# Patient Record
Sex: MALE | Race: WHITE | NOT HISPANIC OR LATINO | Employment: FULL TIME | URBAN - METROPOLITAN AREA
[De-identification: names, ages, dates, MRNs, and addresses within clinical notes are randomized per-mention and may not be internally consistent; named-entity substitution may affect disease eponyms.]

---

## 2017-10-24 ENCOUNTER — ALLSCRIPTS OFFICE VISIT (OUTPATIENT)
Dept: OTHER | Facility: OTHER | Age: 62
End: 2017-10-24

## 2017-10-24 ENCOUNTER — GENERIC CONVERSION - ENCOUNTER (OUTPATIENT)
Dept: OTHER | Facility: OTHER | Age: 62
End: 2017-10-24

## 2017-10-24 DIAGNOSIS — E22.1 HYPERPROLACTINEMIA (HCC): ICD-10-CM

## 2017-10-24 DIAGNOSIS — E29.1 TESTICULAR HYPOFUNCTION: ICD-10-CM

## 2017-10-26 ENCOUNTER — GENERIC CONVERSION - ENCOUNTER (OUTPATIENT)
Dept: OTHER | Facility: OTHER | Age: 62
End: 2017-10-26

## 2017-10-27 LAB
FSH (HISTORICAL): 2.8 MIU/ML (ref 1.5–12.4)
LUTEINIZING HORMONE (HISTORICAL): 1.2 MIU/ML (ref 1.7–8.6)
PROLACTIN (HISTORICAL): 23.7 NG/ML (ref 4–15.2)
SEX HORMONE BINDING GLOBULIN (HISTORICAL): 29 NMOL/L (ref 19.3–76.4)
T4 FREE SERPL-MCNC: 1.03 NG/DL (ref 0.82–1.77)
TESTOSTERONE FREE (HISTORICAL): 5.4 PG/ML (ref 6.6–18.1)
TESTOSTERONE TOTAL (HISTORICAL): 181 NG/DL (ref 264–916)
TSH SERPL DL<=0.05 MIU/L-ACNC: 2.28 UIU/ML (ref 0.45–4.5)

## 2017-10-31 ENCOUNTER — GENERIC CONVERSION - ENCOUNTER (OUTPATIENT)
Dept: OTHER | Facility: OTHER | Age: 62
End: 2017-10-31

## 2017-12-12 DIAGNOSIS — E22.1 HYPERPROLACTINEMIA (HCC): ICD-10-CM

## 2017-12-12 DIAGNOSIS — E22.0 ACROMEGALY AND PITUITARY GIGANTISM (HCC): ICD-10-CM

## 2018-01-16 NOTE — RESULT NOTES
Verified Results  (1) LH (LEUTINIZING HORMONE) 26Oct2017 06:42AM Yissel Herring     Test Name Result Flag Reference   LH 1 2 mIU/mL L 1 7-8 6     (1) [facility] 26Oct2017 06:42AM Yissle Herring     Test Name Result Flag Reference   [address] [place] [address] 2 8 mIU/mL  1 5-12 4     (1) PROLACTIN 26Oct2017 06:42AM Yissel Herring     Test Name Result Flag Reference   Prolactin 23 7 ng/mL H 4 0-15 2     (1) TSH 26Oct2017 06:42AM Yissel Herring     Test Name Result Flag Reference   TSH 2 280 uIU/mL  0 450-4 500     [facility]) Thyroxine (T4) Free, Direct, S 26Oct2017 06:42AM Yissel Herring     Test Name Result Flag Reference   T4,Free(Direct) 1 03 ng/dL  0 82-1 77     (1) TESTOSTERONE 26Oct2017 06:42AM Yissel Herring     Test Name Result Flag Reference   Testosterone, Serum 181 ng/dL L 277-856   Adult male reference interval is based on a population of  healthy nonobese males (BMI <30) between 23and 44years old  [facility], [address] 4114,[id number]  PMID: [id number]  [facility]) Sex Horm Binding Glob, Serum 26Oct2017 06:42AM Yissel Herring     Test Name Result Flag Reference   Sex Horm Binding Glob, Serum 29 0 nmol/L  19 3-76 4     ([facility]) Testosterone, Free, Direct 26Oct2017 06:42AM Yissel Herring     Test Name Result Flag Reference   Free Testosterone(Direct) 5 4 pg/mL L 6 6-18 1       Plan  Hyperprolactinemia, Hypogonadism male    · * MRI BRAIN PITUITARY WO AND W CONTRAST; Status:Need Information - Financial  Authorization; Requested for:31Oct2017;   Hypogonadism male    · (1) [place]; Status:Active; Requested for:31Oct2017;    · (1) CORTISOL AM SPECIMEN; Status:Active; Requested for:31Oct2017;    · (1) IGF-1; Status:Active; Requested for:31Oct2017;    · (1) PROLACTIN; Status:Active; Requested for:31Oct2017;    · ([facility]) Macroprolactin; Status:Active;  Requested for:31Oct2017;

## 2018-01-22 VITALS
BODY MASS INDEX: 24.62 KG/M2 | HEIGHT: 70 IN | DIASTOLIC BLOOD PRESSURE: 78 MMHG | HEART RATE: 64 BPM | WEIGHT: 172 LBS | SYSTOLIC BLOOD PRESSURE: 130 MMHG

## 2018-02-07 LAB
GLUCOSE 2H P 75 G GLC PO SERPL-MCNC: 91 MG/DL (ref 65–139)
GLUCOSE P FAST SERPL-MCNC: 87 MG/DL (ref 65–99)

## 2018-02-08 ENCOUNTER — OFFICE VISIT (OUTPATIENT)
Dept: ENDOCRINOLOGY | Facility: HOSPITAL | Age: 63
End: 2018-02-08
Payer: COMMERCIAL

## 2018-02-08 VITALS
SYSTOLIC BLOOD PRESSURE: 122 MMHG | DIASTOLIC BLOOD PRESSURE: 76 MMHG | WEIGHT: 171.4 LBS | HEIGHT: 70 IN | BODY MASS INDEX: 24.54 KG/M2 | HEART RATE: 60 BPM

## 2018-02-08 DIAGNOSIS — R79.89 ELEVATED INSULIN-LIKE GROWTH FACTOR 1 (IGF-1) LEVEL: ICD-10-CM

## 2018-02-08 DIAGNOSIS — E23.0 HYPOGONADOTROPIC HYPOGONADISM (HCC): Primary | ICD-10-CM

## 2018-02-08 DIAGNOSIS — D35.2 PITUITARY MACROADENOMA (HCC): ICD-10-CM

## 2018-02-08 PROCEDURE — 99214 OFFICE O/P EST MOD 30 MIN: CPT | Performed by: INTERNAL MEDICINE

## 2018-02-08 RX ORDER — B-COMPLEX WITH VITAMIN C
TABLET ORAL
COMMUNITY
End: 2018-05-10

## 2018-02-08 RX ORDER — DEXAMETHASONE 1 MG
TABLET ORAL
Qty: 1 TABLET | Refills: 0 | Status: SHIPPED | OUTPATIENT
Start: 2018-02-08 | End: 2018-02-22

## 2018-02-08 NOTE — PROGRESS NOTES
2/8/2018    Assessment/Plan      Diagnoses and all orders for this visit:    Hypogonadotropic hypogonadism [facility])  -     Ambulatory referral to Neurosurgery; Future  -     Cortisol- Lab Collect; Future  -     Dexamethasone; Future  -     dexamethasone (DECADRON) 1 mg tablet; Take 1 tablet 10 pm the night before cortisol blood test   -     Cortisol- Lab Collect  -     Growth hormone; Future  -     Growth hormone; Future  -     Growth hormone; Future  -     Growth hormone  -     Growth hormone  -     Growth hormone    Pituitary macroadenoma ([address] )  -     Ambulatory referral to Neurosurgery; Future  -     Cortisol- Lab Collect; Future  -     Dexamethasone; Future  -     dexamethasone (DECADRON) 1 mg tablet; Take 1 tablet 10 pm the night before cortisol blood test   -     Cortisol- Lab Collect  -     Growth hormone; Future  -     Growth hormone; Future  -     Growth hormone; Future  -     Growth hormone  -     Growth hormone  -     Growth hormone    Elevated insulin-like growth factor 1 (IGF-1) level  -     Ambulatory referral to Neurosurgery; Future  -     Cortisol- Lab Collect; Future  -     Dexamethasone; Future  -     dexamethasone (DECADRON) 1 mg tablet; Take 1 tablet 10 pm the night before cortisol blood test   -     Cortisol- Lab Collect  -     Growth hormone; Future  -     Growth hormone; Future  -     Growth hormone; Future  -     Growth hormone  -     Growth hormone  -     Growth hormone    Other orders  -     VITAMIN B COMPLEX-C CAPS; Take by mouth  -     cholecalciferol (VITAMIN D3) 1,000 units tablet; Take by mouth        Assessment/Plan: This is a 24-year-old male who initially presented with low testosterone  He is found to have mildly elevated prolactin as well as elevated IGF-1  Pituitary MRI disclosed pituitary tumor approaching the optic chiasm and extending into the cavernous sinus  I believe this is acromegaly causing his hypogonadism from a pituitary tumor source    [facility] was contacted today and apparently they did not do the growth hormone suppression test appropriately  To complete the picture, I have the growth hormone suppression test for the patient  He will also go for a 1 mg overnight dexamethasone suppression test to rule out hyper cortisolism  I have also referred him to neurosurgery to get plugged been  I asked him to bring a CT of MRI images for review at that time  For acromegaly, surgical treatment is generally the 1st approach  Medical treatment is more effective generally after surgical treatment if needed  I would need to recheck his growth hormone levels about 12 weeks after surgery  Would likely check his other pituitary hormones about a month after surgery  We would be available in patient to follow along as well  He did have a colonoscopy about 2 years ago and had 1 small polyp per the patient) I do not have these records at this time)  He does not provide me history of sleep apnea or cardiomyopathy though I do not believe these were tested in the past   Will preliminarily set up a 6 month follow-up appointment for now  CC:   Hypogonadism follow-up    History of Present Illness     HPI: Tam Painter is a 60y o  year old male presents for follow-up of hypogonadism  Elevated IGF-1 and prolactin levels were found since his last visit  He has no new complaints this visit  MRI of the pituitary did disclose a pituitary tumor  Discussing with the patient more, he states in November he had an extensive eye exam from his eye doctor that disclosed decreased left-sided peripheral vision as well as glaucoma which he is being treated for now  He denies any worsening of vision or headaches at this time  He still notes some decreased energy level  Review of Systems   Constitutional: Positive for fatigue  Negative for chills and fever  HENT: Negative for trouble swallowing and voice change  Eyes: Negative for visual disturbance     Respiratory: Negative for shortness of breath  Cardiovascular: Negative for chest pain, palpitations and leg swelling  Gastrointestinal: Negative for abdominal pain, nausea and vomiting  Endocrine: Negative for polydipsia and polyuria  Musculoskeletal: Negative for arthralgias and myalgias  Skin: Negative for rash  Neurological: Negative for dizziness, tremors and weakness  Hematological: Negative for adenopathy  Psychiatric/Behavioral: Negative for agitation and confusion  Historical Information   History reviewed  No pertinent past medical history  History reviewed  No pertinent surgical history  Social History   History   Alcohol use Not on file     History   Drug use: Unknown     History   Smoking Status    Former Smoker    Types: Cigarettes    Quit date: 1997   Smokeless Tobacco    Never Used     Family History:   Family History   Problem Relation Age of Onset    No Known Problems Mother     No Known Problems Father        Meds/Allergies   Current Outpatient Prescriptions   Medication Sig Dispense Refill    cholecalciferol (VITAMIN D3) 1,000 units tablet Take by mouth      VITAMIN B COMPLEX-C CAPS Take by mouth      dexamethasone (DECADRON) 1 mg tablet Take 1 tablet 10 pm the night before cortisol blood test  1 tablet 0     No current facility-administered medications for this visit  No Known Allergies    Objective   Vitals: Blood pressure 122/76, pulse 60, height 5' 9 5" (1 765 m), weight 77 7 kg (171 lb 6 4 oz)  Invasive Devices          No matching active lines, drains, or airways          Physical Exam   Constitutional: He is oriented to person, place, and time  He appears well-developed and well-nourished  No distress  HENT:   Head: Normocephalic and atraumatic  Mouth/Throat: No oropharyngeal exudate  Eyes: Conjunctivae and EOM are normal  Pupils are equal, round, and reactive to light  No scleral icterus  Neck: Normal range of motion  Neck supple  No thyromegaly present     Cardiovascular: Normal rate and regular rhythm  No murmur heard  Pulmonary/Chest: Effort normal and breath sounds normal  He has no wheezes  Abdominal: Soft  Bowel sounds are normal  He exhibits no distension  There is no tenderness  Musculoskeletal: Normal range of motion  He exhibits no edema  Neurological: He is alert and oriented to person, place, and time  He exhibits normal muscle tone  Skin: Skin is warm and dry  No rash noted  He is not diaphoretic  Psychiatric: He has a normal mood and affect  His behavior is normal        The history was obtained from the review of the chart and from the patient  Lab Results:      11/28/2017:  Glucose 104, creatinine 0 71, calcium 9 5, prolactin 26, IGF-1 658 (49584) a m  cortisol 15 3     12/21/2017:  Large enhancing sellar mass expanding the pituitary fossa extending superiorly to abut the optic chiasm  Bilateral cavernous sinus extension most prominent on the left side    This was MRI exam at [facility]     Lab Results   Component Value Date    FREET4 1 03 10/26/2017       Recent Results (from the past 83099 hour(s))   TESTOSTERONE, FREE, DIRECT (HISTORICAL)    Collection Time: 10/26/17  6:42 AM   Result Value Ref Range    TESTOSTERONE FREE 5 4 (L) 6 6 - 18 1 pg/mL   SEX HORMONE BINDING GLOBULIN,SERUM (HISTORICAL)    Collection Time: 10/26/17  6:42 AM   Result Value Ref Range    SEX HORMONE BINDING GLOBULIN 29 0 19 3 - 76 4 nmol/L   T4, FREE (HISTORICAL)    Collection Time: 10/26/17  6:42 AM   Result Value Ref Range    Free T4 1 03 0 82 - 1 77 ng/dL   TESTOSTERONE (HISTORICAL)    Collection Time: 10/26/17  6:42 AM   Result Value Ref Range    TESTOSTERONE TOTAL 181 (L) 264 - 916 ng/dL         Future Appointments  Date Time Provider Trell Garcia   8/8/2018 4:15 PM José Fuller, [address]

## 2018-02-08 NOTE — LETTER
February 8, 2018     Kirk Erickson  [address] [facility]    Patient: Carrie Mckinney   YOB: 1955   Date of Visit: 2/8/2018       Dear Dr Lopez Hurt: Thank you for referring Lian Kan to me for evaluation  Below are my notes for this consultation  If you have questions, please do not hesitate to call me  I look forward to following your patient along with you  Sincerely,        Manuela Meyer DO        CC: No Recipients  Manuela Meyer DO  2/8/2018  8:49 AM  Sign at close encounter  2/8/2018    Assessment/Plan      Diagnoses and all orders for this visit:    Hypogonadotropic hypogonadism [facility])  -     Ambulatory referral to Neurosurgery; Future  -     Cortisol- Lab Collect; Future  -     Dexamethasone; Future  -     dexamethasone (DECADRON) 1 mg tablet; Take 1 tablet 10 pm the night before cortisol blood test   -     Cortisol- Lab Collect  -     Growth hormone; Future  -     Growth hormone; Future  -     Growth hormone; Future  -     Growth hormone  -     Growth hormone  -     Growth hormone    Pituitary macroadenoma ([address] )  -     Ambulatory referral to Neurosurgery; Future  -     Cortisol- Lab Collect; Future  -     Dexamethasone; Future  -     dexamethasone (DECADRON) 1 mg tablet; Take 1 tablet 10 pm the night before cortisol blood test   -     Cortisol- Lab Collect  -     Growth hormone; Future  -     Growth hormone; Future  -     Growth hormone; Future  -     Growth hormone  -     Growth hormone  -     Growth hormone    Elevated insulin-like growth factor 1 (IGF-1) level  -     Ambulatory referral to Neurosurgery; Future  -     Cortisol- Lab Collect; Future  -     Dexamethasone; Future  -     dexamethasone (DECADRON) 1 mg tablet; Take 1 tablet 10 pm the night before cortisol blood test   -     Cortisol- Lab Collect  -     Growth hormone; Future  -     Growth hormone; Future  -     Growth hormone;  Future  -     Growth hormone  -     Growth hormone  - Growth hormone    Other orders  -     VITAMIN B COMPLEX-C CAPS; Take by mouth  -     cholecalciferol (VITAMIN D3) 1,000 units tablet; Take by mouth        Assessment/Plan: This is a 59-year-old male who initially presented with low testosterone  He is found to have mildly elevated prolactin as well as elevated IGF-1  Pituitary MRI disclosed pituitary tumor approaching the optic chiasm and extending into the cavernous sinus  I believe this is acromegaly causing his hypogonadism from a pituitary tumor source  [facility] was contacted today and apparently they did not do the growth hormone suppression test appropriately  To complete the picture, I have the growth hormone suppression test for the patient  He will also go for a 1 mg overnight dexamethasone suppression test to rule out hyper cortisolism  I have also referred him to neurosurgery to get plugged been  I asked him to bring a CT of MRI images for review at that time  For acromegaly, surgical treatment is generally the 1st approach  Medical treatment is more effective generally after surgical treatment if needed  I would need to recheck his growth hormone levels about 12 weeks after surgery  Would likely check his other pituitary hormones about a month after surgery  We would be available in patient to follow along as well  He did have a colonoscopy about 2 years ago and had 1 small polyp per the patient) I do not have these records at this time)  He does not provide me history of sleep apnea or cardiomyopathy though I do not believe these were tested in the past   Will preliminarily set up a 6 month follow-up appointment for now  CC:   Hypogonadism follow-up    History of Present Illness     HPI: Laure Rosales is a 60y o  year old male presents for follow-up of hypogonadism  Elevated IGF-1 and prolactin levels were found since his last visit  He has no new complaints this visit  MRI of the pituitary did disclose a pituitary tumor  Discussing with the patient more, he states in November he had an extensive eye exam from his eye doctor that disclosed decreased left-sided peripheral vision as well as glaucoma which he is being treated for now  He denies any worsening of vision or headaches at this time  He still notes some decreased energy level  Review of Systems   Constitutional: Positive for fatigue  Negative for chills and fever  HENT: Negative for trouble swallowing and voice change  Eyes: Negative for visual disturbance  Respiratory: Negative for shortness of breath  Cardiovascular: Negative for chest pain, palpitations and leg swelling  Gastrointestinal: Negative for abdominal pain, nausea and vomiting  Endocrine: Negative for polydipsia and polyuria  Musculoskeletal: Negative for arthralgias and myalgias  Skin: Negative for rash  Neurological: Negative for dizziness, tremors and weakness  Hematological: Negative for adenopathy  Psychiatric/Behavioral: Negative for agitation and confusion  Historical Information   History reviewed  No pertinent past medical history  History reviewed  No pertinent surgical history  Social History   History   Alcohol use Not on file     History   Drug use: Unknown     History   Smoking Status    Former Smoker    Types: Cigarettes    Quit date: 1997   Smokeless Tobacco    Never Used     Family History:   Family History   Problem Relation Age of Onset    No Known Problems Mother     No Known Problems Father        Meds/Allergies   Current Outpatient Prescriptions   Medication Sig Dispense Refill    cholecalciferol (VITAMIN D3) 1,000 units tablet Take by mouth      VITAMIN B COMPLEX-C CAPS Take by mouth      dexamethasone (DECADRON) 1 mg tablet Take 1 tablet 10 pm the night before cortisol blood test  1 tablet 0     No current facility-administered medications for this visit        No Known Allergies    Objective   Vitals: Blood pressure 122/76, pulse 60, height 5' 9 5" (1 765 m), weight 77 7 kg (171 lb 6 4 oz)  Invasive Devices          No matching active lines, drains, or airways          Physical Exam   Constitutional: He is oriented to person, place, and time  He appears well-developed and well-nourished  No distress  HENT:   Head: Normocephalic and atraumatic  Mouth/Throat: No oropharyngeal exudate  Eyes: Conjunctivae and EOM are normal  Pupils are equal, round, and reactive to light  No scleral icterus  Neck: Normal range of motion  Neck supple  No thyromegaly present  Cardiovascular: Normal rate and regular rhythm  No murmur heard  Pulmonary/Chest: Effort normal and breath sounds normal  He has no wheezes  Abdominal: Soft  Bowel sounds are normal  He exhibits no distension  There is no tenderness  Musculoskeletal: Normal range of motion  He exhibits no edema  Neurological: He is alert and oriented to person, place, and time  He exhibits normal muscle tone  Skin: Skin is warm and dry  No rash noted  He is not diaphoretic  Psychiatric: He has a normal mood and affect  His behavior is normal        The history was obtained from the review of the chart and from the patient  Lab Results:      11/28/2017:  Glucose 104, creatinine 0 71, calcium 9 5, prolactin 26, IGF-1 658 (02596) a m  cortisol 15 3     12/21/2017:  Large enhancing sellar mass expanding the pituitary fossa extending superiorly to abut the optic chiasm  Bilateral cavernous sinus extension most prominent on the left side    This was MRI exam at [facility]     Lab Results   Component Value Date    FREET4 1 03 10/26/2017       Recent Results (from the past 72151 hour(s))   TESTOSTERONE, FREE, DIRECT (HISTORICAL)    Collection Time: 10/26/17  6:42 AM   Result Value Ref Range    TESTOSTERONE FREE 5 4 (L) 6 6 - 18 1 pg/mL   SEX HORMONE BINDING GLOBULIN,SERUM (HISTORICAL)    Collection Time: 10/26/17  6:42 AM   Result Value Ref Range    SEX HORMONE BINDING GLOBULIN 29 0 19 3 - 76 4 nmol/L   T4, FREE (HISTORICAL)    Collection Time: 10/26/17  6:42 AM   Result Value Ref Range    Free T4 1 03 0 82 - 1 77 ng/dL   TESTOSTERONE (HISTORICAL)    Collection Time: 10/26/17  6:42 AM   Result Value Ref Range    TESTOSTERONE TOTAL 181 (L) 264 - 916 ng/dL         Future Appointments  Date Time Provider Trell Garcia   8/8/2018 4:15 PM [facility]

## 2018-02-08 NOTE — LETTER
February 8, 2018     [place]  [address] [facility]    Patient: Avel Angelucci   YOB: 1955   Date of Visit: 2/8/2018       Dear Dr Danny Mcneal: Thank you for referring Salima Yoni to me for evaluation  Below are my notes for this consultation  If you have questions, please do not hesitate to call me  I look forward to following your patient along with you  Sincerely,        Iva Guzman DO        CC: No Recipients  Iva Guzman DO  2/8/2018  7:44 AM  Sign at close encounter  2/8/2018    Assessment/Plan      Diagnoses and all orders for this visit:    Hypogonadotropic hypogonadism ([address] )    Pituitary macroadenoma ([address] )    Elevated insulin-like growth factor 1 (IGF-1) level    Other orders  -     VITAMIN B COMPLEX-C CAPS; Take by mouth  -     cholecalciferol (VITAMIN D3) 1,000 units tablet; Take by mouth        Assessment/Plan:  ***      CC: ***    History of Present Illness     HPI: Avel Angelucci is a 60y o  year old male with history of ***    Review of Systems   Constitutional: Negative for chills, fatigue and fever  HENT: Negative for trouble swallowing and voice change  Eyes: Negative for visual disturbance  Respiratory: Negative for shortness of breath  Cardiovascular: Negative for chest pain, palpitations and leg swelling  Gastrointestinal: Negative for abdominal pain, nausea and vomiting  Endocrine: Negative for polydipsia and polyuria  Musculoskeletal: Negative for arthralgias and myalgias  Skin: Negative for rash  Neurological: Negative for dizziness, tremors and weakness  Hematological: Negative for adenopathy  Psychiatric/Behavioral: Negative for agitation and confusion  Historical Information   History reviewed  No pertinent past medical history  History reviewed  No pertinent surgical history    Social History   History   Alcohol use Not on file     History   Drug use: Unknown     History   Smoking Status    Former Smoker    Types: Cigarettes    Quit date: 1997   Smokeless Tobacco    Never Used     Family History:   Family History   Problem Relation Age of Onset    No Known Problems Mother     No Known Problems Father        Meds/Allergies   Current Outpatient Prescriptions   Medication Sig Dispense Refill    cholecalciferol (VITAMIN D3) 1,000 units tablet Take by mouth      VITAMIN B COMPLEX-C CAPS Take by mouth       No current facility-administered medications for this visit  No Known Allergies    Objective   Vitals: Blood pressure 122/76, pulse 60, height 5' 9 5" (1 765 m), weight 77 7 kg (171 lb 6 4 oz)  Invasive Devices          No matching active lines, drains, or airways          Physical Exam   Constitutional: He is oriented to person, place, and time  He appears well-developed and well-nourished  No distress  HENT:   Head: Normocephalic and atraumatic  Mouth/Throat: No oropharyngeal exudate  Eyes: Conjunctivae and EOM are normal  Pupils are equal, round, and reactive to light  No scleral icterus  Neck: Normal range of motion  Neck supple  No thyromegaly present  Cardiovascular: Normal rate and regular rhythm  No murmur heard  Pulmonary/Chest: Effort normal and breath sounds normal  He has no wheezes  Abdominal: Soft  Bowel sounds are normal  He exhibits no distension  There is no tenderness  Musculoskeletal: Normal range of motion  He exhibits no edema  Neurological: He is alert and oriented to person, place, and time  He exhibits normal muscle tone  Skin: Skin is warm and dry  No rash noted  He is not diaphoretic  Psychiatric: He has a normal mood and affect  His behavior is normal        The history was obtained from the review of the chart and from the patient      Lab Results:      ***    Lab Results   Component Value Date    FREET4 1 03 10/26/2017       Recent Results (from the past 02996 hour(s))   TESTOSTERONE, FREE, DIRECT (HISTORICAL)    Collection Time: 10/26/17  6:42 AM   Result Value Ref Range    TESTOSTERONE FREE 5 4 (L) 6 6 - 18 1 pg/mL   SEX HORMONE BINDING GLOBULIN,SERUM (HISTORICAL)    Collection Time: 10/26/17  6:42 AM   Result Value Ref Range    SEX HORMONE BINDING GLOBULIN 29 0 19 3 - 76 4 nmol/L   T4, FREE (HISTORICAL)    Collection Time: 10/26/17  6:42 AM   Result Value Ref Range    Free T4 1 03 0 82 - 1 77 ng/dL   TESTOSTERONE (HISTORICAL)    Collection Time: 10/26/17  6:42 AM   Result Value Ref Range    TESTOSTERONE TOTAL 181 (L) 264 - 916 ng/dL         No future appointments

## 2018-02-22 ENCOUNTER — TELEPHONE (OUTPATIENT)
Dept: NEUROSURGERY | Facility: CLINIC | Age: 63
End: 2018-02-22

## 2018-02-22 ENCOUNTER — OFFICE VISIT (OUTPATIENT)
Dept: NEUROSURGERY | Facility: CLINIC | Age: 63
End: 2018-02-22
Payer: COMMERCIAL

## 2018-02-22 VITALS
SYSTOLIC BLOOD PRESSURE: 112 MMHG | HEIGHT: 70 IN | DIASTOLIC BLOOD PRESSURE: 60 MMHG | HEART RATE: 51 BPM | TEMPERATURE: 97.2 F | BODY MASS INDEX: 24.34 KG/M2 | RESPIRATION RATE: 16 BRPM | WEIGHT: 170 LBS

## 2018-02-22 DIAGNOSIS — D35.2 PITUITARY MACROADENOMA (HCC): ICD-10-CM

## 2018-02-22 DIAGNOSIS — E23.0 HYPOGONADOTROPIC HYPOGONADISM (HCC): ICD-10-CM

## 2018-02-22 DIAGNOSIS — R79.89 ELEVATED INSULIN-LIKE GROWTH FACTOR 1 (IGF-1) LEVEL: ICD-10-CM

## 2018-02-22 PROCEDURE — 99244 OFF/OP CNSLTJ NEW/EST MOD 40: CPT | Performed by: NEUROLOGICAL SURGERY

## 2018-02-22 NOTE — PROGRESS NOTES
Patient ID: Matthew Hernandez is a 60 y o  male    Assessment/Plan:    Diagnoses and all orders for this visit:    Hypogonadotropic hypogonadism ([facility] [address] )  -     Ambulatory referral to Neurosurgery    Pituitary macroadenoma [facility])  -     Ambulatory referral to Neurosurgery    Elevated insulin-like growth factor 1 (IGF-1) level  -     Ambulatory referral to Neurosurgery          Discussion Summary: This is a 21-year-old male who is a  and is noted inability to see things in his peripheral vision  Imaging studies disclosed a large pituitary macroadenoma  This is an IGF 1 secreting tumor  His dexamethasone suppression test a remains to be performed  Imaging studies demonstrate compression of the overlying optic chiasm  I recommended that he undergo the following surgical intervention:  Image guided endoscopic transsphenoidal approach for debulking of pituitary macroadenoma an abdominal fat graft harvesting with possible placement of lumbar drain    The risks, benefits and complications of surgery were explained in detail to Matthew Hernandez  including hemorrhage, infection, CSF leakage, wound problems, pain, weakness, numbness, dysesthesiae, paralysis, coma, and death  Also, the possibility  of further surgery being required was emphasized  Other potential medical complications were outlined, including deep venous thrombosis, pulmonary embolism, pneumonia, urinary tract infection, myocardial infarction,  and stroke  The need for physical therapy, occupational therapy, and rehabilitation was also mentioned  The alternatives to surgery were discussed  Isidropatience Hernandez asked relevant questions and asked that we proceed with arrangements for surgery       Reason for Visit: Consult and Brain Tumor      Chief Complaint: Patient presents for initial evaluation regarding pituitary macroadenoma found on MRI brain    HPI: Patient is a 60year old male who was referred to our office by Dr Misbah Reilly for evaluation of pituitary macroadenoma  Patient states that everything started with him feeling tired and decreased stamina  He went to his primary care physician and he referred him to endocrinology  Endocrinology ordered labs to check on testosterone level which was low and MRI Brain which found the brain mass  Patient referred for neurosurgical evaluation  His prolactin level increases likely related to compression and stalk effect  His testosterone level is decreased causing some muscle wasting  This patient 1st noted a problem when his  could see other aircraft which he could not see  He sought the attention of a family doctor and then an ophthalmologist to identified visual field deficits  An MRI was performed and he was referred to an endocrinologist   This endocrinologist identified ache IGF 1 increase and testosterone level decreased  His prolactin level was mildly increased  He said that he felt increasingly tired and has noted some wasting of his arms  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   He has noted very little change in his ability to perform this other then becoming more tired more easily and feeling of being weaker  Review of Systems   Constitutional:        Feeling tired but since being on the Vitamin B-12 he feels that this has improved   HENT: Negative  Eyes: Negative  Respiratory: Negative  Cardiovascular: Negative  Gastrointestinal: Negative  Endocrine: Negative  Genitourinary: Negative  Musculoskeletal: Negative  Skin: Negative  Allergic/Immunologic: Negative  Neurological: Negative  Hematological: Negative  Psychiatric/Behavioral: Negative              The following portions of the patient's history were reviewed and updated as appropriate: allergies, current medications, past family history, past medical history, past social history, past surgical history and problem list     Active Ambulatory Problems     Diagnosis Date Noted    No Active Ambulatory Problems     Resolved Ambulatory Problems     Diagnosis Date Noted    No Resolved Ambulatory Problems     Past Medical History:   Diagnosis Date    Decreased stamina     Feeling tired     Lipoma     Lipoma of left shoulder        Past Surgical History:   Procedure Laterality Date    ANKLE SURGERY      lipoma    FINGER TENDON REPAIR      SHOULDER SURGERY      lipoma removal     History   Smoking Status    Former Smoker    Types: Cigarettes    Quit date: 1997   Smokeless Tobacco    Never Used     History   Alcohol Use    Yes     Comment: occasional     History   Drug Use No       Vitals:    02/22/18 1307   BP: 112/60   Pulse: (!) 51   Resp: 16   Temp: (!) 97 2 °F (36 2 °C)         Current Outpatient Prescriptions:     cholecalciferol (VITAMIN D3) 1,000 units tablet, Take by mouth daily  , Disp: , Rfl:     VITAMIN B COMPLEX-C CAPS, Take by mouth, Disp: , Rfl:   He also states that he has eye drops but does not remember the name  Physical Exam  Awake alert oriented  Extraocular movements are intact  Pupils are equal round reactive to light and accommodate  This facial sensation is intact bilaterally  Facial expressions intact and grimace and at rest  His jaw is quite large  His hands are large  He says that he has experienced some muscle atrophy and is biceps muscles are small in comparison to his forearms  He has no difficulty with station and gait  His sensation in the upper and lower extremities is intact to double simultaneous stimulation  Finger-to-nose testing intact  Rapid alternating movements are intact    Results/Data:  An MRI of the brain demonstrates a pituitary macroadenoma with compression of the overlying optic chiasm  This is homogeneous leak enhancing  There is expansion of the normal size of the sella indicative of a tumor that has been there for quite some time    CT images below

## 2018-02-22 NOTE — LETTER
February 22, 2018      Garrick Cardona, [address]  [address] [address]    Patient: Venessa Larkin   YOB: 1955   Date of Visit: 2/22/2018       Dear Dr Vika Damico: Thank you for referring Jihan Chela to me for evaluation  Below are my notes for this consultation  If you have questions, please do not hesitate to call me  I look forward to following your patient along with you           Sincerely,        Lamont Blair MD        CC: Melina Palma

## 2018-02-22 NOTE — PATIENT INSTRUCTIONS
Please make sure you are laboratory testing firm forwards your endocrine lab results to our office    Please ask your ophthalmologist to Re for his information and future evaluations to our office    Consider strongly scheduling a surgery for removing portion of the pituitary tumor

## 2018-02-22 NOTE — PROGRESS NOTES
Patient ID: Eduard Vasquez is a 60 y o  male    Assessment/Plan:    Diagnoses and all orders for this visit:    Hypogonadotropic hypogonadism [facility])  -     Ambulatory referral to Neurosurgery  -     Ambulatory referral to Ophthalmology; Future  -     Case request operating room: Image guided endoscopic transsphenoidal approach for debulking of pituitary macroadenoma an abdominal fat graft harvesting with possible placement of lumbar drain, Image guided endoscopic transsphenoidal approach for d   ; Standing  -     Case request operating room: Image guided endoscopic transsphenoidal approach for debulking of pituitary macroadenoma an abdominal fat graft harvesting with possible placement of lumbar drain, Image guided endoscopic transsphenoidal approach for d    Pituitary macroadenoma ([facility] [address] )  -     Ambulatory referral to Neurosurgery  -     Ambulatory referral to Ophthalmology; Future  -     Case request operating room: Image guided endoscopic transsphenoidal approach for debulking of pituitary macroadenoma an abdominal fat graft harvesting with possible placement of lumbar drain, Image guided endoscopic transsphenoidal approach for d   ; Standing  -     Case request operating room: Image guided endoscopic transsphenoidal approach for debulking of pituitary macroadenoma an abdominal fat graft harvesting with possible placement of lumbar drain, Image guided endoscopic transsphenoidal approach for d    Elevated insulin-like growth factor 1 (IGF-1) level  -     Ambulatory referral to Neurosurgery  -     Ambulatory referral to Ophthalmology; Future  -     Case request operating room: Image guided endoscopic transsphenoidal approach for debulking of pituitary macroadenoma an abdominal fat graft harvesting with possible placement of lumbar drain, Image guided endoscopic transsphenoidal approach for d   ; Standing  -     Case request operating room: Image guided endoscopic transsphenoidal approach for debulking of pituitary macroadenoma an abdominal fat graft harvesting with possible placement of lumbar drain, Image guided endoscopic transsphenoidal approach for d  Other orders  -     Diet NPO; Sips with meds; Standing  -     Height and weight upon arrival; Standing  -     Void on call to OR; Standing  -     Insert peripheral IV; Standing          Discussion Summary: This is a 76-year-old male who is a  and is noted inability to see things in his peripheral vision  Imaging studies disclosed a large pituitary macroadenoma  This is an IGF 1 secreting tumor  His dexamethasone suppression test a remains to be performed  Imaging studies demonstrate compression of the overlying optic chiasm  I recommended that he undergo the following surgical intervention:  Image guided endoscopic transsphenoidal approach for debulking of pituitary macroadenoma an abdominal fat graft harvesting with possible placement of lumbar drain    The risks, benefits and complications of surgery were explained in detail to [employer]  including hemorrhage, infection, CSF leakage, wound problems, pain, weakness, numbness, dysesthesiae, paralysis, coma, and death  Also, the possibility  of further surgery being required was emphasized  Other potential medical complications were outlined, including deep venous thrombosis, pulmonary embolism, pneumonia, urinary tract infection, myocardial infarction,  and stroke  The need for physical therapy, occupational therapy, and rehabilitation was also mentioned  The alternatives to surgery were discussed  [employer] asked relevant questions and asked that we proceed with arrangements for surgery  Reason for Visit: Consult and Brain Tumor      Chief Complaint: Patient presents for initial evaluation regarding pituitary macroadenoma found on MRI brain    HPI: Patient is a 60year old male who was referred to our office by Dr North Magaña for evaluation of pituitary macroadenoma   Patient states that everything started with him feeling tired and decreased stamina  He went to his primary care physician and he referred him to endocrinology  Endocrinology ordered labs to check on testosterone level which was low and MRI Brain which found the brain mass  Patient referred for neurosurgical evaluation  His prolactin level increases likely related to compression and stalk effect  His testosterone level is decreased causing some muscle wasting  This patient 1st noted a problem when his  could see other aircraft which he could not see  He sought the attention of a family doctor and then an ophthalmologist to identified visual field deficits  An MRI was performed and he was referred to an endocrinologist   This endocrinologist identified ache IGF 1 increase and testosterone level decreased  His prolactin level was mildly increased  He said that he felt increasingly tired and has noted some wasting of his arms  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   He has noted very little change in his ability to perform this other then becoming more tired more easily and feeling of being weaker  Review of Systems   Constitutional:        Feeling tired but since being on the Vitamin B-12 he feels that this has improved   HENT: Negative  Eyes: Negative  Respiratory: Negative  Cardiovascular: Negative  Gastrointestinal: Negative  Endocrine: Negative  Genitourinary: Negative  Musculoskeletal: Negative  Skin: Negative  Allergic/Immunologic: Negative  Neurological: Negative  Hematological: Negative  Psychiatric/Behavioral: Negative              The following portions of the patient's history were reviewed and updated as appropriate: allergies, current medications, past family history, past medical history, past social history, past surgical history and problem list     Active Ambulatory Problems     Diagnosis Date Noted    No Active Ambulatory Problems Resolved Ambulatory Problems     Diagnosis Date Noted    No Resolved Ambulatory Problems     Past Medical History:   Diagnosis Date    Decreased stamina     Feeling tired     Lipoma     Lipoma of left shoulder        Past Surgical History:   Procedure Laterality Date    ANKLE SURGERY      lipoma    FINGER TENDON REPAIR      SHOULDER SURGERY      lipoma removal     History   Smoking Status    Former Smoker    Types: Cigarettes    Quit date: 1997   Smokeless Tobacco    Never Used     History   Alcohol Use    Yes     Comment: occasional     History   Drug Use No       Vitals:    02/22/18 1307   BP: 112/60   Pulse: (!) 51   Resp: 16   Temp: (!) 97 2 °F (36 2 °C)         Current Outpatient Prescriptions:     cholecalciferol (VITAMIN D3) 1,000 units tablet, Take by mouth daily  , Disp: , Rfl:     VITAMIN B COMPLEX-C CAPS, Take by mouth, Disp: , Rfl:   He also states that he has eye drops but does not remember the name  Physical Exam  Awake alert oriented  Extraocular movements are intact  Pupils are equal round reactive to light and accommodate  This facial sensation is intact bilaterally  Facial expressions intact and grimace and at rest  His jaw is quite large  His hands are large  He says that he has experienced some muscle atrophy and is biceps muscles are small in comparison to his forearms  He has no difficulty with station and gait  His sensation in the upper and lower extremities is intact to double simultaneous stimulation  Finger-to-nose testing intact  Rapid alternating movements are intact    Results/Data:  An MRI of the brain demonstrates a pituitary macroadenoma with compression of the overlying optic chiasm  This is homogeneous leak enhancing  There is expansion of the normal size of the sella indicative of a tumor that has been there for quite some time    CT images below

## 2018-02-22 NOTE — TELEPHONE ENCOUNTER
Dr Beba Liang saw the patient today and recommended that he undergo the following surgical intervention:  Image guided endoscopic transsphenoidal approach for debulking of pituitary macroadenoma an abdominal fat graft harvesting with possible placement of lumbar drain  We are pending an Ophthalmology visit he has scheduled for tomorrow and Endocrine test including "Dexamethasone Suppression Test" to get him scheduled for the above procedure       PENDING

## 2018-02-28 LAB
CORTIS SERPL-MCNC: 8.7 UG/DL
DEXAMETHASONE SERPL-MCNC: 108 NG/DL

## 2018-03-01 DIAGNOSIS — D49.7 PITUITARY TUMOR: Primary | ICD-10-CM

## 2018-03-05 NOTE — TELEPHONE ENCOUNTER
Patient was seen by [facility] on 2/26/18  They will be faxing testing completed at the office visit and I requested the OCT to be mailed which they are going to check if it is possible  I will be getting a call back regarding that but records will be faxed to our back fax at [phone]

## 2018-03-06 NOTE — TELEPHONE ENCOUNTER
Patient had the requested testing completed  Dexamethasone test and Ophthalmology Eval please review and advise regarding scheduling for surgery

## 2018-03-09 ENCOUNTER — DOCUMENTATION (OUTPATIENT)
Dept: NEUROSURGERY | Facility: CLINIC | Age: 63
End: 2018-03-09

## 2018-03-13 NOTE — TELEPHONE ENCOUNTER
Results came back and were reviewed by Dr Emiliano Krueger  He said we are good to go with planned surgical intervention per last note  Margo Jaime is aware and will contact ENT to arrange   Thanks

## 2018-03-14 PROBLEM — D35.2 PITUITARY MACROADENOMA (HCC): Status: ACTIVE | Noted: 2018-03-14

## 2018-03-14 PROBLEM — E23.0 HYPOGONADOTROPIC HYPOGONADISM (HCC): Status: ACTIVE | Noted: 2018-03-14

## 2018-03-14 PROBLEM — R79.89 ELEVATED INSULIN-LIKE GROWTH FACTOR 1 (IGF-1) LEVEL: Status: ACTIVE | Noted: 2018-03-14

## 2018-03-17 LAB
CORTIS F 24H UR-MRATE: 74 UG/24 HR (ref 0–50)
CORTIS F UR-MCNC: 48 UG/L
CREAT 24H UR-MRATE: 1415 MG/24 HR (ref 1000–2000)
CREAT UR-MCNC: 91.3 MG/DL

## 2018-03-23 ENCOUNTER — DOCUMENTATION (OUTPATIENT)
Dept: NEUROSURGERY | Facility: CLINIC | Age: 63
End: 2018-03-23

## 2018-03-23 DIAGNOSIS — D35.2 PITUITARY MACROADENOMA (HCC): Primary | ICD-10-CM

## 2018-04-11 RX ORDER — LATANOPROST 50 UG/ML
1 SOLUTION/ DROPS OPHTHALMIC
COMMUNITY

## 2018-04-11 NOTE — PRE-PROCEDURE INSTRUCTIONS
Pre-Surgery Instructions:   Medication Instructions    latanoprost (XALATAN) 0 005 % ophthalmic solution Instructed patient per Anesthesia Guidelines  Pre op instructions reviewed with patient on 4/11     [facility] bathing and hospital instructions reviewed at this time with understanding

## 2018-04-18 ENCOUNTER — OFFICE VISIT (OUTPATIENT)
Dept: NEUROSURGERY | Facility: CLINIC | Age: 63
End: 2018-04-18

## 2018-04-18 VITALS
WEIGHT: 167.6 LBS | HEIGHT: 70 IN | SYSTOLIC BLOOD PRESSURE: 127 MMHG | HEART RATE: 62 BPM | RESPIRATION RATE: 16 BRPM | TEMPERATURE: 97.4 F | DIASTOLIC BLOOD PRESSURE: 65 MMHG | BODY MASS INDEX: 23.99 KG/M2

## 2018-04-18 DIAGNOSIS — D35.2 PITUITARY MACROADENOMA (HCC): Primary | ICD-10-CM

## 2018-04-18 DIAGNOSIS — Z01.818 PREOP EXAMINATION: Primary | ICD-10-CM

## 2018-04-18 DIAGNOSIS — D35.2 PITUITARY MACROADENOMA (HCC): ICD-10-CM

## 2018-04-18 PROCEDURE — PREOP: Performed by: PHYSICIAN ASSISTANT

## 2018-04-18 NOTE — PATIENT INSTRUCTIONS
Patient advised to make appointment with his PCP for clearance as previously advised  Patient to have updated MRI of the brain pituitary wo and w contrast (with Bravo) for upcoming surgery on 4/25/2018

## 2018-04-18 NOTE — PROGRESS NOTES
Assessment/Plan:    Very pleasant 60-year-old male, presents for preoperative evaluation, he has planned "image guided endoscopic transsphenoidal approach for debulk in a pituitary macroadenoma abdominal fat graft harvesting with possible placement of lumbar drain", scheduled for 4/25/18  The procedure is scheduled with Dr Tyrone Guerrero, and the patient reports Dr Tyrone Guerrero has explained in a very detailed fashion the proposed surgical procedure, risks and possible complications, as well as the benefits of the procedure  He expresses understanding of this information/explanation, and is in agreement with the proposed procedure and wishes to proceed  He is a life time nonsmoker  He has a prior history of surgery performed under general anesthesia which was tolerated without complication    He denies SOB or CP with activity  He denies bleeding disorder or history of same  He denies chronic pulmonary conditions  He is under the care of a primary care provider and is followed regularly for wellness  He denies medication allergies  He understands to discontinue aspirin, or NSAIDS, 7 days prior to his surgery  Postoperative activities were briefly reviewed, he understands he is to lift no greater than 10 pounds until follow-up in approximately 6 weeks, he also understands to avoid immersion in water for approximately 4 weeks, and additionally understands he may ambulate as tolerated and is encouraged to do so  Pre operative skin preparation was discussed with the patient and he understand to wash/ shower with Hibiclens (chlorhexidine) and utilize Jakub cloths as directed  These findings, impressions and recommendations are reviewed in great detail with the patient, he expressed understanding and agreement, his questions were answered completely and to his satisfaction           Diagnoses and all orders for this visit:    Preop examination  -     MRI brain pituitary wo and w contrast; Future    Pituitary macroadenoma ([facility] [address] )  -     MRI brain pituitary wo and w contrast; Future          Subjective:      Patient ID: Christie Mcclure is a 60 y o  male  Very pleasant 13-year-old male, presents for preoperative evaluation  He has planned follow-up with his primary care provider for clearance 4/19/18  He has planned laboratory testing 4/19/18  He has planned surgery as noted, image guided endoscopic transsphenoidal approach for debulking of pituitary macroadenoma and abdominal fat graft harvesting with possible placement of lumbar drain  He reports he had a episode of far in body in his eyes, met with his ophthalmologist for management of this condition, underwent complete ophthalmological examination was diagnosed with glaucoma as well as visual field defect  He was further referred to endocrinology for additional evaluation, imaging revealed a pituitary macroadenoma  The following portions of the patient's history were reviewed and updated as appropriate: allergies, current medications, past family history, past medical history, past social history and past surgical history  Review of Systems   Constitutional: Negative  HENT: Negative  Eyes: Positive for visual disturbance  Negative for photophobia, pain, discharge, redness and itching  Respiratory: Negative  Cardiovascular: Negative  Gastrointestinal: Negative  Endocrine: Negative  Genitourinary: Negative  Musculoskeletal: Positive for myalgias  Negative for arthralgias, back pain, gait problem, joint swelling, neck pain and neck stiffness  Skin: Negative  Allergic/Immunologic: Negative  Neurological: Negative  Hematological: Negative  Psychiatric/Behavioral: Negative  Objective:    Physical Exam   Constitutional: He is oriented to person, place, and time  He appears well-developed and well-nourished  HENT:   Head: Normocephalic and atraumatic  Neck: Normal range of motion  Cardiovascular: Normal rate, regular rhythm and normal heart sounds  Pulmonary/Chest: Effort normal and breath sounds normal    Abdominal: Soft  Bowel sounds are normal    Musculoskeletal: Normal range of motion  Neurological: He is alert and oriented to person, place, and time  Gait normal    Skin: Skin is warm and dry  Psychiatric: He has a normal mood and affect  Neurologic Exam     Mental Status   Oriented to person, place, and time     Level of consciousness: alert    Motor Exam   Muscle bulk: normal  Overall muscle tone: normal    Gait, Coordination, and Reflexes     Gait  Gait: normal

## 2018-04-19 NOTE — PROGRESS NOTES
There was a duplicate order for an MRI of the brain but this was discontinued and correct order was left in chart

## 2018-04-20 LAB
ABO GROUP BLD: NORMAL
ALBUMIN SERPL-MCNC: 4.1 G/DL (ref 3.6–4.8)
ALBUMIN/GLOB SERPL: 1.6 {RATIO} (ref 1.2–2.2)
ALP SERPL-CCNC: 63 IU/L (ref 39–117)
ALT SERPL-CCNC: 23 IU/L (ref 0–44)
APPEARANCE UR: CLEAR
APTT PPP: 29 SEC (ref 24–33)
AST SERPL-CCNC: 25 IU/L (ref 0–40)
BACTERIA URNS QL MICRO: ABNORMAL
BASOPHILS # BLD AUTO: 0 X10E3/UL (ref 0–0.2)
BASOPHILS NFR BLD AUTO: 0 %
BILIRUB SERPL-MCNC: 0.6 MG/DL (ref 0–1.2)
BILIRUB UR QL STRIP: NEGATIVE
BLD GP AB SCN SERPL QL: NEGATIVE
BUN SERPL-MCNC: 20 MG/DL (ref 8–27)
BUN/CREAT SERPL: 27 (ref 10–24)
CALCIUM SERPL-MCNC: 9.5 MG/DL (ref 8.6–10.2)
CHLORIDE SERPL-SCNC: 102 MMOL/L (ref 96–106)
CO2 SERPL-SCNC: 26 MMOL/L (ref 18–29)
COLOR UR: YELLOW
CREAT SERPL-MCNC: 0.73 MG/DL (ref 0.76–1.27)
EOSINOPHIL # BLD AUTO: 0.2 X10E3/UL (ref 0–0.4)
EOSINOPHIL NFR BLD AUTO: 4 %
EPI CELLS #/AREA URNS HPF: ABNORMAL /HPF
ERYTHROCYTE [DISTWIDTH] IN BLOOD BY AUTOMATED COUNT: 13.9 % (ref 12.3–15.4)
EST. AVERAGE GLUCOSE BLD GHB EST-MCNC: 114 MG/DL
GLOBULIN SER-MCNC: 2.5 G/DL (ref 1.5–4.5)
GLUCOSE SERPL-MCNC: 96 MG/DL (ref 65–99)
GLUCOSE UR QL: NEGATIVE
HBA1C MFR BLD: 5.6 % (ref 4.8–5.6)
HCT VFR BLD AUTO: 43.7 % (ref 37.5–51)
HGB BLD-MCNC: 14.2 G/DL (ref 13–17.7)
HGB UR QL STRIP: ABNORMAL
IMM GRANULOCYTES # BLD: 0 X10E3/UL (ref 0–0.1)
IMM GRANULOCYTES NFR BLD: 0 %
INR PPP: 1 (ref 0.8–1.2)
KETONES UR QL STRIP: NEGATIVE
LEUKOCYTE ESTERASE UR QL STRIP: NEGATIVE
LYMPHOCYTES # BLD AUTO: 1.7 X10E3/UL (ref 0.7–3.1)
LYMPHOCYTES NFR BLD AUTO: 35 %
MCH RBC QN AUTO: 31.6 PG (ref 26.6–33)
MCHC RBC AUTO-ENTMCNC: 32.5 G/DL (ref 31.5–35.7)
MCV RBC AUTO: 97 FL (ref 79–97)
MICRO URNS: ABNORMAL
MONOCYTES # BLD AUTO: 0.4 X10E3/UL (ref 0.1–0.9)
MONOCYTES NFR BLD AUTO: 8 %
MUCOUS THREADS URNS QL MICRO: PRESENT
NEUTROPHILS # BLD AUTO: 2.6 X10E3/UL (ref 1.4–7)
NEUTROPHILS NFR BLD AUTO: 53 %
NITRITE UR QL STRIP: NEGATIVE
PH UR STRIP: 5.5 [PH] (ref 5–7.5)
PLATELET # BLD AUTO: 268 X10E3/UL (ref 150–379)
POTASSIUM SERPL-SCNC: 4.5 MMOL/L (ref 3.5–5.2)
PROT SERPL-MCNC: 6.6 G/DL (ref 6–8.5)
PROT UR QL STRIP: NEGATIVE
PROTHROMBIN TIME: 10.4 SEC (ref 9.1–12)
RBC # BLD AUTO: 4.49 X10E6/UL (ref 4.14–5.8)
RBC #/AREA URNS HPF: ABNORMAL /HPF
RH BLD: POSITIVE
SL AMB EGFR AFRICAN AMERICAN: 114 ML/MIN/1.73
SL AMB EGFR NON AFRICAN AMERICAN: 99 ML/MIN/1.73
SODIUM SERPL-SCNC: 142 MMOL/L (ref 134–144)
SP GR UR: 1.02 (ref 1–1.03)
UROBILINOGEN UR STRIP-ACNC: 0.2 EU/DL (ref 0.2–1)
WBC # BLD AUTO: 4.9 X10E3/UL (ref 3.4–10.8)
WBC #/AREA URNS HPF: ABNORMAL /HPF

## 2018-04-24 ENCOUNTER — DOCUMENTATION (OUTPATIENT)
Dept: NEUROSURGERY | Facility: CLINIC | Age: 63
End: 2018-04-24

## 2018-04-24 NOTE — PROGRESS NOTES
[place] and [place]  websites were searched for patient that is scheduled for surgery tomorrow, 4/25/18  No current records found

## 2018-04-25 ENCOUNTER — APPOINTMENT (OUTPATIENT)
Dept: RADIOLOGY | Facility: HOSPITAL | Age: 63
DRG: 623 | End: 2018-04-25
Payer: COMMERCIAL

## 2018-04-25 ENCOUNTER — HOSPITAL ENCOUNTER (INPATIENT)
Facility: HOSPITAL | Age: 63
LOS: 4 days | Discharge: HOME | DRG: 623 | End: 2018-04-29
Attending: NEUROLOGICAL SURGERY | Admitting: NEUROLOGICAL SURGERY
Payer: COMMERCIAL

## 2018-04-25 ENCOUNTER — ANESTHESIA (OUTPATIENT)
Dept: PERIOP | Facility: HOSPITAL | Age: 63
DRG: 623 | End: 2018-04-25
Payer: COMMERCIAL

## 2018-04-25 ENCOUNTER — TRANSCRIBE ORDERS (OUTPATIENT)
Dept: RADIOLOGY | Facility: HOSPITAL | Age: 63
End: 2018-04-25

## 2018-04-25 ENCOUNTER — HOSPITAL ENCOUNTER (OUTPATIENT)
Dept: RADIOLOGY | Facility: HOSPITAL | Age: 63
Discharge: HOME | DRG: 623 | End: 2018-04-25
Payer: COMMERCIAL

## 2018-04-25 ENCOUNTER — ANESTHESIA EVENT (OUTPATIENT)
Dept: PERIOP | Facility: HOSPITAL | Age: 63
DRG: 623 | End: 2018-04-25
Payer: COMMERCIAL

## 2018-04-25 DIAGNOSIS — D35.2 PITUITARY MACROADENOMA (HCC): ICD-10-CM

## 2018-04-25 DIAGNOSIS — Z01.818 PREOP EXAMINATION: ICD-10-CM

## 2018-04-25 DIAGNOSIS — E23.0 HYPOGONADOTROPIC HYPOGONADISM (HCC): ICD-10-CM

## 2018-04-25 DIAGNOSIS — R79.89 ELEVATED INSULIN-LIKE GROWTH FACTOR 1 (IGF-1) LEVEL: ICD-10-CM

## 2018-04-25 PROBLEM — E22.0 ACROMEGALY (HCC): Status: ACTIVE | Noted: 2017-12-12

## 2018-04-25 PROBLEM — E23.6 PITUITARY MASS (HCC): Status: ACTIVE | Noted: 2018-04-25

## 2018-04-25 PROBLEM — E29.1 HYPOGONADISM MALE: Status: ACTIVE | Noted: 2017-10-24

## 2018-04-25 PROBLEM — E22.1 HYPERPROLACTINEMIA (HCC): Status: ACTIVE | Noted: 2017-10-31

## 2018-04-25 PROBLEM — IMO0002 HYPOGONADISM: Status: ACTIVE | Noted: 2018-03-14

## 2018-04-25 LAB
ABO GROUP BLD: NORMAL
ANION GAP SERPL CALCULATED.3IONS-SCNC: 9 MMOL/L (ref 4–13)
APTT PPP: 32 SECONDS (ref 23–35)
BLD GP AB SCN SERPL QL: NEGATIVE
BUN SERPL-MCNC: 19 MG/DL (ref 5–25)
CALCIUM SERPL-MCNC: 9 MG/DL (ref 8.3–10.1)
CHLORIDE SERPL-SCNC: 104 MMOL/L (ref 100–108)
CO2 SERPL-SCNC: 24 MMOL/L (ref 21–32)
CREAT SERPL-MCNC: 0.55 MG/DL (ref 0.6–1.3)
GFR SERPL CREATININE-BSD FRML MDRD: 111 ML/MIN/1.73SQ M
GLUCOSE SERPL-MCNC: 108 MG/DL (ref 65–140)
GLUCOSE SERPL-MCNC: 96 MG/DL (ref 65–140)
POTASSIUM SERPL-SCNC: 3.7 MMOL/L (ref 3.5–5.3)
RH BLD: POSITIVE
SODIUM SERPL-SCNC: 137 MMOL/L (ref 136–145)
SPECIMEN EXPIRATION DATE: NORMAL

## 2018-04-25 PROCEDURE — 86900 BLOOD TYPING SEROLOGIC ABO: CPT | Performed by: NEUROLOGICAL SURGERY

## 2018-04-25 PROCEDURE — 88341 IMHCHEM/IMCYTCHM EA ADD ANTB: CPT

## 2018-04-25 PROCEDURE — 88342 IMHCHEM/IMCYTCHM 1ST ANTB: CPT

## 2018-04-25 PROCEDURE — 0JB80ZZ EXCISION OF ABDOMEN SUBCUTANEOUS TISSUE AND FASCIA, OPEN APPROACH: ICD-10-PCS | Performed by: NEUROLOGICAL SURGERY

## 2018-04-25 PROCEDURE — 0GB04ZZ EXCISION OF PITUITARY GLAND, PERCUTANEOUS ENDOSCOPIC APPROACH: ICD-10-PCS | Performed by: OTOLARYNGOLOGY

## 2018-04-25 PROCEDURE — 82948 REAGENT STRIP/BLOOD GLUCOSE: CPT

## 2018-04-25 PROCEDURE — 88307 TISSUE EXAM BY PATHOLOGIST: CPT | Performed by: PATHOLOGY

## 2018-04-25 PROCEDURE — 86901 BLOOD TYPING SEROLOGIC RH(D): CPT | Performed by: NEUROLOGICAL SURGERY

## 2018-04-25 PROCEDURE — 80048 BASIC METABOLIC PNL TOTAL CA: CPT | Performed by: NEUROLOGICAL SURGERY

## 2018-04-25 PROCEDURE — 20926 PR REMV TISSUE FOR GRAFT OTHR: CPT | Performed by: NEUROLOGICAL SURGERY

## 2018-04-25 PROCEDURE — 88331 PATH CONSLTJ SURG 1 BLK 1SPC: CPT | Performed by: PATHOLOGY

## 2018-04-25 PROCEDURE — 70450 CT HEAD/BRAIN W/O DYE: CPT

## 2018-04-25 PROCEDURE — 88333 PATH CONSLTJ SURG CYTO XM 1: CPT | Performed by: PATHOLOGY

## 2018-04-25 PROCEDURE — 62165 REMOVE PITUIT TUMOR W/SCOPE: CPT | Performed by: NEUROLOGICAL SURGERY

## 2018-04-25 PROCEDURE — 85730 THROMBOPLASTIN TIME PARTIAL: CPT | Performed by: NEUROLOGICAL SURGERY

## 2018-04-25 PROCEDURE — 86850 RBC ANTIBODY SCREEN: CPT | Performed by: NEUROLOGICAL SURGERY

## 2018-04-25 RX ORDER — ROCURONIUM BROMIDE 10 MG/ML
INJECTION, SOLUTION INTRAVENOUS AS NEEDED
Status: DISCONTINUED | OUTPATIENT
Start: 2018-04-25 | End: 2018-04-25 | Stop reason: SURG

## 2018-04-25 RX ORDER — CHLORHEXIDINE GLUCONATE 0.12 MG/ML
15 RINSE ORAL ONCE
Status: DISCONTINUED | OUTPATIENT
Start: 2018-04-25 | End: 2018-04-25

## 2018-04-25 RX ORDER — FENTANYL CITRATE 50 UG/ML
25 INJECTION, SOLUTION INTRAMUSCULAR; INTRAVENOUS
Status: DISCONTINUED | OUTPATIENT
Start: 2018-04-25 | End: 2018-04-26

## 2018-04-25 RX ORDER — ALBUMIN, HUMAN INJ 5% 5 %
SOLUTION INTRAVENOUS CONTINUOUS PRN
Status: DISCONTINUED | OUTPATIENT
Start: 2018-04-25 | End: 2018-04-25 | Stop reason: SURG

## 2018-04-25 RX ORDER — MIDAZOLAM HYDROCHLORIDE 1 MG/ML
INJECTION INTRAMUSCULAR; INTRAVENOUS AS NEEDED
Status: DISCONTINUED | OUTPATIENT
Start: 2018-04-25 | End: 2018-04-25 | Stop reason: SURG

## 2018-04-25 RX ORDER — DOCUSATE SODIUM 100 MG/1
100 CAPSULE, LIQUID FILLED ORAL 2 TIMES DAILY
Status: DISCONTINUED | OUTPATIENT
Start: 2018-04-25 | End: 2018-04-26

## 2018-04-25 RX ORDER — POTASSIUM CHLORIDE 29.8 MG/ML
40 INJECTION INTRAVENOUS ONCE
Status: DISCONTINUED | OUTPATIENT
Start: 2018-04-25 | End: 2018-04-25

## 2018-04-25 RX ORDER — LIDOCAINE HYDROCHLORIDE AND EPINEPHRINE 10; 10 MG/ML; UG/ML
INJECTION, SOLUTION INFILTRATION; PERINEURAL AS NEEDED
Status: DISCONTINUED | OUTPATIENT
Start: 2018-04-25 | End: 2018-04-25 | Stop reason: HOSPADM

## 2018-04-25 RX ORDER — HEPARIN SODIUM 5000 [USP'U]/ML
5000 INJECTION, SOLUTION INTRAVENOUS; SUBCUTANEOUS EVERY 8 HOURS SCHEDULED
Status: DISCONTINUED | OUTPATIENT
Start: 2018-04-26 | End: 2018-04-29 | Stop reason: HOSPADM

## 2018-04-25 RX ORDER — ONDANSETRON 2 MG/ML
INJECTION INTRAMUSCULAR; INTRAVENOUS AS NEEDED
Status: DISCONTINUED | OUTPATIENT
Start: 2018-04-25 | End: 2018-04-25 | Stop reason: SURG

## 2018-04-25 RX ORDER — ONDANSETRON 4 MG/1
4 TABLET, ORALLY DISINTEGRATING ORAL EVERY 6 HOURS PRN
Status: DISCONTINUED | OUTPATIENT
Start: 2018-04-25 | End: 2018-04-26

## 2018-04-25 RX ORDER — CHLORHEXIDINE GLUCONATE 0.12 MG/ML
15 RINSE ORAL EVERY 12 HOURS SCHEDULED
Status: DISCONTINUED | OUTPATIENT
Start: 2018-04-25 | End: 2018-04-25

## 2018-04-25 RX ORDER — ONDANSETRON 2 MG/ML
4 INJECTION INTRAMUSCULAR; INTRAVENOUS ONCE AS NEEDED
Status: DISCONTINUED | OUTPATIENT
Start: 2018-04-25 | End: 2018-04-25

## 2018-04-25 RX ORDER — ACETAMINOPHEN 325 MG/1
650 TABLET ORAL EVERY 6 HOURS PRN
Status: DISCONTINUED | OUTPATIENT
Start: 2018-04-25 | End: 2018-04-29 | Stop reason: HOSPADM

## 2018-04-25 RX ORDER — PROPOFOL 10 MG/ML
INJECTION, EMULSION INTRAVENOUS AS NEEDED
Status: DISCONTINUED | OUTPATIENT
Start: 2018-04-25 | End: 2018-04-25 | Stop reason: SURG

## 2018-04-25 RX ORDER — SODIUM CHLORIDE, SODIUM LACTATE, POTASSIUM CHLORIDE, CALCIUM CHLORIDE 600; 310; 30; 20 MG/100ML; MG/100ML; MG/100ML; MG/100ML
50 INJECTION, SOLUTION INTRAVENOUS CONTINUOUS
Status: DISCONTINUED | OUTPATIENT
Start: 2018-04-25 | End: 2018-04-25

## 2018-04-25 RX ORDER — EPHEDRINE SULFATE 50 MG/ML
INJECTION, SOLUTION INTRAVENOUS AS NEEDED
Status: DISCONTINUED | OUTPATIENT
Start: 2018-04-25 | End: 2018-04-25 | Stop reason: SURG

## 2018-04-25 RX ORDER — LABETALOL HYDROCHLORIDE 5 MG/ML
10 INJECTION, SOLUTION INTRAVENOUS
Status: DISCONTINUED | OUTPATIENT
Start: 2018-04-25 | End: 2018-04-25

## 2018-04-25 RX ORDER — PROPOFOL 10 MG/ML
INJECTION, EMULSION INTRAVENOUS CONTINUOUS PRN
Status: DISCONTINUED | OUTPATIENT
Start: 2018-04-25 | End: 2018-04-25 | Stop reason: SURG

## 2018-04-25 RX ORDER — HEPARIN SODIUM 5000 [USP'U]/ML
5000 INJECTION, SOLUTION INTRAVENOUS; SUBCUTANEOUS EVERY 8 HOURS SCHEDULED
Status: DISCONTINUED | OUTPATIENT
Start: 2018-04-26 | End: 2018-04-25

## 2018-04-25 RX ORDER — SODIUM CHLORIDE 9 MG/ML
100 INJECTION, SOLUTION INTRAVENOUS CONTINUOUS
Status: DISCONTINUED | OUTPATIENT
Start: 2018-04-25 | End: 2018-04-26

## 2018-04-25 RX ORDER — ALBUTEROL SULFATE 2.5 MG/3ML
2.5 SOLUTION RESPIRATORY (INHALATION) ONCE AS NEEDED
Status: DISCONTINUED | OUTPATIENT
Start: 2018-04-25 | End: 2018-04-25

## 2018-04-25 RX ORDER — BISACODYL 10 MG
10 SUPPOSITORY, RECTAL RECTAL DAILY
Status: DISCONTINUED | OUTPATIENT
Start: 2018-04-26 | End: 2018-04-26

## 2018-04-25 RX ORDER — SODIUM CHLORIDE, SODIUM LACTATE, POTASSIUM CHLORIDE, CALCIUM CHLORIDE 600; 310; 30; 20 MG/100ML; MG/100ML; MG/100ML; MG/100ML
INJECTION, SOLUTION INTRAVENOUS CONTINUOUS PRN
Status: DISCONTINUED | OUTPATIENT
Start: 2018-04-25 | End: 2018-04-25 | Stop reason: SURG

## 2018-04-25 RX ORDER — FENTANYL CITRATE/PF 50 MCG/ML
50 SYRINGE (ML) INJECTION
Status: DISCONTINUED | OUTPATIENT
Start: 2018-04-25 | End: 2018-04-25

## 2018-04-25 RX ORDER — OXYCODONE HYDROCHLORIDE 5 MG/1
5 TABLET ORAL EVERY 4 HOURS PRN
Status: DISCONTINUED | OUTPATIENT
Start: 2018-04-25 | End: 2018-04-26

## 2018-04-25 RX ORDER — ONDANSETRON 2 MG/ML
4 INJECTION INTRAMUSCULAR; INTRAVENOUS EVERY 4 HOURS PRN
Status: DISCONTINUED | OUTPATIENT
Start: 2018-04-25 | End: 2018-04-29 | Stop reason: HOSPADM

## 2018-04-25 RX ORDER — LIDOCAINE HYDROCHLORIDE 10 MG/ML
INJECTION, SOLUTION INFILTRATION; PERINEURAL AS NEEDED
Status: DISCONTINUED | OUTPATIENT
Start: 2018-04-25 | End: 2018-04-25 | Stop reason: HOSPADM

## 2018-04-25 RX ORDER — METOCLOPRAMIDE HYDROCHLORIDE 5 MG/ML
10 INJECTION INTRAMUSCULAR; INTRAVENOUS ONCE AS NEEDED
Status: DISCONTINUED | OUTPATIENT
Start: 2018-04-25 | End: 2018-04-25

## 2018-04-25 RX ORDER — POTASSIUM CHLORIDE 14.9 MG/ML
20 INJECTION INTRAVENOUS
Status: COMPLETED | OUTPATIENT
Start: 2018-04-25 | End: 2018-04-26

## 2018-04-25 RX ORDER — EPINEPHRINE 1 MG/ML
INJECTION, SOLUTION, CONCENTRATE INTRAVENOUS AS NEEDED
Status: DISCONTINUED | OUTPATIENT
Start: 2018-04-25 | End: 2018-04-25 | Stop reason: HOSPADM

## 2018-04-25 RX ORDER — BISACODYL 10 MG
10 SUPPOSITORY, RECTAL RECTAL DAILY PRN
Status: DISCONTINUED | OUTPATIENT
Start: 2018-04-25 | End: 2018-04-26

## 2018-04-25 RX ORDER — SODIUM CHLORIDE, SODIUM LACTATE, POTASSIUM CHLORIDE, CALCIUM CHLORIDE 600; 310; 30; 20 MG/100ML; MG/100ML; MG/100ML; MG/100ML
50 INJECTION, SOLUTION INTRAVENOUS CONTINUOUS
Status: DISCONTINUED | OUTPATIENT
Start: 2018-04-25 | End: 2018-04-26

## 2018-04-25 RX ORDER — DOCUSATE SODIUM 100 MG/1
100 CAPSULE, LIQUID FILLED ORAL 2 TIMES DAILY
Status: DISCONTINUED | OUTPATIENT
Start: 2018-04-25 | End: 2018-04-25

## 2018-04-25 RX ORDER — SENNOSIDES 8.6 MG
1 TABLET ORAL
Status: DISCONTINUED | OUTPATIENT
Start: 2018-04-25 | End: 2018-04-29 | Stop reason: HOSPADM

## 2018-04-25 RX ORDER — SODIUM CHLORIDE 9 MG/ML
INJECTION, SOLUTION INTRAVENOUS CONTINUOUS PRN
Status: DISCONTINUED | OUTPATIENT
Start: 2018-04-25 | End: 2018-04-25 | Stop reason: SURG

## 2018-04-25 RX ORDER — FENTANYL CITRATE 50 UG/ML
INJECTION, SOLUTION INTRAMUSCULAR; INTRAVENOUS AS NEEDED
Status: DISCONTINUED | OUTPATIENT
Start: 2018-04-25 | End: 2018-04-25 | Stop reason: SURG

## 2018-04-25 RX ORDER — GLYCOPYRROLATE 0.2 MG/ML
INJECTION INTRAMUSCULAR; INTRAVENOUS AS NEEDED
Status: DISCONTINUED | OUTPATIENT
Start: 2018-04-25 | End: 2018-04-25 | Stop reason: SURG

## 2018-04-25 RX ORDER — PROMETHAZINE HYDROCHLORIDE 25 MG/ML
12.5 INJECTION, SOLUTION INTRAMUSCULAR; INTRAVENOUS ONCE AS NEEDED
Status: DISCONTINUED | OUTPATIENT
Start: 2018-04-25 | End: 2018-04-25

## 2018-04-25 RX ORDER — LATANOPROST 50 UG/ML
1 SOLUTION/ DROPS OPHTHALMIC
Status: DISCONTINUED | OUTPATIENT
Start: 2018-04-25 | End: 2018-04-29 | Stop reason: HOSPADM

## 2018-04-25 RX ORDER — OXYCODONE HYDROCHLORIDE 10 MG/1
10 TABLET ORAL EVERY 4 HOURS PRN
Status: DISCONTINUED | OUTPATIENT
Start: 2018-04-25 | End: 2018-04-26

## 2018-04-25 RX ADMIN — PHENYLEPHRINE HYDROCHLORIDE 30 MCG/MIN: 10 INJECTION INTRAVENOUS at 13:07

## 2018-04-25 RX ADMIN — Medication 0.2 MCG/KG/MIN: at 12:56

## 2018-04-25 RX ADMIN — LIDOCAINE HYDROCHLORIDE 50 MG: 20 INJECTION, SOLUTION INTRAVENOUS at 12:49

## 2018-04-25 RX ADMIN — FENTANYL CITRATE 100 MCG: 50 INJECTION, SOLUTION INTRAMUSCULAR; INTRAVENOUS at 12:49

## 2018-04-25 RX ADMIN — SODIUM CHLORIDE 100 ML/HR: 0.9 INJECTION, SOLUTION INTRAVENOUS at 21:22

## 2018-04-25 RX ADMIN — MIDAZOLAM 2 MG: 1 INJECTION INTRAMUSCULAR; INTRAVENOUS at 12:41

## 2018-04-25 RX ADMIN — LATANOPROST 1 DROP: 50 SOLUTION OPHTHALMIC at 21:41

## 2018-04-25 RX ADMIN — FENTANYL CITRATE 50 MCG: 50 INJECTION, SOLUTION INTRAMUSCULAR; INTRAVENOUS at 15:53

## 2018-04-25 RX ADMIN — ROCURONIUM BROMIDE 50 MG: 10 INJECTION INTRAVENOUS at 12:49

## 2018-04-25 RX ADMIN — SODIUM CHLORIDE: 0.9 INJECTION, SOLUTION INTRAVENOUS at 14:22

## 2018-04-25 RX ADMIN — GLYCOPYRROLATE 0.2 MG: 0.2 INJECTION, SOLUTION INTRAMUSCULAR; INTRAVENOUS at 13:22

## 2018-04-25 RX ADMIN — PROPOFOL 200 MG: 10 INJECTION, EMULSION INTRAVENOUS at 12:49

## 2018-04-25 RX ADMIN — ALBUMIN HUMAN: 0.05 INJECTION, SOLUTION INTRAVENOUS at 13:42

## 2018-04-25 RX ADMIN — POTASSIUM CHLORIDE 20 MEQ: 200 INJECTION, SOLUTION INTRAVENOUS at 21:18

## 2018-04-25 RX ADMIN — PROPOFOL 120 MCG/KG/MIN: 10 INJECTION, EMULSION INTRAVENOUS at 12:56

## 2018-04-25 RX ADMIN — SODIUM CHLORIDE, SODIUM LACTATE, POTASSIUM CHLORIDE, AND CALCIUM CHLORIDE: .6; .31; .03; .02 INJECTION, SOLUTION INTRAVENOUS at 12:36

## 2018-04-25 RX ADMIN — GLYCOPYRROLATE 0.4 MG: 0.2 INJECTION, SOLUTION INTRAMUSCULAR; INTRAVENOUS at 15:03

## 2018-04-25 RX ADMIN — NEOSTIGMINE METHYLSULFATE 3 MG: 1 INJECTION, SOLUTION INTRAMUSCULAR; INTRAVENOUS; SUBCUTANEOUS at 15:03

## 2018-04-25 RX ADMIN — ALBUMIN HUMAN: 0.05 INJECTION, SOLUTION INTRAVENOUS at 14:06

## 2018-04-25 RX ADMIN — EPHEDRINE SULFATE 5 MG: 50 INJECTION, SOLUTION INTRAMUSCULAR; INTRAVENOUS; SUBCUTANEOUS at 15:19

## 2018-04-25 RX ADMIN — Medication 2000 MG: at 13:10

## 2018-04-25 RX ADMIN — ONDANSETRON 4 MG: 2 INJECTION INTRAMUSCULAR; INTRAVENOUS at 15:03

## 2018-04-25 RX ADMIN — HYDROMORPHONE HYDROCHLORIDE 0.5 MG: 1 INJECTION, SOLUTION INTRAMUSCULAR; INTRAVENOUS; SUBCUTANEOUS at 14:29

## 2018-04-25 RX ADMIN — FENTANYL CITRATE 50 MCG: 50 INJECTION, SOLUTION INTRAMUSCULAR; INTRAVENOUS at 16:16

## 2018-04-25 RX ADMIN — SODIUM CHLORIDE: 0.9 INJECTION, SOLUTION INTRAVENOUS at 12:53

## 2018-04-25 RX ADMIN — EPHEDRINE SULFATE 5 MG: 50 INJECTION, SOLUTION INTRAMUSCULAR; INTRAVENOUS; SUBCUTANEOUS at 13:55

## 2018-04-25 RX ADMIN — Medication 1000 MG: at 20:43

## 2018-04-25 RX ADMIN — EPHEDRINE SULFATE 5 MG: 50 INJECTION, SOLUTION INTRAMUSCULAR; INTRAVENOUS; SUBCUTANEOUS at 15:13

## 2018-04-25 RX ADMIN — DEXAMETHASONE SODIUM PHOSPHATE 10 MG: 10 INJECTION INTRAMUSCULAR; INTRAVENOUS at 13:12

## 2018-04-25 NOTE — POST OP PROGRESS NOTES
Progress Note - Critical Care   Wisam Madrigal 60 y o  male MRN: [id number]  Unit/Bed#: [facility] TO[facility] Encounter: [id number]    Assessment:   Patient Active Problem List   Diagnosis    Hypogonadism    Pituitary macroadenoma ([address] )    Elevated insulin-like growth factor 1 (IGF-1) level    Pituitary mass ([address] )    Acromegaly ([address]    Hyperprolactinemia ([address] )    Hypogonadism male     POD #0 from:  Image guided endoscopic transsphenoidal approach for debulking of pituitary macroadenoma, abdominal fat graft harvesting   Image guided endoscopic transnasal approach for transsphenoidal surgery       Plan: ICU level of care for frequent neuro checks          Neuro:    Postop day number 0 from transsphenoidal resection of pituitary macroadenoma    Cough and sneeze precautions (keep mouth open, change dressings as needed)     Patient did expect a rate a small amount of blue material, nursing staff stated that they alerted the neurosurgeon to this  No nose blowing, per Neurosurgery    Frequent neuro checks q 2 hours    Analgesia:  Fentanyl 50 mcg every 3 minutes p r n  pain, Dilaudid 0 4 mg every 5 minutes p r n  pain, Dilaudid 0 5 mg every 3 hours p r n  pain, Tylenol 650 mg every 6 hours p r n  pain, oxycodone 4 mg q 4 hours p r n  Pain  Pain currently extremely well controlled 1-2/10, mild intermittent headache     Last dose of fentanyl at 1616 hours, otherwise has not received analgesic medication  Patient states that his preoperative blurry vision is significantly improved  [facility] ICU    Discussed with the patient ICU delirium and to alert either the staff for myself if he has any issues with this over night  Prophylactic delirium precautions (lights on during the day, lights off at night, clock visible, orienting materials including corrective glasses, board in the room with the date and day of the week)                        CV:     P r n  labetalol for systolic blood pressure greater than 160    Continue telemetry monitoring while in ICU    Monitor heart rate and blood pressure, intervene as indicated                 Lung:     Extubated, breathing comfortably on room air satting at 98%    Albuterol, Atrovent inhaler as needed    Incentive spirometer, prophylactically to prevent atelectasis                 GI:     Postoperatively diet has been advanced to full  Patient tolerating diet without nausea/vomiting    Patient obstipated, has not passed gas or bowel movement yet  No belching    Dulcoilax, will add senna colace    Continue to monitor, await return of bowel function  FEN:     Mild hypokalemia 3 7, will replete    Electrolytes otherwise within normal limits, will continue to monitor and replete as indicated  LR 50ml/h will discontinue once patient is consistently tolerating diet  Gu:    Maintain urinary Sol catheter overnight for accurate I&O  Voiding trial in a m  Patient had decreased urinary output intraoperatively, received 500 mL albumin, lactated Ringer's 1350 CC, intraoperatively    in PACU had substantial urinary output greater than 1 L  Continue to monitor urine output, intervene as indicated  ID:     Patient received preop Ancef 2 g  Continue to monitor for signs and symptoms of infection                  Heme:     Hemoglobin preop 14 2    SQ heparin for DVT prophylaxis                   Endo:     No acute concerns                            Msk/Skin:     Frequent repositioning per unit protocol    Prophylactic skin care per unit protocol                 Disposition:  ICU level of care for frequent neuro exams      Chief Complaint:  Postop day number 0 from transsphenoidal resection of macroadenoma    HPI/24hr events:   Postop day number 0 from transsphenoidal resection of macroadenoma  Tolerated the procedure well  Was seen and evaluated postoperatively  Pain 1-2/10  Not passing gas, no bowel movement  Tolerating full diet with no nausea or vomiting or abdominal distension  Urinary Sol catheter in place draining clear yellow urine, with brisk urine output of greater than 80 cc in the last 30 minutes     Physical Exam:   Physical Exam   Constitutional: He is oriented to person, place, and time  Adult male, appearing younger than stated age resting comfortably in the bed  Patient is pleasant and conversant able speak in full sentences alert and oriented x3 patient knows where he is, the procedure he had performed, the date and person  HENT:   Head: Normocephalic and atraumatic  Mouth/Throat: Oropharynx is clear and moist  No oropharyngeal exudate  Eyes: EOM are normal  No scleral icterus  Pupils 2 mm minimally reactive to light bilaterally  Neck: Normal range of motion  Neck supple  No JVD present  No thyromegaly present  Cardiovascular: Normal rate, regular rhythm and normal heart sounds  Exam reveals no gallop and no friction rub  No murmur heard  Pulmonary/Chest: Effort normal  No respiratory distress  He has no wheezes  He has no rales  Currently off of all supplemental oxygen  Abdominal: Soft  Bowel sounds are normal  He exhibits no distension  There is no tenderness  There is no rebound and no guarding  Genitourinary:   Genitourinary Comments: Urinary Sol catheter in place draining clear yellow urine to a bag over the side of the bed to gravity  Musculoskeletal: Normal range of motion  He exhibits no edema, tenderness or deformity  Spontaneously moving all 4 extremities, muscle strength 5/5 bilateral upper and lower extremities  Neurological: He is alert and oriented to person, place, and time  No cranial nerve deficit  Coordination normal    Skin: Skin is warm and dry  No rash noted  No erythema  Nursing note and vitals reviewed          Vitals:    04/25/18 1830 04/25/18 1845 04/25/18 1900 04/25/18 1930   BP: 119/81 126/85 127/91 124/81   Pulse: 66 78 64 (!) 50   Resp: 15 17 16 17   Temp:  98 5 °F (36 9 °C)     TempSrc:       SpO2: 96% 97% 96% 96%   Weight:       Height:         Arterial Line BP: 138/68  Arterial Line MAP (mmHg): 92 mmHg    Temperature:   Temp (24hrs), Av 8 °F (36 6 °C), Min:97 2 °F (36 2 °C), Max:98 5 °F (36 9 °C)    Current: Temperature: 98 5 °F (36 9 °C)    Weights:   IBW: 73 kg    Body mass index is 23 96 kg/m²  Weight (last 2 days)     Date/Time   Weight    18 1233  75 8 (167)              Hemodynamic Monitoring:  N/A     Non-Invasive/Invasive Ventilation Settings:  Respiratory    Lab Data (Last 4 hours)    None         O2/Vent Data (Last 4 hours)    None              No results found for: PHART, SCB8EYT, PO2ART, CLI5BPO, K4VFMMQH, BEART, SOURCE  SpO2: SpO2: 97 %    Intake and Outputs:  I/O        0701 -  0700  07 -  0700    I V  (mL/kg)  1450 (19 2)    IV Piggyback  500    Total Intake(mL/kg)  1950 (25 8)    Urine (mL/kg/hr)  1215    Blood  150    Total Output   1365    Net   +585              UOP: 150/hour   Nutrition:        Diet Orders            Start     Ordered    18 0001  Diet NPO; Sips with meds  Diet effective midnight     Question Answer Comment   Diet Type NPO    NPO Except: Sips with meds        18 193  Diet Regular; Regular House  Diet effective now     Comments:  Status post transsphenoidal macroadenoma resection  Allowed to drink to thirst   Have plenty of fluids by bedside  Strict I and O  record fluid input output q 2 hours   Question Answer Comment   Diet Type Regular    Regular Regular House    RD to adjust diet per protocol?  No        18          Labs:     Results from last 7 days  Lab Units 18  0637   SL AMB LAB HEMOGLOBIN g/dL 14 2   SL AMB HEMATOCRIT % 43 7   SL AMB PLATELET COUNT C82R2/      Results from last 7 days  Lab Units 18  1907 18  0637   SODIUM mmol/L 137  --    POTASSIUM mmol/L 3 7  --    CHLORIDE mmol/L 104  --    CO2 mmol/L 24  --    SL AMB UREA NITOGEN mg/dL  --  20   BUN mg/dL 19  --    CREATININE mg/dL 0 55* 0 73*   CALCIUM mg/dL 9 0  --    GLUCOSE RANDOM mg/dL 108  --                 Results from last 7 days  Lab Units 04/25/18  1907 04/19/18  0637   APTT sec  --  29   SL AMB INR   --  1 0   PTT seconds 32  --          No results found for: TROPONINI    Imaging:   Ct Head Without Contrast  Result Date: 4/25/2018  Impression: Localization data provided for intrasellar mass  Workstation performed: [id number]    I have personally reviewed pertinent reports        Micro:  No results found for: Cm Baez, WOUNDCULT, SPUTUMCULTUR    Allergies: No Known Allergies    Medications:   Scheduled Meds:  Current Facility-Administered Medications:  acetaminophen 650 mg Oral Q6H PRN Evette Salmeron MD   albuterol 2 5 mg Nebulization Once PRN Kaylie Holly MD   [START ON 4/26/2018] bisacodyl 10 mg Rectal Daily Evette Salmeron MD   chlorhexidine 15 mL Swish & Spit Once Naraalexi Camacho MD   chlorhexidine 15 mL Swish & Spit Q12H [address] Timur Moody PA-C   fentaNYL 50 mcg Intravenous Q3 min PRN Kaylie Holly MD   [START ON 4/26/2018] heparin (porcine) 5,000 Units Subcutaneous [facility] Evette Salmeron MD   HYDROmorphone 0 4 mg Intravenous Q5 Min PRN Kyalie Holly MD   HYDROmorphone 0 5 mg Intravenous Q3H PRN Evette Salmeron MD   ipratropium 0 5 mg Nebulization Once PRN Kaylie Holly MD   labetalol 10 mg Intravenous Q10 Min PRN Yury Robles CRNA   lactated ringers 50 mL/hr Intravenous Continuous Kaylie Holly MD   lactated ringers 50 mL/hr Intravenous Continuous Yury Robles CRNA   metoclopramide 10 mg Intravenous Once PRN Kaylie Holly MD   ondansetron 4 mg Intravenous Once PRN Kaylie Holly MD   ondansetron 4 mg Oral Q6H PRN Evette Salmeron MD   oxyCODONE 5 mg Oral Q4H PRN Evette Salmeron MD   promethazine 12 5 mg Intravenous Once PRN Kaylie Holly MD     Continuous Infusions:  lactated ringers 50 mL/hr   lactated ringers 50 mL/hr     PRN Meds:    acetaminophen 650 mg Q6H PRN   albuterol 2 5 mg Once PRN   fentaNYL 50 mcg Q3 min PRN   HYDROmorphone 0 4 mg Q5 Min PRN   HYDROmorphone 0 5 mg Q3H PRN   ipratropium 0 5 mg Once PRN   labetalol 10 mg Q10 Min PRN   metoclopramide 10 mg Once PRN   ondansetron 4 mg Once PRN   ondansetron 4 mg Q6H PRN   oxyCODONE 5 mg Q4H PRN   promethazine 12 5 mg Once PRN       VTE Pharmacologic Prophylaxis: Heparin next dose will be initiated on 04/26 a postoperative day 1  At 24 hours postoperatively  VTE Mechanical Prophylaxis: sequential compression device    Invasive lines and devices: Invasive Devices     Peripheral Intravenous Line            Peripheral IV 04/25/18 Left Hand less than 1 day    Peripheral IV 04/25/18 Right Hand less than 1 day          Arterial Line            Arterial Line 04/25/18 Left Radial less than 1 day          Drain            Urethral Catheter Latex 16 Fr  less than 1 day                   Counseling / Coordination of Care  Total Critical Care time spent 45 minutes excluding procedures, teaching and family updates  Code Status: No Order     Portions of the record may have been created with voice recognition software  Occasional wrong word or "sound a like" substitutions may have occurred due to the inherent limitations of voice recognition software  Read the chart carefully and recognize, using context, where substitutions have occurred       Isi Loaiza DO

## 2018-04-25 NOTE — ANESTHESIA PREPROCEDURE EVALUATION
Review of Systems/Medical History  Patient summary reviewed  Chart reviewed  No history of anesthetic complications     Cardiovascular  EKG reviewed, Negative cardio ROS    Pulmonary  Negative pulmonary ROS        GI/Hepatic  Negative GI/hepatic ROS          Negative  ROS        Endo/Other  Negative endo/other ROS      GYN  Negative gynecology ROS          Hematology  Negative hematology ROS      Musculoskeletal  Negative musculoskeletal ROS        Neurology  Negative neurology ROS      Psychology   Negative psychology ROS              Physical Exam    Airway    Mallampati score: II  TM Distance: >3 FB  Neck ROM: full     Dental   No notable dental hx     Cardiovascular  Comment: Negative ROS, Cardiovascular exam normal    Pulmonary  Pulmonary exam normal Breath sounds clear to auscultation,     Other Findings        Anesthesia Plan  ASA Score- 2     Anesthesia Type- general with ASA Monitors  Additional Monitors: arterial line  Airway Plan: ETT  Plan Factors- Patient instructed to abstain from smoking on day of procedure       Induction- intravenous  Postoperative Plan- Plan for postoperative opioid use  Planned trial extubation    Informed Consent- Anesthetic plan and risks discussed with patient  I personally reviewed this patient with the CRNA  Discussed and agreed on the Anesthesia Plan with the CRNA             Lab Results   Component Value Date    HGB 14 2 04/19/2018    HCT 43 7 04/19/2018    MCV 97 04/19/2018     04/19/2018     Lab Results   Component Value Date    GLUCOSE 87 01/25/2018    BUN 20 04/19/2018    CREATININE 0 73 (L) 04/19/2018     Lab Results   Component Value Date    INR 1 0 04/19/2018     No results found for: PTT  Type and Screen:  A        I, Dr Marty Bahena, the attending physician, have personally seen and evaluated the patient prior to anesthetic care  I have reviewed the pre-anesthetic record, and other medical records if appropriate to the anesthetic care  If a CRNA is involved in the case, I have reviewed the CRNA assessment, if present, and agree  The patient is in a suitable condition to proceed with my formulated anesthetic plan

## 2018-04-25 NOTE — PROGRESS NOTES
Patient stated that he had the sniffles and took a breath through his nose and a small blue foreign body came out of his mouth, Dr Lindsey Martínez notified, no new orders received, will continue to monitor

## 2018-04-25 NOTE — ANESTHESIA PROCEDURE NOTES
Arterial Line Insertion  Date/Time: 4/25/2018 12:58 PM  Performed by: Sharon Villar by: Conner Montgomery   Consent: Written consent obtained  Risks and benefits: risks, benefits and alternatives were discussed  Consent given by: patient  Patient understanding: patient states understanding of the procedure being performed  Patient consent: the patient's understanding of the procedure matches consent given  Procedure consent: procedure consent matches procedure scheduled  Relevant documents: relevant documents present and verified  Test results: test results available and properly labeled  Site marked: the operative site was marked  Imaging studies: imaging studies available  Required items: required blood products, implants, devices, and special equipment available  Patient identity confirmed: arm band  Time out: Immediately prior to procedure a "time out" was called to verify the correct patient, procedure, equipment, support staff and site/side marked as required    Indications: multiple ABGs and hemodynamic monitoring  Orientation:  LeftLocation: radial artery  Needle gauge: 20  Seldinger technique: Seldinger technique used  Number of attempts: 1  Post-procedure: dressing applied  Patient tolerance: Patient tolerated the procedure well with no immediate complications

## 2018-04-25 NOTE — ANESTHESIA POSTPROCEDURE EVALUATION
Post-Op Assessment Note      CV Status:  Stable    Mental Status:  Alert and awake    Hydration Status:  Euvolemic    PONV Controlled:  Controlled    Airway Patency:  Patent    Post Op Vitals Reviewed: Yes          Staff: CRNA           BP   142/94   Temp (!) (P) 97 2 °F (36 2 °C) (04/25/18 1540)    Pulse (P) 73 (04/25/18 1540)   Resp   17   SpO2 (P) 100 % (04/25/18 1540)

## 2018-04-25 NOTE — OP NOTE
OPERATIVE REPORT  PATIENT NAME: Enma Rosen    :  1955  MRN: [id number]  Pt Location: [facility] OR ROOM 17    SURGERY DATE: 2018    Surgeon(s) and Role:  Panel 1:     * Vita Gtz MD - Primary    Panel 2:     * Ankit Santiago MD - Primary    Preop Diagnosis:  Hypogonadotropic hypogonadism ([address] ) [E23 0]  Pituitary macroadenoma ([address] ) [D35 2]  Elevated insulin-like growth factor 1 (IGF-1) level [R79 89]    Post-Op Diagnosis Codes:     * Hypogonadotropic hypogonadism ([address] ) [E23 0]     * Pituitary macroadenoma ([address] ) [D35 2]     * Elevated insulin-like growth factor 1 (IGF-1) level [R79 89]    Procedure(s) (LRB):  Image guided endoscopic transsphenoidal approach for debulking of pituitary macroadenoma, abdominal fat graft harvesting (N/A)  Image guided endoscopic transnasal approach for transsphenoidal surgery (N/A)    Specimen(s):  ID Type Source Tests Collected by Time Destination   1 : Intrasellar mass Tissue Brain TISSUE [place] Delia Gtz MD 2018 1421    2 : Intrasellar mass Tissue Brain TISSUE Mark Tomlinson MD 2018 1426        Estimated Blood Loss:   150 mL    Drains:  Urethral Catheter Latex 16 Fr  (Active)   Collection Container Standard drainage bag 2018 12:54 PM   Number of days: 0       Anesthesia Type:   General    Operative Indications:  Hypogonadotropic hypogonadism ([address] ) [E23 0]  Pituitary macroadenoma ([address] ) [D35 2]  Elevated insulin-like growth factor 1 (IGF-1) level [R79 89]      Operative Findings:  Large sellar mass    Complications:   None    Procedure and Technique:  The patient was identified and brought into the operating room and place on the operating table  General anesthesia was induced  The patient was prepped in the usual fashion  The image guidance system was calibrated and seen to be accurate  The nasal cavity was packed with 4% soaked cocaine pledgets  The patient was draped in the usual fashion   A time out was performed and the operation was begun  The cocaine pledgets were removed and a 0 degree endoscope was introduced into the nose  The region of the left sphenopalatine arteries and nasal septum was injected with 1% lidocaine with 1:100,000 epinephrine for a measure of hemostasis  The middle and superior turbinates were gently lateralized with a freer elevator  The space between the turbinates and septum was then decongested with thrombin and epinephrine soaked pledges, revealing the natural ostium of the sphenoid sinus  This was confirmed with image guidance  Next, a nasoseptal flap was harvested from the right nasal septal mucosa  Three cuts were made with needle tipped cautery, creating a rectangular flap pedicled off the nasal septal artery  The mucosal flap was dissected from the nasal septum in a submucoperichondrial plane using a freer elevator  The upper horizontal cut was begun posteriorly at the height of the natural ostium of the sphenoid sinus  After dissection, the flap was stored in the nasopharynx for use later in the case  Next, a partial posterior septectomy was performed  The anterior cut was made at a point just posterior to the head of the inferior turbinate along the septum with a freer elevator  The bony perpendicular plate of the ethmoid was  from the cribriform region sharply and removed from the nose for skull base reconstruction later in the case  The remaining posterior mucoperichondrial flaps were then removed with a microdebrider, cauterizing their free edges with suction cautery  This revealed the sphenoid keel and sphenoid ostia  The midline was again confirmed with image guidance  The endoscope was then placed on a scope corbett  The ostia were widened with a Kerrison rongeur and the keel was drilled away with a high speed drill using a cutting jesus  The intersinus septum was reduced in a similar fashion    This revealed the bony wall of the sella, as confirmed with image guidance  Again, hemostasis was achieved with suction cautery, saline irrigation and thrombin and epinephrine pledgets  Sphenoid sinus mucosa was removed in order to prevent a mucocele once the flap was inset  At this point, Dr Kari Cuevas performed the tumor removal, to be dictated separately  After tumor removal, the skull base was reconstructed as described in Dr James Ramirez note  The skull base reconstruction was then completed by me  The fat graft harvested earlier in the operation was inserted into the sella taking care to avoid compression of the pituitary gland and diaphragmatic sella by over packing  Next a septal bone graft harvested earlier in the case was cut to shape and inserted in an underlay fashion between the dura and the skull base  The nasoseptal flap was rotated into position, mucosal side out, to cover the sellar skull base repair  This mucosal flap filled the sphenoid sinus on the left  It was pressed flush against the skull base using a neuropatty under endoscopic guidance  The sphenoid was then filled with duraseal, covering all free edges of the graft  Finally, the nasal cavity free mucosal edges were again cauterized with suction cautery and Floseal was used to fill the nasal cavity  The patient was then awakened from general anesthesia and transported to the PACU in stable condition        I was present for the entire procedure    Patient Disposition:  PACU     SIGNATURE: Hector Saini MD  DATE: April 25, 2018  TIME: 3:34 PM

## 2018-04-25 NOTE — PROGRESS NOTES
Dr Arnulfo Bateman at bedside to assess patient, aware of total fluid in 1750 crystals and 500 albumin between OR and PACU, urine output of 1210, no new orders received, will continue to monitor

## 2018-04-25 NOTE — H&P (VIEW-ONLY)
Assessment/Plan:    Very pleasant 60-year-old male, presents for preoperative evaluation, he has planned "image guided endoscopic transsphenoidal approach for debulk in a pituitary macroadenoma abdominal fat graft harvesting with possible placement of lumbar drain", scheduled for 4/25/18  The procedure is scheduled with Dr Ortiz Lamas, and the patient reports Dr Ortiz Lamas has explained in a very detailed fashion the proposed surgical procedure, risks and possible complications, as well as the benefits of the procedure  He expresses understanding of this information/explanation, and is in agreement with the proposed procedure and wishes to proceed  He is a life time nonsmoker  He has a prior history of surgery performed under general anesthesia which was tolerated without complication    He denies SOB or CP with activity  He denies bleeding disorder or history of same  He denies chronic pulmonary conditions  He is under the care of a primary care provider and is followed regularly for wellness  He denies medication allergies  He understands to discontinue aspirin, or NSAIDS, 7 days prior to his surgery  Postoperative activities were briefly reviewed, he understands he is to lift no greater than 10 pounds until follow-up in approximately 6 weeks, he also understands to avoid immersion in water for approximately 4 weeks, and additionally understands he may ambulate as tolerated and is encouraged to do so  Pre operative skin preparation was discussed with the patient and he understand to wash/ shower with Hibiclens (chlorhexidine) and utilize Jakub cloths as directed  These findings, impressions and recommendations are reviewed in great detail with the patient, he expressed understanding and agreement, his questions were answered completely and to his satisfaction           Diagnoses and all orders for this visit:    Preop examination  -     MRI brain pituitary wo and w contrast; Future    Pituitary macroadenoma ([facility] [address] )  -     MRI brain pituitary wo and w contrast; Future          Subjective:      Patient ID: Danilo Pate is a 60 y o  male  Very pleasant 28-year-old male, presents for preoperative evaluation  He has planned follow-up with his primary care provider for clearance 4/19/18  He has planned laboratory testing 4/19/18  He has planned surgery as noted, image guided endoscopic transsphenoidal approach for debulking of pituitary macroadenoma and abdominal fat graft harvesting with possible placement of lumbar drain  He reports he had a episode of far in body in his eyes, met with his ophthalmologist for management of this condition, underwent complete ophthalmological examination was diagnosed with glaucoma as well as visual field defect  He was further referred to endocrinology for additional evaluation, imaging revealed a pituitary macroadenoma  The following portions of the patient's history were reviewed and updated as appropriate: allergies, current medications, past family history, past medical history, past social history and past surgical history  Review of Systems   Constitutional: Negative  HENT: Negative  Eyes: Positive for visual disturbance  Negative for photophobia, pain, discharge, redness and itching  Respiratory: Negative  Cardiovascular: Negative  Gastrointestinal: Negative  Endocrine: Negative  Genitourinary: Negative  Musculoskeletal: Positive for myalgias  Negative for arthralgias, back pain, gait problem, joint swelling, neck pain and neck stiffness  Skin: Negative  Allergic/Immunologic: Negative  Neurological: Negative  Hematological: Negative  Psychiatric/Behavioral: Negative  Objective:    Physical Exam   Constitutional: He is oriented to person, place, and time  He appears well-developed and well-nourished  HENT:   Head: Normocephalic and atraumatic  Neck: Normal range of motion  Cardiovascular: Normal rate, regular rhythm and normal heart sounds  Pulmonary/Chest: Effort normal and breath sounds normal    Abdominal: Soft  Bowel sounds are normal    Musculoskeletal: Normal range of motion  Neurological: He is alert and oriented to person, place, and time  Gait normal    Skin: Skin is warm and dry  Psychiatric: He has a normal mood and affect  Neurologic Exam     Mental Status   Oriented to person, place, and time     Level of consciousness: alert    Motor Exam   Muscle bulk: normal  Overall muscle tone: normal    Gait, Coordination, and Reflexes     Gait  Gait: normal

## 2018-04-26 LAB
ANION GAP SERPL CALCULATED.3IONS-SCNC: 9 MMOL/L (ref 4–13)
BASOPHILS # BLD AUTO: 0 THOUSANDS/ΜL (ref 0–0.1)
BASOPHILS NFR BLD AUTO: 0 % (ref 0–1)
BUN SERPL-MCNC: 25 MG/DL (ref 5–25)
CALCIUM SERPL-MCNC: 8.5 MG/DL (ref 8.3–10.1)
CHLORIDE SERPL-SCNC: 106 MMOL/L (ref 100–108)
CO2 SERPL-SCNC: 23 MMOL/L (ref 21–32)
CREAT SERPL-MCNC: 0.64 MG/DL (ref 0.6–1.3)
EOSINOPHIL # BLD AUTO: 0 THOUSAND/ΜL (ref 0–0.61)
EOSINOPHIL NFR BLD AUTO: 0 % (ref 0–6)
ERYTHROCYTE [DISTWIDTH] IN BLOOD BY AUTOMATED COUNT: 14 % (ref 11.6–15.1)
GFR SERPL CREATININE-BSD FRML MDRD: 104 ML/MIN/1.73SQ M
GLUCOSE SERPL-MCNC: 151 MG/DL (ref 65–140)
HCT VFR BLD AUTO: 39.1 % (ref 36.5–49.3)
HGB BLD-MCNC: 13.4 G/DL (ref 12–17)
INR PPP: 1.09 (ref 0.86–1.16)
LYMPHOCYTES # BLD AUTO: 1.07 THOUSANDS/ΜL (ref 0.6–4.47)
LYMPHOCYTES NFR BLD AUTO: 12 % (ref 14–44)
MAGNESIUM SERPL-MCNC: 1.9 MG/DL (ref 1.6–2.6)
MCH RBC QN AUTO: 31.9 PG (ref 26.8–34.3)
MCHC RBC AUTO-ENTMCNC: 34.3 G/DL (ref 31.4–37.4)
MCV RBC AUTO: 93 FL (ref 82–98)
MONOCYTES # BLD AUTO: 0.46 THOUSAND/ΜL (ref 0.17–1.22)
MONOCYTES NFR BLD AUTO: 5 % (ref 4–12)
NEUTROPHILS # BLD AUTO: 7.68 THOUSANDS/ΜL (ref 1.85–7.62)
NEUTS SEG NFR BLD AUTO: 83 % (ref 43–75)
NRBC BLD AUTO-RTO: 0 /100 WBCS
OSMOLALITY UR/SERPL-RTO: 289 MMOL/KG (ref 282–298)
PHOSPHATE SERPL-MCNC: 3.7 MG/DL (ref 2.3–4.1)
PLATELET # BLD AUTO: 263 THOUSANDS/UL (ref 149–390)
PMV BLD AUTO: 9.5 FL (ref 8.9–12.7)
POTASSIUM SERPL-SCNC: 4 MMOL/L (ref 3.5–5.3)
PROTHROMBIN TIME: 14.1 SECONDS (ref 12.1–14.4)
RBC # BLD AUTO: 4.2 MILLION/UL (ref 3.88–5.62)
SODIUM SERPL-SCNC: 138 MMOL/L (ref 136–145)
WBC # BLD AUTO: 9.24 THOUSAND/UL (ref 4.31–10.16)

## 2018-04-26 PROCEDURE — G8978 MOBILITY CURRENT STATUS: HCPCS

## 2018-04-26 PROCEDURE — 97163 PT EVAL HIGH COMPLEX 45 MIN: CPT

## 2018-04-26 PROCEDURE — 85610 PROTHROMBIN TIME: CPT | Performed by: NEUROLOGICAL SURGERY

## 2018-04-26 PROCEDURE — G8988 SELF CARE GOAL STATUS: HCPCS

## 2018-04-26 PROCEDURE — 83735 ASSAY OF MAGNESIUM: CPT | Performed by: NEUROLOGICAL SURGERY

## 2018-04-26 PROCEDURE — G8987 SELF CARE CURRENT STATUS: HCPCS

## 2018-04-26 PROCEDURE — 84100 ASSAY OF PHOSPHORUS: CPT | Performed by: NEUROLOGICAL SURGERY

## 2018-04-26 PROCEDURE — 99232 SBSQ HOSP IP/OBS MODERATE 35: CPT | Performed by: INTERNAL MEDICINE

## 2018-04-26 PROCEDURE — 85025 COMPLETE CBC W/AUTO DIFF WBC: CPT | Performed by: NEUROLOGICAL SURGERY

## 2018-04-26 PROCEDURE — 97166 OT EVAL MOD COMPLEX 45 MIN: CPT

## 2018-04-26 PROCEDURE — 83930 ASSAY OF BLOOD OSMOLALITY: CPT | Performed by: NEUROLOGICAL SURGERY

## 2018-04-26 PROCEDURE — G8979 MOBILITY GOAL STATUS: HCPCS

## 2018-04-26 PROCEDURE — 80048 BASIC METABOLIC PNL TOTAL CA: CPT | Performed by: NEUROLOGICAL SURGERY

## 2018-04-26 RX ORDER — OXYCODONE HYDROCHLORIDE 5 MG/1
5 TABLET ORAL EVERY 4 HOURS PRN
Status: DISCONTINUED | OUTPATIENT
Start: 2018-04-26 | End: 2018-04-29 | Stop reason: HOSPADM

## 2018-04-26 RX ORDER — BUTALBITAL, ACETAMINOPHEN AND CAFFEINE 50; 325; 40 MG/1; MG/1; MG/1
1 TABLET ORAL EVERY 4 HOURS PRN
Status: DISCONTINUED | OUTPATIENT
Start: 2018-04-26 | End: 2018-04-29 | Stop reason: HOSPADM

## 2018-04-26 RX ORDER — DOCUSATE SODIUM 100 MG/1
100 CAPSULE, LIQUID FILLED ORAL 2 TIMES DAILY
Status: DISCONTINUED | OUTPATIENT
Start: 2018-04-26 | End: 2018-04-29 | Stop reason: HOSPADM

## 2018-04-26 RX ORDER — METOCLOPRAMIDE 10 MG/1
10 TABLET ORAL EVERY 6 HOURS PRN
Status: DISCONTINUED | OUTPATIENT
Start: 2018-04-26 | End: 2018-04-29 | Stop reason: HOSPADM

## 2018-04-26 RX ADMIN — BUTALBITAL, ACETAMINOPHEN, AND CAFFEINE 1 TABLET: 50; 325; 40 TABLET ORAL at 17:46

## 2018-04-26 RX ADMIN — DOCUSATE SODIUM 100 MG: 100 CAPSULE, LIQUID FILLED ORAL at 08:53

## 2018-04-26 RX ADMIN — HEPARIN SODIUM 5000 UNITS: 5000 INJECTION, SOLUTION INTRAVENOUS; SUBCUTANEOUS at 21:30

## 2018-04-26 RX ADMIN — SENNOSIDES 8.6 MG: 8.6 TABLET, FILM COATED ORAL at 21:29

## 2018-04-26 RX ADMIN — Medication 1000 MG: at 04:37

## 2018-04-26 RX ADMIN — ACETAMINOPHEN 650 MG: 325 TABLET, FILM COATED ORAL at 20:38

## 2018-04-26 RX ADMIN — LATANOPROST 1 DROP: 50 SOLUTION OPHTHALMIC at 21:33

## 2018-04-26 RX ADMIN — METOCLOPRAMIDE HYDROCHLORIDE 10 MG: 10 TABLET ORAL at 20:38

## 2018-04-26 RX ADMIN — POTASSIUM CHLORIDE 20 MEQ: 200 INJECTION, SOLUTION INTRAVENOUS at 02:26

## 2018-04-26 RX ADMIN — DOCUSATE SODIUM 100 MG: 100 CAPSULE, LIQUID FILLED ORAL at 19:11

## 2018-04-26 NOTE — PLAN OF CARE
Problem: PHYSICAL THERAPY ADULT  Goal: Performs mobility at highest level of function for planned discharge setting  See evaluation for individualized goals  Treatment/Interventions: Elevations, Bed mobility, Gait training, OT, Spoke to nursing, Functional transfer training  Equipment Recommended:  (NONE)       See flowsheet documentation for full assessment, interventions and recommendations  Prognosis: Good  Problem List: Decreased endurance, Impaired balance, Decreased mobility, Pain  Assessment: PT COMPLETED EVALUATION OF 60YEAR OLD MALE ADMITTED TO [facility] ON 4/25/18 FOR THE FOLLOWING PLANNED PROCEDURE: "Image guided endoscopic transsphenoidal approach for debulking of pituitary macroadenoma, abdominal fat graft harvesting (N/A) Image guided endoscopic transnasal approach for transsphenoidal surgery (N/A)" WITH NEUROSURGERY SECONDARY TO DIAGNOSIS OF PITUITARY MACROADENOMA  CURRENT MEDICAL AND PHYSICAL INSTABILITIES INCLUDE PAIN, CONTINUED PACU ADMISSION, CONTINUOUS O2/HR/BP MONITORING, FALLS RISK, AND A REGRESSION IN FUNCTIONAL STATUS FROM BASELINE  PMH IS SIGNIFICANT FOR PITUITARY MACROADEMONA, GLAUCOMA, AND HYPOGANADOTROPIC HYPOGONADISM  PRIOR TO THIS ADMISSION PATIENT RESIDED ALONE IN [facility] HOME (2ND FLOOR SET UP) WHERE HE WAS PREVIOUSLY I WITH MOBILITY (NO AD), ADLS, AND IADLS  CURRENT IMPAIRMENTS INCLUDE PAIN, DIZZINESS, DECREASED ACTIVITY TOLERANCE, AND FALLS RISK  DURING PT EVALUATION PATIENT REQUIRED S FOR SUPINE-->SIT TRANSFER AND SIT<-->STAND TRANSFERS  HE REQUIRED MIN-AX1 TO AMBULATE 10 FEET X 2 W/ HHA SECONDARY TO REPORTED DIZZINESS AND APPARENT UNSTEADINESS  ANTICIPATE WITH IMPROVED MEDICAL STATUS DIZZINESS WILL RESOLVE AND PATIENT WILL NOT REQUIRE AD UPON D/C  NEXT SESSION PLAN TO PROGRESS AMBULATORY DISTANCE AND TRIAL STAIRS  RECOMMEND D/C HOME I WHEN GOALS ARE ACHIEVED  PATIENT WILL BENEFIT FROM CONTINUED SKILLED INPT PT THIS ADMISSION TO ACHIEVE MAXIMAL FUNCTION AND SAFETY  Recommendation: (S) Home independently     PT - OK to Discharge: (S) No (CLEAR STAIRS)    See flowsheet documentation for full assessment     Keri Sheets PT

## 2018-04-26 NOTE — CASE MANAGEMENT
Initial Clinical Review    Age/Sex: 60 y o  male    Surgery Date: 4/25/18    Procedure:   Surgeon(s) and Role:  Panel 1:     * Rodney Amyaa MD - Primary     Panel 2:     * Birdie Macedo MD - Primary     Preop Diagnosis:  Hypogonadotropic hypogonadism ([address] ) [E23 0]  Pituitary macroadenoma ([address] ) [D35 2]  Elevated insulin-like growth factor 1 (IGF-1) level [R79 89]     Post-Op Diagnosis Codes:     * Hypogonadotropic hypogonadism ([address] ) [E23 0]     * Pituitary macroadenoma ([address] ) [D35 2]     * Elevated insulin-like growth factor 1 (IGF-1) level [R79 89]     Procedure(s) (LRB):  Image guided endoscopic transsphenoidal approach for debulking of pituitary macroadenoma, abdominal fat graft harvesting (N/A)  Image guided endoscopic transnasal approach for transsphenoidal surgery (N/A)     Anesthesia: General     Admission Orders: Date/Time/Statement: 4/25/18 @ 2030     Orders Placed This Encounter   Procedures    Inpatient Admission     Standing Status:   Standing     Number of Occurrences:   1     Order Specific Question:   Admitting Physician     Answer: Zachary Sewell [[id number]]     Order Specific Question:   Level of Care     Answer:   Critical Care [15]     Order Specific Question:   Estimated length of stay     Answer:   More than 2 Midnights     Order Specific Question:   Certification     Answer:   I certify that inpatient services are medically necessary for this patient for a duration of greater than two midnights  See H&P and MD Progress Notes for additional information about the patient's course of treatment  Vital Signs: /77   Pulse 70   Temp 98 6 °F (37 °C)   Resp 16   Ht 5' 10" (1 778 m)   Wt 75 8 kg (167 lb)   SpO2 96%   BMI 23 96 kg/m²     Diet:        Diet Orders            Start     Ordered    04/25/18 2031  Diet Regular; Regular House  Diet effective now     Question Answer Comment   Diet Type Regular    Regular Regular House    RD to adjust diet per protocol?  No        04/25/18 2030        Mobility: OOB 3 x daily     DVT Prophylaxis:   SCDs  Heparin 5000 u Sq Q 8 hrs    Pain Control:   PRN Meds:   acetaminophen    bisacodyl    ondansetron    oxyCODONE      Thank you,  [facility] in the [facility] by [place] for 2017  Network Utilization Review Department  Phone: [phone]; Fax [phone]  ATTENTION: The Network Utilization Review Department is now centralized for our 7 Facilities  Make a note that we have a new phone and fax numbers for our Department  Please call with any questions or concerns to [phone] and carefully follow the prompts so that you are directed to the right person  All voicemails are confidential  Fax any determinations, approvals, denials, and requests for initial or continue stay review clinical to [phone]  Due to HIGH CALL volume, it would be easier if you could please send faxed requests to expedite your requests and in part, help us provide discharge notifications faster

## 2018-04-26 NOTE — CASE MANAGEMENT
PENDING Star Quiros #[id number]   Thank you,  [address] in the [facility] by [address] for 2017  Network Utilization Review Department  Phone: [phone]; Fax [phone]  ATTENTION: The Network Utilization Review Department is now centralized for our 7 Facilities  Make a note that we have a new phone and fax numbers for our Department  Please call with any questions or concerns to [phone] and carefully follow the prompts so that you are directed to the right person  All voicemails are confidential  Fax any determinations, approvals, denials, and requests for initial or continue stay review clinical to [phone]  Due to HIGH CALL volume, it would be easier if you could please send faxed requests to expedite your requests and in part, help us provide discharge notifications faster

## 2018-04-26 NOTE — PERIOPERATIVE NURSING NOTE
As per [place], with neurosurgery, ct scan to be done today en route to pt's room  Will continue to monitor

## 2018-04-26 NOTE — PHYSICAL THERAPY NOTE
Physical Therapy Evaluation     Patient's Name: Airam Chavira    Admitting Diagnosis  Hypogonadotropic hypogonadism ([address] ) [E23 0]  Pituitary macroadenoma ([address] ) [D35 2]  Elevated insulin-like growth factor 1 (IGF-1) level [R79 89]    Problem List  Patient Active Problem List   Diagnosis    Hypogonadism    Pituitary macroadenoma ([address] )    Elevated insulin-like growth factor 1 (IGF-1) level    Pituitary mass ([address] )    Acromegaly ([address] )    Hyperprolactinemia ([address] )    Hypogonadism male       Past Medical History  Past Medical History:   Diagnosis Date    Decreased stamina     Feeling tired     Glaucoma     right eye    Lipoma     Lipoma of left shoulder     Muscle weakness     Open-angle glaucoma     left eye severe    Pituitary mass ([address] )        Past Surgical History  Past Surgical History:   Procedure Laterality Date    ANKLE SURGERY      lipoma    FINGER TENDON REPAIR      HAND SURGERY      KNEE ARTHROSCOPY      KS NEUROENDOSCOP,EXC,PIT MAKI,TRANSNAS/SPHEN N/A 4/25/2018    Procedure: Image guided endoscopic transsphenoidal approach for debulking of pituitary macroadenoma, abdominal fat graft harvesting;  Surgeon: Sam Malone MD;  [facility]: [facility];  Service: Neurosurgery    SHOULDER SURGERY      lipoma removal        04/26/18 1100   Note Type   Note type Eval only   Pain Assessment   Pain Assessment 0-10   Pain Score 1   Pain Type Acute pain   Pain Location Head   Pain Orientation Bilateral   Home Living   Type of [address] Two level   Home Equipment (DENIES)   Prior Function   Level of [place] Independent with ADLs and functional mobility   Lives With Alone   ADL Assistance Independent   IADLs Independent   Falls in the last 6 months 0   Vocational Full time employment   Restrictions/Precautions   Weight Bearing Precautions Per Order No   Braces or Orthoses (NONE)   Other Precautions Pain; Fall Risk;Telemetry;Multiple lines   General   Family/Caregiver Present No Cognition   Overall Cognitive Status WFL   RUE Assessment   RUE Assessment WFL   LUE Assessment   LUE Assessment WFL   RLE Assessment   RLE Assessment WFL   LLE Assessment   LLE Assessment WFL   Bed Mobility   Supine to Sit 5  Supervision   Sit to Supine 5  Supervision   Transfers   Sit to Stand 5  Supervision   Stand to Sit 5  Supervision   Ambulation/Elevation   Gait pattern Excessively slow   Gait Assistance 4  Minimal assist  (CONTACT GUARD )   Additional items Assist x 1   Assistive Device Other (Comment)  (HHA)   Distance 20 FEET   Balance   Static Sitting Good   Static Standing Fair   Ambulatory Fair -   Endurance Deficit   Endurance Deficit Yes   Endurance Deficit Description DIZZINESS   Activity Tolerance   Activity Tolerance Patient tolerated treatment well   Medical Staff Made Aware OT BONNIE   Nurse Made Aware RN SANDEEP    Assessment   Prognosis Good   Problem List Decreased endurance; Impaired balance;Decreased mobility;Pain   Assessment PT COMPLETED EVALUATION OF 60YEAR OLD MALE ADMITTED TO [facility] ON 4/25/18 FOR THE FOLLOWING PLANNED PROCEDURE: "Image guided endoscopic transsphenoidal approach for debulking of pituitary macroadenoma, abdominal fat graft harvesting (N/A) Image guided endoscopic transnasal approach for transsphenoidal surgery (N/A)" WITH NEUROSURGERY SECONDARY TO DIAGNOSIS OF PITUITARY MACROADENOMA  CURRENT MEDICAL AND PHYSICAL INSTABILITIES INCLUDE PAIN, CONTINUED PACU ADMISSION, CONTINUOUS O2/HR/BP MONITORING, FALLS RISK, AND A REGRESSION IN FUNCTIONAL STATUS FROM BASELINE  PMH IS SIGNIFICANT FOR PITUITARY MACROADEMONA, GLAUCOMA, AND HYPOGANADOTROPIC HYPOGONADISM  PRIOR TO THIS ADMISSION PATIENT RESIDED ALONE IN [facility] HOME (2ND FLOOR SET UP) WHERE HE WAS PREVIOUSLY I WITH MOBILITY (NO AD), ADLS, AND IADLS  CURRENT IMPAIRMENTS INCLUDE PAIN, DIZZINESS, DECREASED ACTIVITY TOLERANCE, AND FALLS RISK   DURING PT EVALUATION PATIENT REQUIRED S FOR SUPINE-->SIT TRANSFER AND SIT<-->STAND TRANSFERS  HE REQUIRED MIN-AX1 TO AMBULATE 10 FEET X 2 W/ HHA SECONDARY TO REPORTED DIZZINESS AND APPARENT UNSTEADINESS  ANTICIPATE WITH IMPROVED MEDICAL STATUS DIZZINESS WILL RESOLVE AND PATIENT WILL NOT REQUIRE AD UPON D/C  NEXT SESSION PLAN TO PROGRESS AMBULATORY DISTANCE AND TRIAL STAIRS  RECOMMEND D/C HOME I WHEN GOALS ARE ACHIEVED  PATIENT WILL BENEFIT FROM CONTINUED SKILLED INPT PT THIS ADMISSION TO ACHIEVE MAXIMAL FUNCTION AND SAFETY  Goals   Patient Goals NONE EXPRESSED   STG Expiration Date 05/03/18   Short Term Goal #1 2-7 DAYS: 1) COMPLETE BED MOBILITY I; 2) PERFORM SIT<-->STAND TRANSFER I; 3) AMBULATE 300 FEET I; 4) NAVIGATE 12 STEPS I; 5) IMPROVE BALANCE BY 1 GRADE   Treatment Day 0   Plan   Treatment/Interventions Elevations; Bed mobility;Gait training;OT;Spoke to nursing; Functional transfer training   PT Frequency 5x/wk   Recommendation   Recommendation Home independently   Equipment Recommended (NONE)   PT - OK to Discharge No  (CLEAR STAIRS)   Barthel Index   Feeding 10   Bathing 5   Grooming Score 5   Dressing Score 5   Bladder Score 10   Bowels Score 10   Toilet Use Score 10   Transfers (Bed/Chair) Score 10   Mobility (Level Surface) Score 0   Stairs Score 0   Barthel Index Score 65       Rick Grimaldo, PT

## 2018-04-26 NOTE — PERIOPERATIVE NURSING NOTE
Report received from [place] Blank, [facility] wnl  Will continue to monitor

## 2018-04-26 NOTE — PERIOPERATIVE NURSING NOTE
Dr Sanjeev Piña md, present at bedside  As per dr Sanjeev Piña md, no ct scan needed today  Will continue to monitor

## 2018-04-26 NOTE — OCCUPATIONAL THERAPY NOTE
[address]  Evaluation     Patient Name: Collette Kind AGIDO'M Date: 4/26/2018  Problem List  Patient Active Problem List   Diagnosis    Hypogonadism    Pituitary macroadenoma ([address] )    Elevated insulin-like growth factor 1 (IGF-1) level    Pituitary mass ([address] )    Acromegaly ([address] )    Hyperprolactinemia ([address] )    Hypogonadism male     Past Medical History  Past Medical History:   Diagnosis Date    Decreased stamina     Feeling tired     Glaucoma     right eye    Lipoma     Lipoma of left shoulder     Muscle weakness     Open-angle glaucoma     left eye severe    Pituitary mass ([address] )      Past Surgical History  Past Surgical History:   Procedure Laterality Date    ANKLE SURGERY      lipoma    FINGER TENDON REPAIR      HAND SURGERY      KNEE ARTHROSCOPY      MD NEUROENDOSCOP,EXC,PIT MAKI,TRANSNAS/SPHEN N/A 4/25/2018    Procedure: Image guided endoscopic transsphenoidal approach for debulking of pituitary macroadenoma, abdominal fat graft harvesting;  Surgeon: Haylee Ramey MD;  [facility];  Service: Neurosurgery    SHOULDER SURGERY      lipoma removal         04/26/18 1101   Note Type   Note type Eval/Treat   Restrictions/Precautions   Weight Bearing Precautions Per Order No   Other Precautions Pain; Fall Risk;Telemetry;Multiple lines   Pain Assessment   Pain Assessment 0-10   Pain Score 1   Pain Type Acute pain   Pain Location Head   Pain Orientation Bilateral   Hospital Pain Intervention(s) Repositioned   Response to Interventions TOLERATED   Home Living   Type of [address] [place] [address] Two level;Bed/bath upstairs   Bathroom Shower/Tub Tub/shower unit   Bathroom Toilet Standard   Bathroom Accessibility Accessible   Additional Comments NO DME USE AT BASELINE      Prior Function   Level of [place] Independent with ADLs and functional mobility   Lives With Alone   Receives Help From Family  (LOCAL SISTER)   ADL Assistance Independent   IADLs Independent   Falls in the last 6 months 0   Vocational Full time employment   Lifestyle   Autonomy PT REPORTS BASELINE INDEPENDENCE IN ADLS/IADLS/DRIVING  NO DME USE AT BASELINE AND NO RECENT H/O FALLS  Reciprocal Relationships PT RESIDES ALONE  REPORTS A LOCAL SISTER THAT CAN ASSIST PRN  Service to Others PT WORKS FT AS A   Intrinsic Gratification PT ENJOYS READING AND FLYING PLANES  Psychosocial   Psychosocial (WDL) WDL   Subjective   Subjective "MY HEAD JUST FEELS A LITTLE OFF THIS MORNING"   ADL   Where Assessed Edge of bed   Eating Assistance 7  Independent   Grooming Assistance 5  Supervision/Setup   UB Bathing Assistance 5  Supervision/Setup   LB Bathing Assistance 4  Minimal Assistance   UB Dressing Assistance 5  Supervision/Setup   LB Dressing Assistance 4  Minimal Assistance   Toileting Assistance  5  Supervision/Setup   Bed Mobility   Supine to Sit 5  Supervision   Sit to Supine 5  Supervision   Transfers   Sit to Stand 5  Supervision   Stand to Sit 5  Supervision   Functional Mobility   Functional Mobility 4  Minimal assistance   Additional Comments PT AMBULATING W/ CGA W/IN PACU THIS AM USING NO DME      Additional items Hand hold assistance   Balance   Static Sitting Good   Dynamic Sitting Fair +   Static Standing Fair   Dynamic Standing [id number] -   Activity Tolerance   Activity Tolerance Patient tolerated treatment well   Medical Staff Made Aware PHYSICAL THERAPIST MISAEL   Nurse Made Aware OKAY TO SEE PER KRISTINA HOPKINS   RUCORINNE Assessment   RUE Assessment WFL   LUE Assessment   LUE Assessment WFL   Hand Function   Gross Motor Coordination Functional   Fine Motor Coordination Functional   Vision-Basic Assessment   Current Vision Does not wear glasses   Visual History Glaucoma   Vision - Complex Assessment   Ocular Range of Motion WFL   Head Position WDL   Tracking Able to track stimulus in all quads without difficulty   Acuity Able to read employee name badge without difficulty   Cognition   Overall Cognitive Status WFL   Arousal/Participation Alert   Attention Within functional limits   Orientation Level Oriented X4   Memory Within functional limits   Following Commands Follows multistep commands without difficulty   Comments PT ENGAGES IN APPROPRIATE CONVERSATION W/ THERAPIST THIS AM     Assessment   Limitation Decreased ADL status; Decreased self-care trans;Decreased high-level ADLs; Decreased endurance  (BALANCE, MEDICAL STATUS)   Prognosis Good   Assessment PT IS A 61 YO M ADMIT FOR IMAGE GUIDED ENDOSCOPIC TRANSSPHENOIDAL APPROACH FOR DEBULKING OF PITUITARY MACROADENOMA AND ABDOMINAL FAT GRAFT HARVESTING 4/25  PT EVALUATED BY OPTHALMOLOGY PRE OP FOR VISUAL DEFICITS DUE TO COMPRESSION OVERLYING OPTIC CHIASM  PT W/ ADD'L PMH SIGNIFICANT FOR GLAUCOMA  PT IS FROM HOME ALONE AND W/ BASELINE INDEPENDENCE IN ADLS/IADLS/DRIVING  NO DME USE AT BASELINE AND NO RECENT H/O FALLS  CURRENTLY PT COMPLETING UB ADLS W/ SBA AND REQUIRING MIN A LB ADLS  PT ADD'L COMPLETING FUNCTIONAL TRANSFERS W/ SBA AND REQUIRING CGA/MIN FOR FUNCTIONAL MOBILITY USING NO DME  PT REPORTS IMPROVEMENT IN VISION POST OPERATIVELY  PT APPEARS LIMITED AT THIS TIME 2* IMPAIRED BALANCE, ACTIVITY TOLERANCE, MILD PAIN AND ADL IMPAIRMENTS  AT THIS TIME, OT DOES ANTICIPATE RETURN HOME W/ INCREASED FAMILY SUPPORT  WILL CONTINUE TO FOLLOW PT 3-5X/WEEK IN ORDER TO MEET THE BELOW DESCRIBED GOALS IN 10-14 DAYS  Goals   Patient Goals PT WOULD LIKE TO RETURN HOME AT D/C  LTG Time Frame 10-14   Long Term Goal #1 PLEASE SEE BELOW DESCRIBED GOALS  Plan   Treatment Interventions ADL retraining;Functional transfer training;Patient/family training;Equipment evaluation/education;Visual perceptual retraining; Compensatory technique education;Continued evaluation; Activityengagement; Energy conservation   Goal Expiration Date 05/10/18   OT Frequency 3-5x/wk   Recommendation   OT Discharge Recommendation Home with family support   OT - OK to Discharge (PENDING MEDICAL STABILITY  )   Barthel Index   Feeding 10   Bathing 0   Grooming Score 5   Dressing Score 5   Bladder Score 10   Bowels Score 10   Toilet Use Score 5   Transfers (Bed/Chair) Score 10   Mobility (Level Surface) Score 0   Stairs Score 0   Barthel Index Score 55   Modified [place] Scale   Modified Prem Scale 4     GOALS TO BE MET IN 10-14 DAYS:    1) Pt will increase bed mobility to MOD I and transfer EOB to participate in functional activity with G tolerance and balance  2) Pt will improve functional transfers to MOD I on/off all surfaces using DME PRN w/ G balance/safety including toileting  3) Pt will increase independence in all ADLS to MOD I with G balance sitting upright in chair  4) Pt will complete toileting w/ MOD I w/ G hygiene/thoroughness using DME PRN  5) Pt will improve activity tolerance to G for min 30 min txment sessions  6) Pt will participate in light grooming task with MOD I using setup standing at sink ~3-5mins with G safety and balance  7) Pt will engage in ongoing  assessments, screens, and activities t/o fx'l I/ADL/leisure tasks w/ G participation and mod I to A w/ adaptation and accommodations or rule out visual perceptual impairments  (peripheral vision)    8) Pt will engage in skilled education re: safety precautions and compensatory strategies for ADL/IADL completion to assist with safe D/C planning       DOCUMENTATION COMPLETED BY Kings Alfonso MS, OTR/L

## 2018-04-26 NOTE — OP NOTE
OPERATIVE REPORT  PATIENT NAME: Jaci Priest    :  1955  MRN: [id number]  Pt Location: BE OR ROOM 17    SURGERY DATE: 2018    Surgeon(s) and Role:  Panel 1:     * Lyssa Gutierrez MD - Primary    Panel 2:     * Aminta Parnell MD - Primary    Preop Diagnosis:  Hypogonadotropic hypogonadism ([address] ) [E23 0]  Pituitary macroadenoma ([address] ) [D35 2]  Elevated insulin-like growth factor 1 (IGF-1) level [R79 89]    Post-Op Diagnosis Codes:     * Hypogonadotropic hypogonadism ([address] ) [E23 0]     * Pituitary macroadenoma ([address] ) [D35 2]     * Elevated insulin-like growth factor 1 (IGF-1) level [R79 89]    Procedure(s) (LRB):  Image guided endoscopic transsphenoidal approach for debulking of pituitary macroadenoma, abdominal fat graft harvesting (N/A)  Image guided endoscopic transnasal approach for transsphenoidal surgery (N/A)    Specimen(s):  ID Type Source Tests Collected by Time Destination   1 : Intrasellar mass Tissue Brain TISSUE EXAM Lyssa Gutierrez MD 2018  2:21 PM    2 : Intrasellar mass Tissue Brain TISSUE EXAM Lyssa Gutierrez MD 2018  2:26 PM        Estimated Blood Loss:   150 mL    Drains:  Urethral Catheter Latex 16 Fr  (Active)   Site Assessment Clean;Skin intact 2018  6:45 PM   Collection Container Standard drainage bag 2018  6:45 PM   Securement Method Securing device (Describe) 2018  6:45 PM   Output (mL) 1150 mL 2018  6:45 PM   Number of days: 0       Anesthesia Type:   General    Operative Indications:  Hypogonadotropic hypogonadism ([address] ) [E23 0]  Pituitary macroadenoma ([address] ) [D35 2]  Elevated insulin-like growth factor 1 (IGF-1) level [R79 89]      Operative Findings:  Pituitary macroadenoma    Complications:   None    Procedure and Technique:  After adequate general endotracheal anesthesia the patient was placed supine on the operating table    The head was placed into 3 point head fixation device and the stealth neuro navigational system was registered and calibrated  Image guidance with the use of the [address] was used throughout this case  Images were carefully loaded into the system and used for preoperative planning as well as intraoperative guidance it was critically useful for navigation and avoidance of critically important structures  The right anterior abdominal region was prepped with ChloraPrep as was the nares  Double layer drapes were placed in normal fashion  A time-out was called and all parameters a time-out were followed  In the right abdominal region the skin was injected with 1 percent lidocaine with 1 100,000 epinephrine  A 15  Blade was used to incise the skin  The Bovie was utilized to harvest a small 1 cubic centimeter of fat  The area was closed with interrupted inverted 3 0 Vicryl suture in the subcutaneous tissues and a subcuticular suture was utilized to close the skin  Benzoin and Steri-Strips were placed clean sterile dressing was placed  The procedure began with Dr Roseanna Favre performing an endoscopic approach  To the posterior sphenoid  At this point the endoscope was parked into the left nares and the right and left nares were utilized  To introduce instruments  The rostral portion of the sphenoid was drilled after carefully identifying the positions of the cavernous sinus  The anterior portion of the dura covering the sella was opened in a cruciate fashion bipolar coagulation was utilized to maintain hemostasis  Ring curettes were utilized to debulk the tumor and the diaphragmatic sella was observed to be pulsatile and intact following this debulking  A portion of this mass was sent to the laboratory as specimen which was as returned as being consistent with a pituitary adenoma  There was no  CSF leak  Next,   The anterior portion of the dura was reconstructed with a portion of the abdominal fat graft harvest   DuraSeal was then placed over this    A small piece of bone harvested from the posterior septum  the endoscope was removed  The patient was taken out of pins, extubated and taken to the recovery room having tolerated the procedure well     I was present for the entire procedure    Patient Disposition:  PACU     SIGNATURE: Bailey Cortes MD  DATE: April 25, 2018  TIME: 8:19 PM

## 2018-04-26 NOTE — PROGRESS NOTES
Patient cough up moderate sized blood clot, Critical Care resident aware and at bedside, no new orders received, will continue to monitor

## 2018-04-26 NOTE — POST OP PROGRESS NOTES
Progress Note - Neurosurgery   Maria Guadalupe Larios 60 y o  male MRN: [id number]  Unit/Bed#: [facility] OVR Encounter: [id number]    Assessment:  1  POD1 s/p image guided endoscopic transsphenoidal approach to debulking of pituitary macroadenoma  2  hypoganadotropic hypogonadism  3  Elevated IGF-1  4  History of glaucoma  5  Acromegaly  6  hypoprolactinemia    Plan:  · Neuro exam: GCS15, AAOx3, non-focal exam, minimal serosanguinous nasal drainage  · Continue Q1 neuro checks, call with exam change  · Post-op management  · Monitor for DI   · strict IOs- call if >250cc urine output/ hour  (approximately /2 hours overnight) If elevated urine output will likely order urine osm, serum osm, specific gravity  · Na 138, continue to monitor for hypernatremia  · Urine osm 289  · Monitor for CSF leak  · Call if thin nasal drainage, positional headaches, PND with salty taste in mouth  · Serosanguinous fluid is likely inflammatory as opposed to CSF- no positional headaches  · No straws, sneezing/coughing precautions  · Headache control- well controlled on current regimen  · Monitor for visual changes, no issues at current  · Follows with endo outpatient, does not take any supplements  Will need close OP follow-up  · Continue bowel regimen, avoid straining  · zofran PRN nausea, avoid retching   · Clear to initiate heparin SQ for DVT ppx in addition to SCDs and mobilization  · Await PT/OT input  · [facility] consulted during ICU observation  · Discussed with team, clear to transfer to medical surgical unit  · Neurosurgery will continue to follow as primary team, call with questions or concerns  · Nurse provider rounds completed with Abdi Lester RN    Subjective/Objective   Chief Complaint: " I'm all stuffed up "    Subjective: patient reports having significant nasal congestion and is unable to breathe through his nose  He describes his headache as minimal (1-2/10)  He admits to blurry vision present pre-op improving already   Feels he is "less achy" than prior to procedure  Denies PND, positional headache, salty taste in the back of his mouth, excessive thirst or urine output  No flatus or BM yet, no abdominal pain  Objective: sitting up in bed, eating breakfast    I/O       04/24 0701 - 04/25 0700 04/25 0701 - 04/26 0700 04/26 0701 - 04/27 0700    P  O   555     I V  (mL/kg)  2598 3 (34 3)     IV Piggyback  650     Total Intake(mL/kg)  3803 3 (50 2)     Urine (mL/kg/hr)  1865     Blood  150     Total Output   2015      Net   +1788 3                   Invasive Devices     Peripheral Intravenous Line            Peripheral IV 04/25/18 Left Hand less than 1 day    Peripheral IV 04/25/18 Right Hand less than 1 day                Physical Exam:  Vitals: Blood pressure 120/77, pulse 70, temperature 98 6 °F (37 °C), resp  rate 16, height 5' 10" (1 778 m), weight 75 8 kg (167 lb), SpO2 96 %  ,Body mass index is 23 96 kg/m²  General appearance: alert, appears stated age, cooperative and no distress  Head: slight serosanguinous drainage from b/l nares wiped away with tissue  Eyes: EOMI, PERRL, acuity intact in all fields     Lungs: non labored breathing  Heart: regular heart rate  Neurologic:   Mental status: Alert, oriented, thought content appropriate, speech is clear and fluent  Cranial nerves: grossly intact (Cranial nerves II-XII)  Sensory: normal to light touch in UE and LE  Motor: full strength in b/l UE and LE  Coordination: finger to nose normal bilaterally, no drift bilaterally      Lab Results:    Results from last 7 days  Lab Units 04/26/18  0434   WBC Thousand/uL 9 24   HEMOGLOBIN g/dL 13 4   HEMATOCRIT % 39 1   PLATELETS Thousands/uL 263   NEUTROS PCT % 83*   MONOS PCT % 5       Results from last 7 days  Lab Units 04/26/18  0434 04/25/18  1907   SODIUM mmol/L 138 137   POTASSIUM mmol/L 4 0 3 7   CHLORIDE mmol/L 106 104   CO2 mmol/L 23 24   BUN mg/dL 25 19   CREATININE mg/dL 0 64 0 55*   CALCIUM mg/dL 8 5 9 0   GLUCOSE RANDOM mg/dL 151* 108 Results from last 7 days  Lab Units 04/26/18  0434   MAGNESIUM mg/dL 1 9       Results from last 7 days  Lab Units 04/26/18  0434   PHOSPHORUS mg/dL 3 7       Results from last 7 days  Lab Units 04/26/18  0434 04/25/18  1907   INR  1 09  --    PTT seconds  --  32       Imaging Studies: I have personally reviewed pertinent reports  and I have personally reviewed pertinent films in PACS    EKG, Pathology, and Other Studies: I have personally reviewed pertinent reports        VTE Pharmacologic Prophylaxis: Heparin    VTE Mechanical Prophylaxis: sequential compression device and foot pump applied

## 2018-04-26 NOTE — PROGRESS NOTES
Progress Note - Critical Care   Charlette Coffey 60 y o  male MRN: [id number]  [facility] Encounter: [id number]  Code Status: No Order    Assessment and Plan:      Neuro:     Pituitary macroadenoma: Status post transsphenoidal resection with abdominal fat graft, POD #1  Adenoma appeared to be secreting, possibly [address] and prolactin  -GCS 15  Neurological examination was nonfocal  Cranial nerves were intact  Speech was fluent without aphasia or dysarthria  Strength was 5/5 in the bilateral upper and lower extremities     -Sinus precautions  No straws, nose blowing, straining  Patient did expectorate a small blue object, Neurosurgery aware  He also expectorated a small blood clot with no further episodes  CV:     -Goal systolic blood pressure under 160, 10 mg labetalol ordered PRN every 6 hours      -No acute issues  Maintain MAP above 65  Lung:      -No acute issues  On room air with no respiratory distress  Maintain oxygen saturations above 92%  GI:     -Full diet  Tolerating well  -Bowel regimen senna 8 6 mg HS and colace 100 mg BID  Dulcolax suppository 10 mg PRN  FEN:     -Fluids with NS at 100 mL/hr  Will discontinue as patient is tolerating PO without difficulty  Net 1788 3 mL positive     -No acute issues  Replete electrolytes PRN, goal potassium at least 4, goal magnesium at least 2, goal phosphorus at least 3  Will replete potassium  :     -No acute issues  Monitor UOP, goal at least 0 5 mL/kg/hr  Appears good, with output 1865 mL  Serum osmolality 289     -May discontinue Sol catheter  ID:     -No acute issues  Monitor fever curve and WBC count  -Afebrile  No leukocytosis  Heme:     -No acute issues  DVT prophylaxis with heparin and SCDs  Transfuse if hemoglobin falls below 7  Hemoglobin 13 4  Endo:     -Patient has a history of hypogonadotropic hypogonadism  He also has a history of acromegaly with elevated IGF   Follows with Endocrinology as an outpatient      -No acute issues  Avoid hyperglycemia; goal blood glucose below 180  Msk/Skin:     -No acute issues  Frequent repositioning every 2 hours for pressure ulcer prophylaxis  Disposition: May transfer   ______________________________________________________________________    Chief Complaint: None  HPI/24hr events: Status post uncomplicated transsphenoidal resection of pituitary macroadenoma     ______________________________________________________________________    Physical Exam   Constitutional: He is oriented to person, place, and time  He appears well-developed and well-nourished  No distress  HENT:   Head: Normocephalic and atraumatic  Eyes: EOM are normal  Pupils are equal, round, and reactive to light  Neck: Normal range of motion  Neck supple  Cardiovascular: Normal rate and regular rhythm  No murmur heard  Pulmonary/Chest: Effort normal  No respiratory distress  He has no wheezes  He has no rales  Abdominal: Soft  Bowel sounds are normal  He exhibits no distension  Surgical site in right lower quadrant is clean, dry, intact  Dressed with band-aid  Musculoskeletal: Normal range of motion  He exhibits no edema  Neurological: He is alert and oriented to person, place, and time  GCS 15  Neurological examination was nonfocal  Cranial nerves were intact  Speech was fluent without aphasia or dysarthria  Strength was 5/5 in the bilateral upper and lower extremities  Skin: Skin is warm and dry  Psychiatric: He has a normal mood and affect  His behavior is normal    Nursing note and vitals reviewed        ______________________________________________________________________  Vitals:    04/26/18 0300 04/26/18 0400 04/26/18 0500 04/26/18 0600   BP:    116/72   Pulse: 58 (!) 52 62 56   Resp: 18 16 18 15   Temp:  99 °F (37 2 °C)     TempSrc:  Tympanic     SpO2: 98% 97% 96% 96%   Weight:       Height:         Arterial Line BP: 122/62  Arterial Line MAP (mmHg): 84 mmHg     Temperature:   Temp (24hrs), Av 5 °F (36 9 °C), Min:97 2 °F (36 2 °C), Max:99 8 °F (37 7 °C)    Current Temperature: 99 °F (37 2 °C)    Weights:   IBW: 73 kg    Body mass index is 23 96 kg/m²  Weight (last 2 days)     Date/Time   Weight    18 1233  75 8 (167)              Hemodynamic Monitoring:  N/A       Non-Invasive/Invasive Ventilation Settings:  Respiratory    Lab Data (Last 4 hours)    None         O2/Vent Data (Last 4 hours)    None              No results found for: PHART, BFW2RIH, PO2ART, QBF4NFV, D9LDNEHP, BEART, SOURCE  SpO2: SpO2: 96 %    Intake and Outputs:  I/O       701 -  07 -  07 0700    P  O   555     I V  (mL/kg)  2598 3 (34 3)     IV Piggyback  650     Total Intake(mL/kg)  3803 3 (50 2)     Urine (mL/kg/hr)  1865     Blood  150     Total Output         Net   +1788 3                   Nutrition:        Diet Orders            Start     Ordered    18  Diet Regular; Regular House  Diet effective now     Question Answer Comment   Diet Type Regular    Regular Regular House    RD to adjust diet per protocol?  No        18          Labs:     Results from last 7 days  Lab Units 18  0434   WBC Thousand/uL 9 24   HEMOGLOBIN g/dL 13 4   HEMATOCRIT % 39 1   PLATELETS Thousands/uL 263   NEUTROS PCT % 83*   MONOS PCT % 5        Results from last 7 days  Lab Units 18  0434 18  1907   SODIUM mmol/L 138 137   POTASSIUM mmol/L 4 0 3 7   CHLORIDE mmol/L 106 104   CO2 mmol/L 23 24   BUN mg/dL 25 19   CREATININE mg/dL 0 64 0 55*   CALCIUM mg/dL 8 5 9 0   GLUCOSE RANDOM mg/dL 151* 108       Results from last 7 days  Lab Units 18  0434   MAGNESIUM mg/dL 1 9       Results from last 7 days  Lab Units 18  0434   PHOSPHORUS mg/dL 3 7        Results from last 7 days  Lab Units 18  0434 18  1907   INR  1 09  --    PTT seconds  --  32               Imaging: I have personally reviewed pertinent films in PACS  Allergies: No Known Allergies  Medications:   Scheduled Meds:    Current Facility-Administered Medications:  acetaminophen 650 mg Oral Q6H PRN Sara Huang MD   bisacodyl 10 mg Rectal Daily PRN Vita Gtz MD   docusate sodium 100 mg Oral BID Isabela Chambers DO   fentanyl citrate (PF) 25 mcg Intravenous Q1H PRN Vita Gtz MD   heparin (porcine) 5,000 Units Subcutaneous [facility] Vita Gtz MD   HYDROmorphone 0 5 mg Intravenous Q3H PRN Sara Huang MD   HYDROmorphone 1 mg Intravenous Q4H PRN Vita Gtz MD   latanoprost 1 drop Both Eyes HS Vita Gtz MD   ondansetron 4 mg Intravenous Q4H PRN Vita Gtz MD   ondansetron 4 mg Oral Q6H PRN Sara Huang MD   oxyCODONE 10 mg Oral Q4H PRN Vita Gtz MD   oxyCODONE 5 mg Oral Q4H PRN Sara Huang MD   senna 1 tablet Oral HS Isabela Chambers DO     Continuous Infusions:   PRN Meds:    acetaminophen 650 mg Q6H PRN   bisacodyl 10 mg Daily PRN   fentanyl citrate (PF) 25 mcg Q1H PRN   HYDROmorphone 0 5 mg Q3H PRN   HYDROmorphone 1 mg Q4H PRN   ondansetron 4 mg Q4H PRN   ondansetron 4 mg Q6H PRN   oxyCODONE 10 mg Q4H PRN   oxyCODONE 5 mg Q4H PRN       VTE Pharmacologic Prophylaxis: Heparin  VTE Mechanical Prophylaxis: sequential compression device  Invasive lines and devices:   Invasive Devices     Peripheral Intravenous Line            Peripheral IV 04/25/18 Left Hand less than 1 day    Peripheral IV 04/25/18 Right Hand less than 1 day          Arterial Line            Arterial Line 04/25/18 Left Radial less than 1 day

## 2018-04-26 NOTE — SOCIAL WORK
CM met with the Pt at bedside to explain the CM role and discuss any potential D/C needs  Pt lives alone in a 2nd story home with 12STE  PTA IADL's, Pt does not use any DME  Pt drives and is employed  No hx of HH, IP rehab, MH diagnosis or D/A abuse  Pt has hx of OutPt PT  Pt's home pharmacy is [facility] in [place], [place]  No reported POA  Per PT/OT, Home with family support

## 2018-04-26 NOTE — PLAN OF CARE
Problem: OCCUPATIONAL THERAPY ADULT  Goal: Performs self-care activities at highest level of function for planned discharge setting  See evaluation for individualized goals  Treatment Interventions: ADL retraining, Functional transfer training, Patient/family training, Equipment evaluation/education, Visual perceptual retraining, Compensatory technique education, Continued evaluation, Activityengagement, Energy conservation          See flowsheet documentation for full assessment, interventions and recommendations  Limitation: Decreased ADL status, Decreased self-care trans, Decreased high-level ADLs, Decreased endurance (BALANCE, MEDICAL STATUS)  Prognosis: Good  Assessment: PT IS A 63 YO M ADMIT FOR IMAGE GUIDED ENDOSCOPIC TRANSSPHENOIDAL APPROACH FOR DEBULKING OF PITUITARY MACROADENOMA AND ABDOMINAL FAT GRAFT HARVESTING 4/25  PT EVALUATED BY OPTHALMOLOGY PRE OP FOR VISUAL DEFICITS DUE TO COMPRESSION OVERLYING OPTIC CHIASM  PT W/ ADD'L PMH SIGNIFICANT FOR GLAUCOMA  PT IS FROM HOME ALONE AND W/ BASELINE INDEPENDENCE IN ADLS/IADLS/DRIVING  NO DME USE AT BASELINE AND NO RECENT H/O FALLS  CURRENTLY PT COMPLETING UB ADLS W/ SBA AND REQUIRING MIN A LB ADLS  PT ADD'L COMPLETING FUNCTIONAL TRANSFERS W/ SBA AND REQUIRING CGA/MIN FOR FUNCTIONAL MOBILITY USING NO DME  PT REPORTS IMPROVEMENT IN VISION POST OPERATIVELY  PT APPEARS LIMITED AT THIS TIME 2* IMPAIRED BALANCE, ACTIVITY TOLERANCE, MILD PAIN AND ADL IMPAIRMENTS  AT THIS TIME, OT DOES ANTICIPATE RETURN HOME W/ INCREASED FAMILY SUPPORT  WILL CONTINUE TO FOLLOW PT 3-5X/WEEK IN ORDER TO MEET THE BELOW DESCRIBED GOALS IN 10-14 DAYS  OT Discharge Recommendation: Home with family support  OT - OK to Discharge:  (PENDING MEDICAL STABILITY   )

## 2018-04-27 LAB
ANION GAP SERPL CALCULATED.3IONS-SCNC: 7 MMOL/L (ref 4–13)
BASOPHILS # BLD AUTO: 0.01 THOUSANDS/ΜL (ref 0–0.1)
BASOPHILS NFR BLD AUTO: 0 % (ref 0–1)
BUN SERPL-MCNC: 16 MG/DL (ref 5–25)
CALCIUM SERPL-MCNC: 9.4 MG/DL (ref 8.3–10.1)
CHLORIDE SERPL-SCNC: 104 MMOL/L (ref 100–108)
CO2 SERPL-SCNC: 25 MMOL/L (ref 21–32)
CREAT SERPL-MCNC: 0.57 MG/DL (ref 0.6–1.3)
EOSINOPHIL # BLD AUTO: 0 THOUSAND/ΜL (ref 0–0.61)
EOSINOPHIL NFR BLD AUTO: 0 % (ref 0–6)
ERYTHROCYTE [DISTWIDTH] IN BLOOD BY AUTOMATED COUNT: 14.1 % (ref 11.6–15.1)
GFR SERPL CREATININE-BSD FRML MDRD: 109 ML/MIN/1.73SQ M
GLUCOSE SERPL-MCNC: 101 MG/DL (ref 65–140)
HCT VFR BLD AUTO: 42.1 % (ref 36.5–49.3)
HGB BLD-MCNC: 14.4 G/DL (ref 12–17)
LYMPHOCYTES # BLD AUTO: 1.36 THOUSANDS/ΜL (ref 0.6–4.47)
LYMPHOCYTES NFR BLD AUTO: 15 % (ref 14–44)
MCH RBC QN AUTO: 31.9 PG (ref 26.8–34.3)
MCHC RBC AUTO-ENTMCNC: 34.2 G/DL (ref 31.4–37.4)
MCV RBC AUTO: 93 FL (ref 82–98)
MONOCYTES # BLD AUTO: 0.47 THOUSAND/ΜL (ref 0.17–1.22)
MONOCYTES NFR BLD AUTO: 5 % (ref 4–12)
NEUTROPHILS # BLD AUTO: 7.42 THOUSANDS/ΜL (ref 1.85–7.62)
NEUTS SEG NFR BLD AUTO: 80 % (ref 43–75)
NRBC BLD AUTO-RTO: 0 /100 WBCS
PLATELET # BLD AUTO: 280 THOUSANDS/UL (ref 149–390)
PMV BLD AUTO: 9.7 FL (ref 8.9–12.7)
POTASSIUM SERPL-SCNC: 3.7 MMOL/L (ref 3.5–5.3)
RBC # BLD AUTO: 4.51 MILLION/UL (ref 3.88–5.62)
SODIUM SERPL-SCNC: 136 MMOL/L (ref 136–145)
WBC # BLD AUTO: 9.27 THOUSAND/UL (ref 4.31–10.16)

## 2018-04-27 PROCEDURE — 85025 COMPLETE CBC W/AUTO DIFF WBC: CPT | Performed by: PHYSICIAN ASSISTANT

## 2018-04-27 PROCEDURE — 80048 BASIC METABOLIC PNL TOTAL CA: CPT | Performed by: PHYSICIAN ASSISTANT

## 2018-04-27 RX ADMIN — HEPARIN SODIUM 5000 UNITS: 5000 INJECTION, SOLUTION INTRAVENOUS; SUBCUTANEOUS at 22:39

## 2018-04-27 RX ADMIN — ACETAMINOPHEN 650 MG: 325 TABLET, FILM COATED ORAL at 09:15

## 2018-04-27 RX ADMIN — SENNOSIDES 8.6 MG: 8.6 TABLET, FILM COATED ORAL at 22:37

## 2018-04-27 RX ADMIN — HEPARIN SODIUM 5000 UNITS: 5000 INJECTION, SOLUTION INTRAVENOUS; SUBCUTANEOUS at 14:02

## 2018-04-27 RX ADMIN — DOCUSATE SODIUM 100 MG: 100 CAPSULE, LIQUID FILLED ORAL at 09:14

## 2018-04-27 RX ADMIN — HEPARIN SODIUM 5000 UNITS: 5000 INJECTION, SOLUTION INTRAVENOUS; SUBCUTANEOUS at 06:04

## 2018-04-27 RX ADMIN — ONDANSETRON 4 MG: 2 INJECTION INTRAMUSCULAR; INTRAVENOUS at 09:15

## 2018-04-27 RX ADMIN — ACETAMINOPHEN 650 MG: 325 TABLET, FILM COATED ORAL at 01:40

## 2018-04-27 RX ADMIN — ACETAMINOPHEN 650 MG: 325 TABLET, FILM COATED ORAL at 22:36

## 2018-04-27 RX ADMIN — LATANOPROST 1 DROP: 50 SOLUTION OPHTHALMIC at 22:43

## 2018-04-27 RX ADMIN — DOCUSATE SODIUM 100 MG: 100 CAPSULE, LIQUID FILLED ORAL at 18:28

## 2018-04-27 RX ADMIN — ACETAMINOPHEN 650 MG: 325 TABLET, FILM COATED ORAL at 15:40

## 2018-04-27 NOTE — PLAN OF CARE
Discharge to home or other facility with appropriate resources Progressing      Discharge to post-acute care or home with appropriate resources Progressing      Absence or prevention of progression during hospitalization Progressing      Patient/family/caregiver demonstrates understanding of disease process, treatment plan, medications, and discharge instructions Progressing      Achieves stable or improved neurological status Progressing      Absence of seizures Progressing      Remains free of injury related to seizures activity Progressing      Achieves maximal functionality and self care Progressing      Verbalizes/displays adequate comfort level or baseline comfort level Progressing      Patient will remain free of falls Progressing      Skin integrity is maintained or improved Progressing      Maintain or return mobility status to optimal level Progressing

## 2018-04-27 NOTE — POST OP PROGRESS NOTES
Progress Note - Neurosurgery   Adal Luna 60 y o  male MRN: [id number]  Unit/Bed#: [facility] 913-01 Encounter: [id number]    Assessment:  1  POD2 s/p image guided endoscopic transsphenoidal approach to debulking of pituitary macroadenoma  2  Post-operative nausea and vomiting  3  headache  4  hypoganadotropic hypogonadism  5  Elevated IGF-1  6  History of glaucoma  7  Acromegaly  8  hypoprolactinemia    Plan:  · Neuro exam: GCS15, AAOx3, non-focal exam, minimal serosanguinous nasal drainage has resolved  · Continue Q1 neuro checks, call with exam change  · Post-op management  · Monitor for DI   · strict IOs- call if >250cc urine output/ hour  IOs not accurate because patient is refusing to use urinal and therefor unable to truly assess output  If elevated urine output will likely order urine osm, serum osm, specific gravity  · Na 138, continue to monitor for hypernatremia  · Urine osm 289  · Monitor for CSF leak  · Call if thin nasal drainage, positional headaches, PND with salty taste in mouth  · Serosanguinous fluid is likely inflammatory as opposed to CSF- no positional headaches  Drainage has slowed  · No straws, sneezing/coughing precautions  · Headache control- well controlled on tylenol  · Monitor for visual changes, no issues at current   · Follows with endo outpatient, does not take any supplements  Will need close OP follow-up  · Continue bowel regimen, avoid straining  · zofran and reglan PRN nausea, avoid retching- vomiting x1 today  Patient has not tolerated food since yesterday AM  Must tolerate PO diet without vomiting prior to discharge  · heparin SQ for DVT ppx in addition to SCDs and mobilization  · PT/OT cleared for d/c home pending clearing stairs  · Encourage PO intake    · Neurosurgery will continue to follow as primary team, call with questions or concerns  · Nurse provider rounds completed with Dee Dial RN    Subjective/Objective   Chief Complaint: " I just feel so worn out "    Subjective: Patient reports feeling worse than yesterday  Feels like he has no energy and " does not want to do anything "  Feels his headaches are well controlled with tylenol  Admits to nausea since yesterday after breakfast causing him inability to tolerate PO diet  Vomited x1 this morning  Denies dizziness or adverse effect to medication causing him to be nauseous  He is tolerating thin liquids without difficulty  Denies vision changes  Nasal drainage has resolved  Denies PND or salty taste in the back of his mouth  Denies positional headaches, excessive thirst, or excessive urination  No abdominal pain at area of abdominal fat graft    Objective: sitting up in bed, in NAD    I/O       04/25 0701 - 04/26 0700 04/26 0701 - 04/27 0700 04/27 0701 - 04/28 0700    P  O  555 460 120    I V  (mL/kg) 2598 3 (34 3)      IV Piggyback 650      Total Intake(mL/kg) 3803 3 (50 2) 460 (6) 120 (1 6)    Urine (mL/kg/hr) 1865 400 (0 2)     Stool  0 (0)     Blood 150      Total Output 2015 400      Net +1788 3 +60 +120           Unmeasured Urine Occurrence  4 x     Unmeasured Stool Occurrence  0 x           Invasive Devices     Peripheral Intravenous Line            Peripheral IV 04/25/18 Left Hand 1 day    Peripheral IV 04/25/18 Right Hand 1 day                Physical Exam:  Vitals: Blood pressure 132/86, pulse 61, temperature 98 2 °F (36 8 °C), temperature source Oral, resp  rate 18, height 5' 10" (1 778 m), weight 77 kg (169 lb 12 1 oz), SpO2 95 %  ,Body mass index is 24 36 kg/m²  General appearance: alert, appears stated age, cooperative and no distress  Head: Normocephalic, without obvious abnormality, atraumatic  Eyes: EOMI, PERRL, acuity intact in all fields  Nose: dried blood in nares, no active drainage from nares  Lungs: non labored breathing  Heart: regular heart rate  Abdomen: abdomen fat graft site is well approximated with steri strips    Neurologic:   Mental status: Alert, oriented, thought content appropriate  Cranial nerves: grossly intact (Cranial nerves II-XII)  Sensory: normal to light touch in UE and LE throughout  Motor: moving all extremities without focal weakness  5/5 b/l UE and LE  Reflexes: no clonus  Coordination: finger to nose normal bilaterally, no drift bilaterally      Lab Results:    Results from last 7 days  Lab Units 04/26/18  0434   WBC Thousand/uL 9 24   HEMOGLOBIN g/dL 13 4   HEMATOCRIT % 39 1   PLATELETS Thousands/uL 263   NEUTROS PCT % 83*   MONOS PCT % 5       Results from last 7 days  Lab Units 04/26/18  0434 04/25/18  1907   SODIUM mmol/L 138 137   POTASSIUM mmol/L 4 0 3 7   CHLORIDE mmol/L 106 104   CO2 mmol/L 23 24   BUN mg/dL 25 19   CREATININE mg/dL 0 64 0 55*   CALCIUM mg/dL 8 5 9 0   GLUCOSE RANDOM mg/dL 151* 108       Results from last 7 days  Lab Units 04/26/18  0434   MAGNESIUM mg/dL 1 9       Results from last 7 days  Lab Units 04/26/18  0434   PHOSPHORUS mg/dL 3 7       Results from last 7 days  Lab Units 04/26/18  0434 04/25/18  1907   INR  1 09  --    PTT seconds  --  32     Imaging Studies: I have personally reviewed pertinent reports  and I have personally reviewed pertinent films in PACS    EKG, Pathology, and Other Studies: I have personally reviewed pertinent reports        VTE Pharmacologic Prophylaxis: Heparin    VTE Mechanical Prophylaxis: sequential compression device and foot pump applied

## 2018-04-27 NOTE — PHYSICAL THERAPY NOTE
Physical Therapy Cancellation Note  Patient refused therapy today secondary to reports of significant headache  "My head is killing me, i'm not moving " Therapist instructed patient on importance of physical therapy with patient continuing to refuse  Will follow up as appropriate       Kate Franco PTA

## 2018-04-28 LAB
ANION GAP SERPL CALCULATED.3IONS-SCNC: 8 MMOL/L (ref 4–13)
BUN SERPL-MCNC: 16 MG/DL (ref 5–25)
CALCIUM SERPL-MCNC: 9.5 MG/DL (ref 8.3–10.1)
CHLORIDE SERPL-SCNC: 98 MMOL/L (ref 100–108)
CO2 SERPL-SCNC: 26 MMOL/L (ref 21–32)
CREAT SERPL-MCNC: 0.53 MG/DL (ref 0.6–1.3)
GFR SERPL CREATININE-BSD FRML MDRD: 113 ML/MIN/1.73SQ M
GLUCOSE SERPL-MCNC: 93 MG/DL (ref 65–140)
POTASSIUM SERPL-SCNC: 3.6 MMOL/L (ref 3.5–5.3)
SODIUM SERPL-SCNC: 132 MMOL/L (ref 136–145)

## 2018-04-28 PROCEDURE — 99024 POSTOP FOLLOW-UP VISIT: CPT | Performed by: PHYSICIAN ASSISTANT

## 2018-04-28 PROCEDURE — 80048 BASIC METABOLIC PNL TOTAL CA: CPT | Performed by: PHYSICIAN ASSISTANT

## 2018-04-28 RX ORDER — POLYETHYLENE GLYCOL 3350 17 G/17G
17 POWDER, FOR SOLUTION ORAL 2 TIMES DAILY PRN
Status: DISCONTINUED | OUTPATIENT
Start: 2018-04-28 | End: 2018-04-29 | Stop reason: HOSPADM

## 2018-04-28 RX ADMIN — ONDANSETRON 4 MG: 2 INJECTION INTRAMUSCULAR; INTRAVENOUS at 21:02

## 2018-04-28 RX ADMIN — ACETAMINOPHEN 325 MG: 325 TABLET, FILM COATED ORAL at 11:27

## 2018-04-28 RX ADMIN — HEPARIN SODIUM 5000 UNITS: 5000 INJECTION, SOLUTION INTRAVENOUS; SUBCUTANEOUS at 05:56

## 2018-04-28 RX ADMIN — LATANOPROST 1 DROP: 50 SOLUTION OPHTHALMIC at 20:51

## 2018-04-28 RX ADMIN — POLYETHYLENE GLYCOL 3350 17 G: 17 POWDER, FOR SOLUTION ORAL at 13:51

## 2018-04-28 RX ADMIN — ACETAMINOPHEN 650 MG: 325 TABLET, FILM COATED ORAL at 17:53

## 2018-04-28 RX ADMIN — ONDANSETRON 4 MG: 2 INJECTION INTRAMUSCULAR; INTRAVENOUS at 09:34

## 2018-04-28 RX ADMIN — MAGNESIUM HYDROXIDE 30 ML: 400 SUSPENSION ORAL at 16:03

## 2018-04-28 RX ADMIN — ONDANSETRON 4 MG: 2 INJECTION INTRAMUSCULAR; INTRAVENOUS at 02:37

## 2018-04-28 RX ADMIN — DOCUSATE SODIUM 100 MG: 100 CAPSULE, LIQUID FILLED ORAL at 09:34

## 2018-04-28 RX ADMIN — HEPARIN SODIUM 5000 UNITS: 5000 INJECTION, SOLUTION INTRAVENOUS; SUBCUTANEOUS at 13:51

## 2018-04-28 RX ADMIN — DOCUSATE SODIUM 100 MG: 100 CAPSULE, LIQUID FILLED ORAL at 17:41

## 2018-04-28 RX ADMIN — POLYETHYLENE GLYCOL 3350 17 G: 17 POWDER, FOR SOLUTION ORAL at 20:50

## 2018-04-28 RX ADMIN — SENNOSIDES 8.6 MG: 8.6 TABLET, FILM COATED ORAL at 20:51

## 2018-04-28 RX ADMIN — HEPARIN SODIUM 5000 UNITS: 5000 INJECTION, SOLUTION INTRAVENOUS; SUBCUTANEOUS at 20:52

## 2018-04-28 NOTE — PROGRESS NOTES
ENT    Some nausea and vomiting overnight  No vision issues  Persistent headache and fatigue  /78 (BP Location: Right arm)   Pulse 62   Temp 97 7 °F (36 5 °C) (Axillary)   Resp 18   Ht 5' 10" (1 778 m)   Wt 77 kg (169 lb 12 1 oz)   SpO2 99%   BMI 24 36 kg/m²   NAD  EOMI  No active nasal drainage    Na 132 from 136    A/P POD 3 s/p endoscopic transsphenoidal resection of pituitary macroadenoma  1  Doing well, no leak  2  DI watch  3   Pain/nausea control

## 2018-04-28 NOTE — PROGRESS NOTES
Progress Note - Neurosurgery   Chela Johnson 60 y o  male MRN: [id number]  Unit/Bed#: [facility] 913-01 Encounter: [id number]    Assessment:  1  POD #3  s/p image guided endoscopic transsphenoidal approach to debulking of pituitary macroadenoma  2  Post-operative nausea and vomiting  3  headache  4  hypoganadotropic hypogonadism  5  Elevated IGF-1  6  History of glaucoma  7  Acromegaly  8  hypoprolactinemia      Plan:  - does not feel he is ready for discharge today  - mobilize OOB  - pain control  - bowel program  - AM labs    Subjective/Objective     Head ache  Constipated  Does not "feel right"        Invasive Devices     Peripheral Intravenous Line            Peripheral IV 04/25/18 Right Hand 3 days    Peripheral IV 04/25/18 Left Hand 2 days                Physical Exam:     Vitals: Blood pressure 148/78, pulse 62, temperature 97 7 °F (36 5 °C), temperature source Axillary, resp  rate 18, height 5' 10" (1 778 m), weight 77 kg (169 lb 12 1 oz), SpO2 99 %  ,Body mass index is 24 36 kg/m²  Awake and alert  Moves extremities  Good strength  Minimal nasal drainage  Lungs clear   Facial features symmetrical  PERRLA  EOMI  Heart regular  Abdomen soft    Lab Results: I have personally reviewed pertinent results  Imaging Studies: I have personally reviewed pertinent reports          VTE Pharmacologic Prophylaxis: Heparin                                                                                                                                                                                                                                                                   VTE Mechanical Prophylaxis: sequential compression device

## 2018-04-28 NOTE — PROGRESS NOTES
Was called in pts room c/o nausea and vomiting, pt told me he has had episodes of dizziness and hot flashes during the night, made Neuro surgery aware, grabbed a set of vitals which we were stable will saigeue to monitor

## 2018-04-29 VITALS
SYSTOLIC BLOOD PRESSURE: 139 MMHG | HEART RATE: 56 BPM | HEIGHT: 70 IN | DIASTOLIC BLOOD PRESSURE: 92 MMHG | WEIGHT: 169.75 LBS | TEMPERATURE: 97.7 F | BODY MASS INDEX: 24.3 KG/M2 | OXYGEN SATURATION: 95 % | RESPIRATION RATE: 18 BRPM

## 2018-04-29 LAB
ANION GAP SERPL CALCULATED.3IONS-SCNC: 8 MMOL/L (ref 4–13)
BUN SERPL-MCNC: 22 MG/DL (ref 5–25)
CALCIUM SERPL-MCNC: 9.7 MG/DL (ref 8.3–10.1)
CHLORIDE SERPL-SCNC: 97 MMOL/L (ref 100–108)
CO2 SERPL-SCNC: 26 MMOL/L (ref 21–32)
CREAT SERPL-MCNC: 0.78 MG/DL (ref 0.6–1.3)
GFR SERPL CREATININE-BSD FRML MDRD: 96 ML/MIN/1.73SQ M
GLUCOSE SERPL-MCNC: 91 MG/DL (ref 65–140)
POTASSIUM SERPL-SCNC: 3.9 MMOL/L (ref 3.5–5.3)
SODIUM SERPL-SCNC: 131 MMOL/L (ref 136–145)

## 2018-04-29 PROCEDURE — 80048 BASIC METABOLIC PNL TOTAL CA: CPT | Performed by: PHYSICIAN ASSISTANT

## 2018-04-29 RX ORDER — BUTALBITAL, ACETAMINOPHEN AND CAFFEINE 50; 325; 40 MG/1; MG/1; MG/1
1 TABLET ORAL EVERY 4 HOURS PRN
Qty: 15 TABLET | Refills: 0 | Status: SHIPPED | OUTPATIENT
Start: 2018-04-29 | End: 2018-05-10

## 2018-04-29 RX ORDER — BISACODYL 10 MG
10 SUPPOSITORY, RECTAL RECTAL ONCE
Status: COMPLETED | OUTPATIENT
Start: 2018-04-29 | End: 2018-04-29

## 2018-04-29 RX ADMIN — ACETAMINOPHEN 650 MG: 325 TABLET, FILM COATED ORAL at 03:17

## 2018-04-29 RX ADMIN — HEPARIN SODIUM 5000 UNITS: 5000 INJECTION, SOLUTION INTRAVENOUS; SUBCUTANEOUS at 05:31

## 2018-04-29 RX ADMIN — POLYETHYLENE GLYCOL 3350 17 G: 17 POWDER, FOR SOLUTION ORAL at 10:41

## 2018-04-29 RX ADMIN — BISACODYL 10 MG: 10 SUPPOSITORY RECTAL at 04:23

## 2018-04-29 RX ADMIN — DOCUSATE SODIUM 100 MG: 100 CAPSULE, LIQUID FILLED ORAL at 10:00

## 2018-04-29 RX ADMIN — ACETAMINOPHEN 650 MG: 325 TABLET, FILM COATED ORAL at 10:41

## 2018-04-29 NOTE — PLAN OF CARE
DISCHARGE PLANNING     Discharge to home or other facility with appropriate resources Progressing        DISCHARGE PLANNING - CARE MANAGEMENT     Discharge to post-acute care or home with appropriate resources Progressing        INFECTION - ADULT     Absence or prevention of progression during hospitalization Progressing        Knowledge Deficit     Patient/family/caregiver demonstrates understanding of disease process, treatment plan, medications, and discharge instructions Progressing        NEUROSENSORY - ADULT     Achieves stable or improved neurological status Progressing     Absence of seizures Progressing     Remains free of injury related to seizures activity Progressing     Achieves maximal functionality and self care Progressing        PAIN - ADULT     Verbalizes/displays adequate comfort level or baseline comfort level Progressing        Potential for Falls     Patient will remain free of falls Progressing        Prexisting or High Potential for Compromised Skin Integrity     Skin integrity is maintained or improved Progressing        SAFETY ADULT     Maintain or return mobility status to optimal level Progressing

## 2018-04-29 NOTE — DISCHARGE SUMMARY
Discharge Summary   Chela Johnson [birth year] 60 y o  male   MRN: [id number]    [facility] Encounter: [id number]    Admission Date: 4/25/2018   Admitting Diagnosis: Hypogonadotropic hypogonadism ([address] ) [E23 0]  Pituitary macroadenoma ([address] ) [D35 2]  Elevated insulin-like growth factor 1 (IGF-1) level [R79 89]      Procedures Performed: Image guided endoscopic transsphenoidal approach for debulking of pituitary macroadenoma, abdominal fat graft harvesting (N/A)  Image guided endoscopic transnasal approach for transsphenoidal surgery (N/A)         Surgical Pathology:  pending       Order Name Source Comment Collection Info Order Time   [address], Arterial  Collected By: Tommy Medina RN 4/25/2018  8:30 PM   APTT    4/26/2018  4:00 AM   TYPE AND SCREEN Arm, Right  Collected By: Malena Bynum 4/25/2018 12:03 PM    Has the patient been transfused in the last 3 months? No       Medical or Pre-op  PreOp       Date of Surgery  4/25/2018       Where is the Surgery Scheduled? [facility]  Collected By: Michel Hilliard MD 4/25/2018  2:21 PM       Hospital Course:  60year old male with hypogonadism and loss of peripheral vision found to have a pituitary macroadenoma  Following careful review with Dr Aniya Borja he underwent transphenoidal debulking of the mass  There were no complications during the hospital stay  Urine output was normal  He did have some constipation which was relieved with laxative and suppository  Preliminary pathology was consistent  with adenoma  Follow up in the office as scheduled  Patient Active Problem List   Diagnosis    Hypogonadism    Pituitary macroadenoma (HCC)    Elevated insulin-like growth factor 1 (IGF-1) level    Pituitary mass ([address] )    Acromegaly ([address] )    Hyperprolactinemia ([address] )    Hypogonadism male       Discharge Diagnosis:   1   POD #4  s/p image guided endoscopic transsphenoidal approach to debulking of pituitary macroadenoma  2  Post-operative nausea and vomiting  3  headache  4  hypoganadotropic hypogonadism  5  Elevated IGF-1  6  History of glaucoma  7  Acromegaly  8  hypoprolactinemia      Condition at Discharge: good     Discharge instructions/Information to patient and family:   See after visit summary for information provided to patient and family  Provisions for Follow-Up Care:  See after visit summary for information related to follow-up care and any pertinent home health orders  Disposition: Home    Discharge Statement   I spent 30 minutes discharging the patient  This time was spent on the day of discharge  I had direct contact with the patient on the day of discharge  Additional documentation is required if more than 30 minutes were spent on discharge  Discharge Medications:  See after visit summary for reconciled discharge medications provided to patient and family

## 2018-04-29 NOTE — PROGRESS NOTES
Progress Note - Neurosurgery   Giles Bey 60 y o  male MRN: [id number]  Unit/Bed#: [facility] 913-01 Encounter: [id number]    Assessment:  1  POD #4  s/p image guided endoscopic transsphenoidal approach to debulking of pituitary macroadenoma  2  Post-operative nausea and vomiting  3  headache  4  hypoganadotropic hypogonadism  5  Elevated IGF-1  6  History of glaucoma  7  Acromegaly  8  hypoprolactinemia      Plan:  - + BM  - ready for discharge  - follow up as scheduled    Subjective/Objective     Feels better        Invasive Devices     Peripheral Intravenous Line            Peripheral IV 04/25/18 Left Hand 3 days    Peripheral IV 04/25/18 Right Hand 3 days                Physical Exam:     Vitals: Blood pressure 139/92, pulse 56, temperature 97 7 °F (36 5 °C), temperature source Axillary, resp  rate 18, height 5' 10" (1 778 m), weight 77 kg (169 lb 12 1 oz), SpO2 95 %  ,Body mass index is 24 36 kg/m²  Awake and alert  Moves all extremities  PERRLA/EOMI  Facial features symmetric  Tongue midline  Minimal nasal drainage  Lungs clear  Heart regular  Abdomen soft    Lab Results: I have personally reviewed pertinent results  Imaging Studies: I have personally reviewed pertinent reports          VTE Pharmacologic Prophylaxis: Heparin    VTE Mechanical Prophylaxis: sequential compression device

## 2018-04-29 NOTE — PLAN OF CARE
DISCHARGE PLANNING     Discharge to home or other facility with appropriate resources Adequate for Discharge        DISCHARGE PLANNING - CARE MANAGEMENT     Discharge to post-acute care or home with appropriate resources Adequate for Discharge        INFECTION - ADULT     Absence or prevention of progression during hospitalization Adequate for Discharge        Knowledge Deficit     Patient/family/caregiver demonstrates understanding of disease process, treatment plan, medications, and discharge instructions Adequate for Discharge        NEUROSENSORY - ADULT     Achieves stable or improved neurological status Adequate for Discharge     Absence of seizures Adequate for Discharge     Remains free of injury related to seizures activity Adequate for Discharge     Achieves maximal functionality and self care Adequate for Discharge        PAIN - ADULT     Verbalizes/displays adequate comfort level or baseline comfort level Adequate for Discharge        Potential for Falls     Patient will remain free of falls Adequate for Discharge        Prexisting or High Potential for Compromised Skin Integrity     Skin integrity is maintained or improved Adequate for Discharge        SAFETY ADULT     Maintain or return mobility status to optimal level Adequate for Discharge

## 2018-04-29 NOTE — PROGRESS NOTES
Pt was given Biscodyl suppository  Pt had medium bowel movement within an hour  Pt reports hot flashes are absent

## 2018-05-02 ENCOUNTER — TELEPHONE (OUTPATIENT)
Dept: NEUROSURGERY | Facility: CLINIC | Age: 63
End: 2018-05-02

## 2018-05-02 NOTE — TELEPHONE ENCOUNTER
Spoke with Lilian Pelayo to see how he is doing after surgery on 4/25/18  He reports that he is doing very well overall and denies any incisional issues or fevers  Patient denies any headaches, drainage from his nares, dizziness, significant pain and states that he is doing well overall  States that he spit up a small amount of blood the other day but nothing since  Verified date/time/location of his upcoming POV on 5/10/2018 and advised him to call the office with any further questions or concerns, or if any incisional issues or fevers would arise  Went over s/s infection and CSF leak with patient and he has no further questions at this time  Patient was appreciative for the call

## 2018-05-10 ENCOUNTER — OFFICE VISIT (OUTPATIENT)
Dept: NEUROSURGERY | Facility: CLINIC | Age: 63
End: 2018-05-10

## 2018-05-10 ENCOUNTER — TRANSCRIBE ORDERS (OUTPATIENT)
Dept: NEUROSURGERY | Facility: CLINIC | Age: 63
End: 2018-05-10

## 2018-05-10 VITALS
DIASTOLIC BLOOD PRESSURE: 65 MMHG | RESPIRATION RATE: 16 BRPM | SYSTOLIC BLOOD PRESSURE: 122 MMHG | HEART RATE: 54 BPM | HEIGHT: 70 IN | TEMPERATURE: 97.3 F | BODY MASS INDEX: 23.85 KG/M2 | WEIGHT: 166.6 LBS

## 2018-05-10 DIAGNOSIS — D35.2 PITUITARY MACROADENOMA (HCC): ICD-10-CM

## 2018-05-10 DIAGNOSIS — Z01.818 PRE-PROCEDURAL EXAMINATION: Primary | ICD-10-CM

## 2018-05-10 DIAGNOSIS — E22.0 ACROMEGALY (HCC): ICD-10-CM

## 2018-05-10 DIAGNOSIS — R79.89 ELEVATED INSULIN-LIKE GROWTH FACTOR 1 (IGF-1) LEVEL: ICD-10-CM

## 2018-05-10 DIAGNOSIS — Z09 POSTOPERATIVE EXAMINATION: Primary | ICD-10-CM

## 2018-05-10 PROCEDURE — 99024 POSTOP FOLLOW-UP VISIT: CPT | Performed by: NEUROLOGICAL SURGERY

## 2018-05-10 NOTE — PROGRESS NOTES
Neurosurgery Office Note  Demetrius Cervantes is a 60 y o  male    Type of Visit: Post-op      ASSESSMENT / PLAN  Diagnoses and all orders for this visit:    Postoperative examination  -     Insulin-like growth factor; Future  -     Growth hormone; Future  -     MRI brain pituitary wo and w contrast; Future  -     Insulin-like growth factor  -     Growth hormone    Acromegaly (HCC)  -     Insulin-like growth factor; Future  -     Growth hormone; Future  -     MRI brain pituitary wo and w contrast; Future  -     Insulin-like growth factor  -     Growth hormone    Pituitary macroadenoma (HCC)  -     Insulin-like growth factor; Future  -     Growth hormone; Future  -     MRI brain pituitary wo and w contrast; Future  -     Insulin-like growth factor  -     Growth hormone    Elevated insulin-like growth factor 1 (IGF-1) level  -     Insulin-like growth factor; Future  -     Growth hormone; Future  -     MRI brain pituitary wo and w contrast; Future  -     Insulin-like growth factor  -     Growth hormone    Other orders  -     Cyanocobalamin (VITAMIN B 12 PO); Take 1 tablet by mouth daily  -     Multiple Vitamins-Minerals (CENTRUM ADULTS PO); Take 1 tablet by mouth daily          DISCUSSION SUMMARY  27-year-old gentleman status post a endoscopic transsphenoidal surgery who is doing very well  He is asymptomatic  He is back to his normal activities  Imaging studies after 3 months with reassessment of laboratory values at that time will be very important in projecting his future treatment strategies        CHIEF COMPLAINT  Patient presents for 2 week Path discussion s/p Image guided endoscopic transsphenoidal approach for debulking of pituitary macroadenoma, abdominal fat graft harvesting on 4/25/18    Diagnoses:   Pituitary adenoma dominated by somatotroph elements with minor lactotroph component, Ki-67 labeling index < 1%, without overexpression of p53    SX INFO  - 4/25/18 (DKO) Image guided endoscopic transsphenoidal approach for debulking of pituitary macroadenoma, abdominal fat graft harvesting      HISTORY OF PRESENT ILLNESS  He has no headaches  He has no drainage from his nose  He is back to normal activities  REVIEW OF SYSTEMS  Review of Systems   Constitutional: Negative  HENT: Negative  Eyes: Negative  Respiratory: Negative  Cardiovascular: Negative  Gastrointestinal: Negative  Endocrine: Negative  Genitourinary: Negative  Musculoskeletal: Negative  Skin: Negative  Allergic/Immunologic: Negative  Neurological: Negative  Hematological: Negative  Psychiatric/Behavioral: Negative  All other systems reviewed and are negative        The patient's ROS was reviewed by MD   Reviewed  Active Ambulatory Problems     Diagnosis Date Noted    Hypogonadism 03/14/2018    Pituitary macroadenoma ([facility] ) 03/14/2018    Elevated insulin-like growth factor 1 (IGF-1) level 03/14/2018    Pituitary mass ([facility] ) 04/25/2018    Acromegaly ([facility] ) 12/12/2017    Hyperprolactinemia ([facility] ) 10/31/2017    Hypogonadism male 10/24/2017     Resolved Ambulatory Problems     Diagnosis Date Noted    No Resolved Ambulatory Problems     Past Medical History:   Diagnosis Date    Decreased stamina     Feeling tired     Glaucoma     Lipoma     Lipoma of left shoulder     Muscle weakness     Open-angle glaucoma     Pituitary mass ([facility] )        Past Surgical History:   Procedure Laterality Date    ANKLE SURGERY      lipoma    FINGER TENDON REPAIR      HAND SURGERY      KNEE ARTHROSCOPY      FL NEUROENDOSCOP,EXC,PIT MAKI,TRANSNAS/SPHEN N/A 4/25/2018    Procedure: Image guided endoscopic transsphenoidal approach for debulking of pituitary macroadenoma, abdominal fat graft harvesting;  Surgeon: Myron Salter MD;  Location: [facility];  Service: Neurosurgery    SHOULDER SURGERY      lipoma removal       History   Smoking Status    Former Smoker    Types: Cigarettes    Quit date: 1997   Smokeless Tobacco  Never Used     Comment: quit 15 years ago       History   Alcohol Use    Yes     Comment: occasional       History   Drug Use No       Vitals:    05/10/18 1522   BP: 122/65   BP Location: Left arm   Patient Position: Sitting   Cuff Size: Adult   Pulse: (!) 54   Resp: 16   Temp: (!) 97 3 °F (36 3 °C)   TempSrc: Tympanic   Weight: 75 6 kg (166 lb 9 6 oz)   Height: 5' 10" (1 778 m)         Current Outpatient Prescriptions:     cholecalciferol (VITAMIN D3) 1,000 units tablet, Take by mouth daily  , Disp: , Rfl:     Cyanocobalamin (VITAMIN B 12 PO), Take 1 tablet by mouth daily, Disp: , Rfl:     latanoprost (XALATAN) 0 005 % ophthalmic solution, 1 drop daily at bedtime, Disp: , Rfl:     Multiple Vitamins-Minerals (CENTRUM ADULTS PO), Take 1 tablet by mouth daily, Disp: , Rfl:     The following portions of the patient's history were reviewed by MD and updated by MA as appropriate: allergies, current medications, past family history, past medical history, past social history, past surgical history and problem list       Physical Exam  Awake alert and oriented  Extraocular movements are intact  Facial expression is intact in grimace and at rest  He has no drainage from his nose  This power is 5/5 bilaterally    RESULTS/DATA

## 2018-07-02 ENCOUNTER — DOCUMENTATION (OUTPATIENT)
Dept: NEUROSURGERY | Facility: CLINIC | Age: 63
End: 2018-07-02

## 2018-07-02 NOTE — PROGRESS NOTES
ADRENOCORTICOTROPHIN     Ref Range & Units: 7 2 - 63 3 pg/mL (ACTH reference interval for samples collected Between 7 and 10 AM)                                                ALPHA SUBUNIT, FREE      Ref Range:  Males: < or =0 5 ng/mL    Premenopausal females: < or =1 2 ng/mL    Postmenopausal females: < or =1 8 ng/mL                                                BASIC METABOLIC PANEL  Component      Latest Ref Rng & Units   4/19/18 4/25/18 4/26/18 4/27/18 4/28/18 4/29/18         Sodium      136 - 145 mmol/L  137 138 136 132(L) 131(L)     Potassium      3 5 - 5 3 mmol/L  3 7 4 0 3 7 3 6 3 9     Chloride      100 - 108 mmol/L  104 106 104 98(L) 97(L)     CO2      21 - 32 mmol/L  24 23 25 26 26     Anion Gap      4 - 13 mmol/L  9 9 7 8 8     BUN      5 - 25 mg/dL  19 25 16 16 22     Creatinine      0 60 - 1 30 mg/dL 0 73(L) 0 55(L) 0 64 0 57(L) 0 53(L) 0 78     GLUCOSE FASTING      65 - 99 mg/dL  108 151(H) 101 93 91     Calcium      8 3 - 10 1 mg/dL  9 0 8 5 9 4 9 5 9 7     eGFR      ml/min/1 73sq m  111 104 109 113 96       CALCIUM IONIZED       Ref Range & Units: 1 12 - 1 32 mmol/L                                                CORTISOL AM SPECIMEN     Ref Range & Units: 4 2 - 22 4 ug/mL  2/23/18                                8 7              C-RECTIVE PROTEIN      Ref Range & Units: <3 0 mg/L                                                FSH         Ref Range & Units: 0 7 - 10 8 mIU/mL   Result Comment:  Adult Female:                                         Follicular phase      3 5 - 12 5                                        Ovulation phase     4 7 - 21 5                                        Luteal phase           1 7 -   7 7                                        Postmenopausal    25 8 - 134 8  10/26/17                                2 8              GROWTH HORMONE      Ref Range & Units: 0 0 - 10 0 ng/mL                                                IGF-1         Ref Range & Units: 42 - 169 ng/mL                                                LH (LEUTINIZING HORMONE)     Ref Range & Units: 1 2 - 10 6 mIU/mL   Result Comment:  Adult Female: Follicular phase      2 4 - 12 6                                        Ovulation phase     14 0 - 95 6                                         Luteal phase            1 0 - 11 4                                         Postmenopausal     7 7 - 58 5                                                PROLACTIN        Ref Range & Units: 2 5 - 17 4 ng/mL    Nongravid/nonlactatin-29 ng/mL   Gravid:  ng/mL (third trimester)  The reference range for prolactin in males is 2-18 ng/mL  T4, FREE        Ref Range & Units: 0 76 - 1 46 ng/dL    10/26/17                              1 03              TESTOSTERONE, FREE      Ref Range & Units: 6 6 - 18 1 pg/mL    10/26/17                              5 4(L)              TESTOSTERONE TOTAL      Ref Range & Units: 264 - 916 ng/dL    10/26/17                              181(L)              TSH 3RD GENERATON      Ref Range & Units: 0 358 - 3 740 uIU/mL                                                  HEMOGLOBIN A1C      Ref Range & Units: 4 2 - 6 3 %    18                              5 6              EST  AVG  GLUCOSE      Ref Range & Units:  For basic reference the interval in adults/children is  ng/L     Blood glucose levels are measured in mg/dL  Hypoglycemia is considered when glucose value is less than 70mg/dL     The normal glucose range is  mg/dL     Prediabetes is 100-126 mg/dL     Diabetes after a fasting study is greater than 126mg/dL     Diabetes are a nonfasting study is greater than 200mg/dL      18                              114

## 2018-08-02 ENCOUNTER — DOCUMENTATION (OUTPATIENT)
Dept: NEUROSURGERY | Facility: CLINIC | Age: 63
End: 2018-08-02

## 2018-08-02 NOTE — PROGRESS NOTES
I called pt ginna to see when he is scheduled for his mri he was arranging - advised we need a few days for garo marroquin

## 2018-08-06 ENCOUNTER — TELEPHONE (OUTPATIENT)
Dept: NEUROSURGERY | Facility: CLINIC | Age: 63
End: 2018-08-06

## 2018-08-06 NOTE — TELEPHONE ENCOUNTER
-     Insulin-like growth factor; Future  -     Growth hormone; Future  -     MRI brain pituitary wo and w contrast; Future  -     Insulin-like growth factor  -     Growth hormone    8/6/18 - CALLED PATIENT AND REMINDED HIM ABOUT GETTING LABS DONE  MRI IS BEING DONE [facility]     8/6/18 - PATIENT CALLED BACK AND STATED THAT THIS APPOINTMENT WOULD NOT WORK  HE NEEDS LAB SLIP AND ALSO MRI SCRIPT TO SCHEDULE AT [facility]  HE STATED THAT HIS LABS HE WILL HAVE COMPLETED AT [facility]  HE ASKED THAT I MAIL HIM THE SCRIPTS AND ONCE HE HAS THE MRI SCHEDULED HE WILL CALL ME TO RESCHEDULE HIS APPOINTMENT WITH OUR OFFICE  SCRIPTS WERE PRINTED AND MAILED TO PATIENT

## 2018-08-08 ENCOUNTER — TELEPHONE (OUTPATIENT)
Dept: ENDOCRINOLOGY | Facility: HOSPITAL | Age: 63
End: 2018-08-08

## 2018-08-08 DIAGNOSIS — Z98.890 HISTORY OF PITUITARY SURGERY: ICD-10-CM

## 2018-08-08 DIAGNOSIS — E22.0 ACROMEGALY (HCC): Primary | ICD-10-CM

## 2018-09-12 LAB
ALBUMIN SERPL-MCNC: 4.2 G/DL (ref 3.6–4.8)
ALBUMIN/GLOB SERPL: 2 {RATIO} (ref 1.2–2.2)
ALP SERPL-CCNC: 61 IU/L (ref 39–117)
ALT SERPL-CCNC: 33 IU/L (ref 0–44)
AST SERPL-CCNC: 29 IU/L (ref 0–40)
BILIRUB SERPL-MCNC: 0.5 MG/DL (ref 0–1.2)
BUN SERPL-MCNC: 15 MG/DL (ref 8–27)
BUN SERPL-MCNC: 15 MG/DL (ref 8–27)
BUN/CREAT SERPL: 21 (ref 10–24)
CALCIUM SERPL-MCNC: 9.7 MG/DL (ref 8.6–10.2)
CHLORIDE SERPL-SCNC: 101 MMOL/L (ref 96–106)
CO2 SERPL-SCNC: 24 MMOL/L (ref 20–29)
CORTIS AM PEAK SERPL-MCNC: 12.4 UG/DL (ref 6.2–19.4)
CREAT SERPL-MCNC: 0.71 MG/DL (ref 0.76–1.27)
CREAT SERPL-MCNC: 0.73 MG/DL (ref 0.76–1.27)
FSH SERPL-ACNC: 3 MIU/ML (ref 1.5–12.4)
GH SERPL-MCNC: 8.8 NG/ML (ref 0–10)
GH SERPL-MCNC: 8.9 NG/ML (ref 0–10)
GLOBULIN SER-MCNC: 2.1 G/DL (ref 1.5–4.5)
GLUCOSE SERPL-MCNC: 95 MG/DL (ref 65–99)
IGF-I SERPL-MCNC: 549 NG/ML (ref 49–188)
IGF-I SERPL-MCNC: 578 NG/ML (ref 49–188)
LH SERPL-ACNC: 2.6 MIU/ML (ref 1.7–8.6)
POTASSIUM SERPL-SCNC: 4.4 MMOL/L (ref 3.5–5.2)
PROLACTIN SERPL-MCNC: 14.6 NG/ML (ref 4–15.2)
PROT SERPL-MCNC: 6.3 G/DL (ref 6–8.5)
SL AMB EGFR AFRICAN AMERICAN: 113 ML/MIN/1.73
SL AMB EGFR AFRICAN AMERICAN: 115 ML/MIN/1.73
SL AMB EGFR NON AFRICAN AMERICAN: 98 ML/MIN/1.73
SL AMB EGFR NON AFRICAN AMERICAN: 99 ML/MIN/1.73
SODIUM SERPL-SCNC: 141 MMOL/L (ref 134–144)
T4 FREE SERPL-MCNC: 1.12 NG/DL (ref 0.82–1.77)
TESTOST FREE SERPL-MCNC: 5.3 PG/ML (ref 6.6–18.1)
TESTOST SERPL-MCNC: 263 NG/DL (ref 264–916)
TSH SERPL DL<=0.005 MIU/L-ACNC: 2.94 UIU/ML (ref 0.45–4.5)

## 2018-09-14 ENCOUNTER — TELEPHONE (OUTPATIENT)
Dept: NEUROSURGERY | Facility: CLINIC | Age: 63
End: 2018-09-14

## 2018-09-14 NOTE — TELEPHONE ENCOUNTER
Acute Otitis Media with Infection (Infant/Toddler)  The middle ear is the space behind the eardrum. The eustachian tubes connect the ears to the nasal passage. They help drain normal fluids and equalize pressure in the ear. The tubes are shorter and more horizontal in children, so they are more likely to become blocked. As a result of a blockage, fluid and pressure build up in the middle ear. If bacteria or fungi grow in the fluid, an ear infection results. This is called acute otitis media. It is more commonly known as an earache.  Symptoms of an earache include fussiness, increased crying, pulling at the ear, or shaking the head. If the child can talk, he or she may complain of ear pain. Your child may have a respiratory infection first, before the ear infection.  After an ear infection is treated and has cleared, the middle ear may still contain fluid buildup. This fluid may take weeks or months to go away. During that time, your child may have temporary reduced hearing. But all other symptoms of the earache should be gone.  Home care  Follow these guidelines when caring for your child at home:   · The doctor will likely prescribe medications for pain, such as acetaminophen. The doctor may also prescribe medications for infection (antibiotics or antifungals). Because ear infections can clear up on their own, the doctor may suggest a waiting period of a few days before giving the child medications for infection. Medications may be in liquid form to give orally or as eardrops. Follow the doctor’s instructions for using medications.  · To reduce pain, have your child rest in an upright position. Use hot or cold compresses.  · Keep the ear dry. Have your child wear a shower cap when bathing.  · Avoid smoking near your child. Smoking has been shown to increase the incidence of ear infections in children.  To apply eardrops:  1. If the eardrop medication is refrigerated, put the bottle in warm water before using.  Called patient to notify that we received his lab results and Dr Jannet Kendall reviewed  Dr Jannet Kendall is recommending patient sees an Endocrinologist if not already established  I advised patient of this and asked for call back to help him if needed  Cold drops in the ear are uncomfortable.  2. Have your child lie down on a flat surface. Gently hold the head to one side. Remove any drainage from the ear with a clean tissue or cotton swab. Clean only the outer ear. Do not insert the swab into the ear canal.  3. Straighten the ear canal: Pull the earlobe down and back.  4. Keep the dropper ½ inch above the ear canal to avoid contamination. Apply the drops against the side of the ear canal.  5. Have your child stay lying down for 2 to 3 minutes. This gives time for the medication to enter the ear canal. If your child does not have pain, gently massage the outer ear near the opening.Wipe away excess medication from the outer ear with a clean cotton ball.  Follow-up care  Follow up as advised by the doctor or our staff.  Special note to parents  If your child continues to get earaches, your child’s doctor may talk to you about inserting small tubes in the child’s eardrum to help prevent fluid buildup. This is a simple and effective surgical procedure.  When to seek medical advice  Call your child's health care provider right away if:  · Your child is 3 months old or younger and has a fever of 100.4°F (38°C) or higher. Your child may need to see a health care provider.  · Your child is of any age and has fevers higher than 104°F (40°C) that come back again and again  Also call your child's provider right away if any of these occur:  · New symptoms, especially swelling around the ear or weakness of face muscles  · Severe pain  · Infection that seems to get worse, not better  © 0474-7642 [employer]. [address] [place] [address], [place] [address]. All rights reserved. This information is not intended as a substitute for professional medical care. Always follow your healthcare professional's instructions.        Bronchiolitis (RSV Infection) (Child)  The lungs have many small breathing tubes. These tubes are called bronchioles. If the lining of these tubes get  inflamed and swollen, the condition is called bronchiolitis. It occurs most often in children up to age 2.  Bronchiolitis often occurs in the winter. It starts with a cold. Your child may first have a runny nose, mild cough, fever, and a cough with mucus. After a few days, the cough may get worse. Your child will start to breathe faster, wheeze, and grunt. Wheezing is a whistling sound caused by breathing through narrowed airways. In severe cases, breathing can stop for short periods.  Bronchiolitis is treated by helping your child’s breathing. The healthcare provider may suction mucus from your child’s nose and mouth. He or she may give medicines for a cough or fever. Children who have trouble breathing or eating may need to stay in the hospital for 1 or more nights. They may receive intravenous (IV) fluids, oxygen, or asthma medicine with a breathing machine. Symptoms usually get better in 2 to 5 days. But they may last for weeks. In some cases, your child may need an antiviral medicine. This is to help prevent the condition from coming back. Antibiotic treatment is usually not required for this illness, unless it is complicated by a bacterial infection such as pneumonia or an ear infection.  Babies under 12 weeks of age or children with a chronic illness are at higher risk for severe bronchiolitis. Complications can include dehydration and a lung infection called pneumonia. A child who has bronchiolitis is more likely to have bouts of wheezing when he or she is older.  Home care  Follow these guidelines when caring for your child at home:  · Your child’s healthcare provider may prescribe medicines to treat wheezing. Follow all instructions for giving these medicines to your child.  · Use children’s acetaminophen for fever, fussiness, or discomfort, unless another medicine was prescribed. In infants over 6 months of age, you may use children’s ibuprofen or acetaminophen. (Note: If your child has chronic liver or  kidney disease or has ever had a stomach ulcer or gastrointestinal bleeding, talk with your healthcare provider before using these medicines.) Aspirin should never be given to anyone younger than 18 years of age who is ill with a viral infection or fever. It may cause severe liver or brain damage.  · Wash your hands well with soap and warm water before and after caring for your child. This is to help prevent spreading infection.  · Give your child plenty of time to rest. Have your child sleep in a slightly upright position. This is to help make breathing easier. If possible, raise the head of the bed a few inches. Or prop your child’s body up with pillows.  · Make sure your older child blows his or her nose effectively. Your child’s healthcare provider may recommend saline nose drops to help thin and remove nasal secretions. Saline nose drops are available without a prescription. You can also use 1/4 teaspoon of table salt mixed well in 1 cup of water. You may put 2 to 3 drops of saline drops in each nostril before having your child blow his or her nose. Always wash your hands after touching used tissues.  ·  For younger children, suction mucus from the nose with saline nose drops and a small bulb syringe. Talk with your child’s healthcare provider or pharmacist if you don’t know how to use a bulb syringe. Always wash your hands after using a bulb syringe or touching used tissues.  · To prevent dehydration and help loosen lung secretions in toddlers and older children, make sure your child drinks plenty of liquids. Children may prefer cold drinks, frozen desserts, or ice pops. They may also like warm soup or drinks with lemon and honey. Don’t give honey to a child younger than 1 year old.  · To prevent dehydration and help loosen lung secretions in infants under 1 year old, make sure your child drinks plenty of liquids. Use a medicine dropper, if needed, to give small amounts of breast milk, formula, or oral  rehydration solution to your baby. Give 1 to 2 teaspoons every 10 to 15 minutes. A baby may only be able to feed for short amounts of time. If you are breastfeeding, pump and store milk for later use. Give your child oral rehydration solution between feedings. This is available from grocery stores and drugstores without a prescription.  · To make breathing easier during sleep, use a cool-mist humidifier in your child’s bedroom. Clean and dry the humidifier daily to prevent bacteria and mold growth. Don’t use a hot-water vaporizer. It can cause burns. Your child may also feel more comfortable sitting in a steamy bathroom for up to 10 minutes.  · Over-the-counter cough and cold medicine has not been proven to be any more helpful than a placebo (syrup with no medicine in it). In addition, these medicines can produce serious side effects, especially in infants under 2 years of age. Do not give over-the-counter cough and cold medicines to children under 6 years unless your healthcare provider has specifically advised you to do so.  · Don’t expose your child to cigarette smoke. Tobacco smoke can make your child’s symptoms worse.  Follow-up care  Follow up with your healthcare provider, or as advised.  (Note: If your child had an X-ray, it will be reviewed by a specialist. You will be notified of any new findings that may affect your child's care.)  When to seek medical advice  For a usually healthy child, call your child's healthcare provider right away if any of these occur:  · Your child is 3 months old or younger and has a fever of 100.4°F (38°C) or higher. Get medical care right away. Fever in a young baby can be a sign of a dangerous infection.  · Your child is of any age and has repeated fevers above 104°F (40°C).  · Your child is younger than 2 years of age and a fever of 100.4°F (38°C) continues for more than 1 day.  · Your child is 2 years old or older and a fever of 100.4°F (38°C) continues for more than 3  days.  · Symptoms don’t get better, or get worse.  · Breathing difficulty doesn’t get better.  · Your child loses his or her appetite or feeds poorly.  · Your child has an earache, sinus pain, a stiff or painful neck, headache, repeated diarrhea, or vomiting.  · A new rash appears.  Call 911, or get immediate medical care   Contact emergency services if any of these occur:  · Increasing trouble breathing  · Fast breathing, as follows:  ¨ Birth to 6 weeks: over 60 breaths per minute.  ¨ 6 weeks to 2 years: over 45 breaths per minute.  ¨ 3 to 6 years: over 35 breaths per minute.  ¨ 7 to 10 years: over 30 breaths per minute.  ¨ Older than 10 years: over 25 breaths per minute.  · Blue tint to the lips or fingernails  · Signs of dehydration, such as dry mouth, crying with no tears, or urinating less than normal; no wet diapers for 8 hours in infants  · Unusual fussiness, drowsiness, or confusion  © 5652-9070 [employer]. [address] [place] [address], [place] [address]. All rights reserved. This information is not intended as a substitute for professional medical care. Always follow your healthcare professional's instructions.

## 2018-09-18 ENCOUNTER — OFFICE VISIT (OUTPATIENT)
Dept: ENDOCRINOLOGY | Facility: HOSPITAL | Age: 63
End: 2018-09-18
Payer: COMMERCIAL

## 2018-09-18 VITALS
SYSTOLIC BLOOD PRESSURE: 120 MMHG | DIASTOLIC BLOOD PRESSURE: 70 MMHG | BODY MASS INDEX: 23.88 KG/M2 | WEIGHT: 166.4 LBS | HEART RATE: 80 BPM

## 2018-09-18 DIAGNOSIS — E22.0 ACROMEGALY (HCC): ICD-10-CM

## 2018-09-18 DIAGNOSIS — E23.0 HYPOGONADOTROPIC HYPOGONADISM (HCC): Primary | ICD-10-CM

## 2018-09-18 PROCEDURE — 99214 OFFICE O/P EST MOD 30 MIN: CPT | Performed by: INTERNAL MEDICINE

## 2018-09-18 NOTE — LETTER
September 18, 2018     [address]  [address]     Patient: Codi Jeff   YOB: 1955   Date of Visit: 9/18/2018       Dear Dr Centeno Branch: Thank you for referring Feli Oconnor to me for evaluation  Below are my notes for this consultation  If you have questions, please do not hesitate to call me  I look forward to following your patient along with you  Sincerely,        Ephraim Nathan DO        CC: No Recipients  Ephraim Nathan DO  9/18/2018  3:27 PM  Sign at close encounter  9/18/2018    Assessment/Plan      Diagnoses and all orders for this visit:    Hypogonadotropic hypogonadism ([address] )  -     Comprehensive metabolic panel Lab Collect; Future  -     Lipid Panel with Direct LDL reflex Lab Collect; Future  -     Testosterone, free, total Lab Collect; Future  -     Insulin-like growth factor 1 (IGF-1) - Lab Collect; Future  -     Growth hormone; Future  -     Comprehensive metabolic panel Lab Collect  -     Lipid Panel with Direct LDL reflex Lab Collect  -     Testosterone, free, total Lab Collect  -     Insulin-like growth factor 1 (IGF-1) - Lab Collect  -     Growth hormone    Acromegaly (HCC)  -     Comprehensive metabolic panel Lab Collect; Future  -     Lipid Panel with Direct LDL reflex Lab Collect; Future  -     Testosterone, free, total Lab Collect; Future  -     Insulin-like growth factor 1 (IGF-1) - Lab Collect; Future  -     Growth hormone; Future  -     Comprehensive metabolic panel Lab Collect  -     Lipid Panel with Direct LDL reflex Lab Collect  -     Testosterone, free, total Lab Collect  -     Insulin-like growth factor 1 (IGF-1) - Lab Collect  -     Growth hormone        Assessment/Plan:  1  Acromegaly status post transsphenoidal resection of pituitary adenoma in April with pathology confirming acromegaly:  He presents today for a follow-up appointment  Other pituitary function appears intact     Biochemically he does not have cure of acromegaly after surgery  He did have cavernous sinus extension in the preoperative MRI  Discussed with the patient that at this point, medical therapy is most appropriate now that surgical debulking has been performed  Discussed medical treatment of acromegaly with him  He is hesitant to do any daily injections as he does not think that is logistically feasible in his lifestyle  I discussed that some of the pill options such as dopamine agonists do not have as high of IGF-1 normalization rates  He notes interest in the once monthly so metastatic analogs so I will reach out to a representative from these companies to start the process for this  I provided lab slips to have him have repeat IGF-1 and growth hormone level along with CMP, lipid panel, free and total testosterone in about 3 months after starting medical treatment  He has follow-up with Neurosurgery in November  He had an MRI done yesterday which is not resulted yet  2   Hypogonadotropic hypogonadism:  His testosterone level is improved in recent blood work, but still on the low side  I wonder if it all slowly increased after having surgical treatment for his pituitary adenoma in acromegaly  I will check this with his next set of blood work in 3 months  CC: Acromegaly    History of Present Illness     HPI: Maria Guadalupe Larios is a 58y o  year old male with history of hypogonadism and acromegaly who presents for a follow-up appointment  He is status post transsphenoidal resection of pituitary mass in April 2018 at [facility]  Please see my initial consultation from October of 2017 and my follow-up from February of 2018 for further details  He presents today and overall denies any changes in how he is feeling  He does note some chronic arthralgias but 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  He states his energy at times is still poor  He denies any changes in vision    In the past he states he did have a colonoscopy about 2 years ago which did show a polyp  He had an MRI done yesterday at [facility] which is not resulted yet so I do not have these results  Review of Systems   Constitutional: Positive for fatigue  HENT: Negative for trouble swallowing and voice change  Eyes: Negative for visual disturbance  Respiratory: Negative for shortness of breath  Cardiovascular: Negative for palpitations and leg swelling  Gastrointestinal: Negative for abdominal pain, nausea and vomiting  Endocrine: Negative for cold intolerance, polydipsia and polyuria  Musculoskeletal: Negative for arthralgias and myalgias  Skin: Negative for rash  Neurological: Negative for dizziness, tremors and weakness  Hematological: Negative for adenopathy  Psychiatric/Behavioral: Negative for agitation and confusion         Historical Information   Past Medical History:   Diagnosis Date    Decreased stamina     Feeling tired     Glaucoma     right eye    Lipoma     Lipoma of left shoulder     Muscle weakness     Open-angle glaucoma     left eye severe    Pituitary mass (HCC)      Past Surgical History:   Procedure Laterality Date    ANKLE SURGERY      lipoma    FINGER TENDON REPAIR      HAND SURGERY      KNEE ARTHROSCOPY      DE NEUROENDOSCOP,EXC,PIT MAKI,TRANSNAS/SPHEN N/A 4/25/2018    Procedure: Image guided endoscopic transsphenoidal approach for debulking of pituitary macroadenoma, abdominal fat graft harvesting;  Surgeon: Sam Malone MD;  [facility]: [facility];  Service: Neurosurgery    SHOULDER SURGERY      lipoma removal     Social History   History   Alcohol Use    Yes     Comment: occasional     History   Drug Use No     History   Smoking Status    Former Smoker    Types: Cigarettes    Quit date: 1997   Smokeless Tobacco    Never Used     Comment: quit 15 years ago     Family History:   Family History   Problem Relation Age of Onset    No Known Problems Mother     Cancer Father        Meds/Allergies   Current Outpatient Prescriptions   Medication Sig Dispense Refill    cholecalciferol (VITAMIN D3) 1,000 units tablet Take by mouth daily        Cyanocobalamin (VITAMIN B 12 PO) Take 1 tablet by mouth daily      latanoprost (XALATAN) 0 005 % ophthalmic solution 1 drop daily at bedtime      Multiple Vitamins-Minerals (CENTRUM ADULTS PO) Take 1 tablet by mouth daily       No current facility-administered medications for this visit  No Known Allergies    Objective   Vitals: Blood pressure 120/70, pulse 80, weight 75 5 kg (166 lb 6 4 oz)  Invasive Devices          No matching active lines, drains, or airways          Physical Exam   Constitutional: He is oriented to person, place, and time  He appears well-developed and well-nourished  No distress  HENT:   Head: Normocephalic and atraumatic  Eyes: Conjunctivae and EOM are normal  Pupils are equal, round, and reactive to light  Neck: Normal range of motion  Neck supple  No thyromegaly present  Cardiovascular: Normal rate and regular rhythm  No murmur heard  Pulmonary/Chest: Effort normal and breath sounds normal  No respiratory distress  Abdominal: Soft  Bowel sounds are normal  He exhibits no distension  Musculoskeletal: Normal range of motion  He exhibits no edema  Lymphadenopathy:     He has no cervical adenopathy  Neurological: He is alert and oriented to person, place, and time  He exhibits normal muscle tone  Skin: Skin is warm and dry  No rash noted  He is not diaphoretic  Psychiatric: He has a normal mood and affect  His behavior is normal        The history was obtained from the review of the chart and from the patient      Lab Results:      Recent Results (from the past 45151 hour(s))   TESTOSTERONE, FREE, DIRECT (HISTORICAL)    Collection Time: 10/26/17  6:42 AM   Result Value Ref Range    TESTOSTERONE FREE 5 4 (L) 6 6 - 18 1 pg/mL   SEX HORMONE BINDING GLOBULIN,SERUM (HISTORICAL)    Collection Time: 10/26/17  6:42 AM   Result Value Ref Range SEX HORMONE BINDING GLOBULIN 29 0 19 3 - 76 4 nmol/L   T4, FREE (HISTORICAL)    Collection Time: 10/26/17  6:42 AM   Result Value Ref Range    Free T4 1 03 0 82 - 1 77 ng/dL   TESTOSTERONE (HISTORICAL)    Collection Time: 10/26/17  6:42 AM   Result Value Ref Range    TESTOSTERONE TOTAL 181 (L) 264 - 916 ng/dL   PROLACTIN (HISTORICAL)    Collection Time: 10/26/17  6:42 AM   Result Value Ref Range    PROLACTIN 23 7 (H) 4 0 - 15 2 ng/mL   LUTEINIZING HORMONE (HISTORICAL)    Collection Time: 10/26/17  6:42 AM   Result Value Ref Range    LUTEINIZING HORMONE 1 2 (L) 1 7 - 8 6 mIU/mL   TSH (Historical)    Collection Time: 10/26/17  6:42 AM   Result Value Ref Range    TSH 3RD GENERATON 2 280 0 450 - 4 500 uIU/mL   FSH (Historical)    Collection Time: 10/26/17  6:42 AM   Result Value Ref Range    FSH 2 8 1 5 - 12 4 mIU/mL   GLUCOSE (2 Spec, WHO)    Collection Time: 01/25/18  6:55 AM   Result Value Ref Range    Glucose, Fasting 87 65 - 99 mg/dL    Glucose, 2 hour 91 65 - 139 mg/dL   Dexamethasone    Collection Time: 02/23/18  7:38 AM   Result Value Ref Range    Dexamethasone, Serum 108 ng/dL   Cortisol    Collection Time: 02/23/18  7:38 AM   Result Value Ref Range    Cortisol 8 7 ug/dL   Creatinine, urine, 24 hour    Collection Time: 03/14/18  7:00 AM   Result Value Ref Range    Creatinine, Urine 91 3 Not Estab  mg/dL    Creatinine, 24H Ur 1,415 1,000 - 2,000 mg/24 hr   Cortisol, Free, Urine, 24 Hour    Collection Time: 03/14/18  7:00 AM   Result Value Ref Range    Cortisol,F,ug/L,U 48 Undefined ug/L    Cortisol, 24H Ur 74 (H) 0 - 50 ug/24 hr   CBC and differential    Collection Time: 04/19/18  6:37 AM   Result Value Ref Range    White Blood Cell Count 4 9 3 4 - 10 8 x10E3/uL    Red Blood Cell Count 4 49 4 14 - 5 80 x10E6/uL    Hemoglobin 14 2 13 0 - 17 7 g/dL    HCT 43 7 37 5 - 51 0 %    MCV 97 79 - 97 fL    MCH 31 6 26 6 - 33 0 pg    MCHC 32 5 31 5 - 35 7 g/dL    RDW 13 9 12 3 - 15 4 %    Platelet Count 178 114 - 202 x10E3/uL    Neutrophils 53 Not Estab  %    Lymphocytes 35 Not Estab  %    Monocytes 8 Not Estab  %    Eosinophils 4 Not Estab  %    Basophils Relative 0 Not Estab  %    Neutrophils (Absolute) 2 6 1 4 - 7 0 x10E3/uL    Lymphocytes (Absolute) 1 7 0 7 - 3 1 x10E3/uL    Monocytes (Absolute) 0 4 0 1 - 0 9 x10E3/uL    Eosinophils (Absolute) 0 2 0 0 - 0 4 x10E3/uL    Basophils (Absolute) 0 0 0 0 - 0 2 x10E3/uL    Immature Granulocytes 0 Not Estab  %    Immature Granulocytes (Absolute) 0 0 0 0 - 0 1 x10E3/uL   Comprehensive metabolic panel    Collection Time: 04/19/18  6:37 AM   Result Value Ref Range    Glucose 96 65 - 99 mg/dL    BUN 20 8 - 27 mg/dL    Creatinine 0 73 (L) 0 76 - 1 27 mg/dL    eGFR Non African American 99 >59 mL/min/1 73    SL AMB EGFR AFRICAN AMERICAN 114 >59 mL/min/1 73    SL AMB BUN/CREATININE RATIO 27 (H) 10 - 24    Sodium 142 134 - 144 mmol/L    SL AMB POTASSIUM 4 5 3 5 - 5 2 mmol/L    Chloride 102 96 - 106 mmol/L    CO2 26 18 - 29 mmol/L    CALCIUM 9 5 8 6 - 10 2 mg/dL    SL AMB PROTEIN, TOTAL 6 6 6 0 - 8 5 g/dL    Albumin 4 1 3 6 - 4 8 g/dL    Globulin, Total 2 5 1 5 - 4 5 g/dL    SL AMB ALBUMIN/GLOBULIN RATIO 1 6 1 2 - 2 2    TOTAL BILIRUBIN 0 6 0 0 - 1 2 mg/dL    Alk Phos Isoenzymes 63 39 - 117 IU/L    SL AMB AST 25 0 - 40 IU/L    SL AMB ALT 23 0 - 44 IU/L   Urinalysis with reflex to microscopic    Collection Time: 04/19/18  6:37 AM   Result Value Ref Range    Specific Gravity 1 023 1 005 - 1 030    Ph 5 5 5 0 - 7 5    Color UA Yellow Yellow    Urine Appearance Clear Clear    SL AMB LEUKOCYTE ESTERASE URINE Negative Negative    Protein Negative Negative/Trace    Glucose, Urine Negative Negative    SL AMB KETONE, URINE, QUAL  Negative Negative    SL AMB BLOOD, URINE 2+ (A) Negative    SL AMB BILIRUBIN, URINE Negative Negative    Urobilinogen Urine 0 2 0 2 - 1 0 EU/dL    SL AMB NITRITES URINE, QUAL   Negative Negative    Microscopic Examination See below:    Microscopic Examination    Collection Time: 04/19/18  6:37 AM   Result Value Ref Range    SL AMB WBC, URINE 0-5 0 - 5 /hpf    RBC, Urine 11-30 (A) 0 - 2 /hpf    Epithelial Cells (non renal) None seen 0 - 10 /hpf    Mucus Threads Present Not Estab  Bacteria, Urine None seen None seen/Few   Type and screen    Collection Time: 04/19/18  6:37 AM   Result Value Ref Range    ABO Grouping A     Rh Type Positive     Antibody Screen Negative Negative   Protime-INR    Collection Time: 04/19/18  6:37 AM   Result Value Ref Range    INR 1 0 0 8 - 1 2    SL AMB PROTHROMBIN TIME 10 4 9 1 - 12 0 sec   HEMOGLOBIN A1C W/ EAG ESTIMATION    Collection Time: 04/19/18  6:37 AM   Result Value Ref Range    Hemoglobin A1C 5 6 4 8 - 5 6 %    Estimated Average Glucose 114 mg/dL   APTT    Collection Time: 04/19/18  6:37 AM   Result Value Ref Range    aPTT 29 24 - 33 sec   Type and screen    Collection Time: 04/25/18 12:19 PM   Result Value Ref Range    ABO Grouping A     Rh Factor Positive     Antibody Screen Negative     Specimen Expiration Date [id number]    Tissue Exam    Collection Time: 04/25/18  2:21 PM   Result Value Ref Range    Case Report       Surgical Pathology Report                         Case: [id number]                                   Authorizing Provider:  Milly Schulz MD        Collected:           04/25/2018 1421              Ordering Location:     [address] [place] [address]      Received:            04/25/2018 [address] [place] [address] Operating Room                                                      Pathologist:           Corine Grissom MD                                                         Intraop:               Jannie Evangelista MD                                                                Specimens:   A) - Brain, Intrasellar mass                                                                        B) - Brain, Intrasellar mass                                                               Final Diagnosis       A & B  Intrasellar mass, biopsy for frozen section & excision:  - Pituitary adenoma dominated by somatotroph elements with minor lactotroph component, Ki-67 labeling index < 1%, without overexpression of p53 - see Note  Interpretation performed at [facility], [address]      Note       Specimens A & B:  Routine H&E sections demonstrate anterior pituitary neuroendocrine epithelial tissue, morphologically compatible with adenoma  This case is forwarded to expert neuropathologist Dr Bandar Hager  [facility], [address] [place] [address],   whose evaluation follows:  "Dear   [facility],             I agree that this represents a pituitary adenoma  On immunohistochemical assessment, tumor cell exhibit diffuse and relatively strong labeling for growth hormone, a very minor subset being prolactin-reactive, without expression of ACTH, FSH, LH or TSH  Tumor cells also manifest perinuclear CAM 5 2 labeling, but in only a small minority does this have a dot-type pattern  I do not find overexpression of p53 and the Ki-67 labeling index is low (<1%)  The findings indicate an adenoma heavily dominated by somatotroph elements with a minor lactotroph component  The histologic and immunophenotypic profile is not that of the aggressive acidophil stem cell adenoma "    I agree with Dr Louisa Machado opinion - his original report is on file in [address] (Medical Records Department, [address] [place] [address])  Final report is faxed by   [facility] to Dr Alessia Patel @ 9:00 AM, 5/04/2018  Clinical Data:  - CT Brain (4/25/2018):  CT BRAIN - WITHOUT CONTRAST  INDICATION:   Z01 818: Encounter for other preprocedural examination  D35 2: Benign neoplasm of pituitary gland  COMPARISON:  None  TECHNIQUE: Stealth CT examination of the brain was performed  In addition to axial images, coronal 2D reformatted images were created and submitted for interpretation    Radiation dose length product (DLP) for this visit:  1434 54 mGy-cm   This examination, like all CT scans performed in the [facility], was performed utilizing techniques to minimize radiation dose exposure, including the use of iterative reconstruction and automated exposure control   IMAGE QUALITY:  Diagnostic    FINDINGS:   PARENCHYMA:  Homogeneous mass fills and expands the sella  Tumor extends asymmetrically laterally to the left cavernous sinus also suprasellar in the extension elevating the optic chiasm  Finally, tumor descends into the body of the sphenoid asymmetrically into the left bases sphenoid  Study provided for intraoperative localization   VENTRICLES AND EXTRA-AXIAL SPACES:  Normal for the patient's age    VISUALIZED ORBITS AND PARANASAL SINUSES:  Tiny calcification within the vitreous on the left    CALVARIUM AND EXTRACRANIAL SOFT TISSUES:  Normal    IMPRESSION: Localization data provided for intrasellar mass  Additional Information       All controls performed with the immunohistochemical stains reported above reacted appropriately  These tests were developed and their performance characteristics determined by [facility] or [facility]  They may not be cleared or approved by the [place]  Food and Drug Administration  The FDA has determined that such clearance or approval is not necessary  These tests are used for clinical purposes  They should not be regarded as investigational or for research  This laboratory has been approved by CLIA 88, designated as a high-complexity laboratory and is qualified to perform these tests  Intraoperative Consultation       TP/FSDxA: Findings suggestive of pituitary adenoma  Additional material being sent for permanent histology   - Dr Silvana Cabrera spoke with Dr Federico Hernandez at 2:38 pm on 04/25/2018      Gross Description       A   The specimen is received in formalin, labeled with the patient's name and hospital number, and is designated "intra sellar mass  The specimen consists of 2 pieces of tan-pink, focally friable soft tissue measuring 2 mm and 4 mm in greatest dimension  Half of the specimen is used for a touch prep and the remainder is submitted for frozen  The specimen is entirely submitted in 2 cassettes  AT  A1:  Tissue used for frozen sectioning  A2:  Remainder of tissue    B  The specimen is received in formalin, labeled with the patient's name and hospital number, and is designated "intra sellar mass  The specimen consists of multiple tan-pink soft tissue fragments measuring in aggregate 1 5 x 0 8 x 0 2 cm in greatest dimension  The specimen is entirely submitted in 1 cassette  Note: The estimated total formalin fixation time based upon information provided by the submitting clinician and the standard processing schedule is under 72 hours    [facility]     Fingerstick Glucose (POCT)    Collection Time: 04/25/18  3:49 PM   Result Value Ref Range    POC Glucose 96 65 - 140 mg/dl   APTT    Collection Time: 04/25/18  7:07 PM   Result Value Ref Range    PTT 32 23 - 35 seconds   Basic metabolic panel    Collection Time: 04/25/18  7:07 PM   Result Value Ref Range    Sodium 137 136 - 145 mmol/L    Potassium 3 7 3 5 - 5 3 mmol/L    Chloride 104 100 - 108 mmol/L    CO2 24 21 - 32 mmol/L    ANION GAP 9 4 - 13 mmol/L    BUN 19 5 - 25 mg/dL    Creatinine 0 55 (L) 0 60 - 1 30 mg/dL    Glucose 108 65 - 140 mg/dL    Calcium 9 0 8 3 - 10 1 mg/dL    eGFR 111 ml/min/1 73sq m   Basic metabolic panel    Collection Time: 04/26/18  4:34 AM   Result Value Ref Range    Sodium 138 136 - 145 mmol/L    Potassium 4 0 3 5 - 5 3 mmol/L    Chloride 106 100 - 108 mmol/L    CO2 23 21 - 32 mmol/L    ANION GAP 9 4 - 13 mmol/L    BUN 25 5 - 25 mg/dL    Creatinine 0 64 0 60 - 1 30 mg/dL    Glucose 151 (H) 65 - 140 mg/dL    Calcium 8 5 8 3 - 10 1 mg/dL    eGFR 104 ml/min/1 73sq m   CBC and differential    Collection Time: 04/26/18  4:34 AM   Result Value Ref Range    WBC 9 24 4 31 - 10 16 Thousand/uL    RBC 4 20 3 88 - 5 62 Million/uL    Hemoglobin 13 4 12 0 - 17 0 g/dL    Hematocrit 39 1 36 5 - 49 3 %    MCV 93 82 - 98 fL    MCH 31 9 26 8 - 34 3 pg    MCHC 34 3 31 4 - 37 4 g/dL    RDW 14 0 11 6 - 15 1 %    MPV 9 5 8 9 - 12 7 fL    Platelets 448 768 - 090 Thousands/uL    nRBC 0 /100 WBCs    Neutrophils Relative 83 (H) 43 - 75 %    Lymphocytes Relative 12 (L) 14 - 44 %    Monocytes Relative 5 4 - 12 %    Eosinophils Relative 0 0 - 6 %    Basophils Relative 0 0 - 1 %    Neutrophils Absolute 7 68 (H) 1 85 - 7 62 Thousands/µL    Lymphocytes Absolute 1 07 0 60 - 4 47 Thousands/µL    Monocytes Absolute 0 46 0 17 - 1 22 Thousand/µL    Eosinophils Absolute 0 00 0 00 - 0 61 Thousand/µL    Basophils Absolute 0 00 0 00 - 0 10 Thousands/µL   Osmolality    Collection Time: 04/26/18  4:34 AM   Result Value Ref Range    Osmolality Serum 289 282 - 298 mmol/KG   Protime-INR    Collection Time: 04/26/18  4:34 AM   Result Value Ref Range    Protime 14 1 12 1 - 14 4 seconds    INR 1 09 0 86 - 1 16   Magnesium    Collection Time: 04/26/18  4:34 AM   Result Value Ref Range    Magnesium 1 9 1 6 - 2 6 mg/dL   Phosphorus    Collection Time: 04/26/18  4:34 AM   Result Value Ref Range    Phosphorus 3 7 2 3 - 4 1 mg/dL   Basic metabolic panel    Collection Time: 04/27/18 11:29 AM   Result Value Ref Range    Sodium 136 136 - 145 mmol/L    Potassium 3 7 3 5 - 5 3 mmol/L    Chloride 104 100 - 108 mmol/L    CO2 25 21 - 32 mmol/L    ANION GAP 7 4 - 13 mmol/L    BUN 16 5 - 25 mg/dL    Creatinine 0 57 (L) 0 60 - 1 30 mg/dL    Glucose 101 65 - 140 mg/dL    Calcium 9 4 8 3 - 10 1 mg/dL    eGFR 109 ml/min/1 73sq m   CBC and differential    Collection Time: 04/27/18 11:29 AM   Result Value Ref Range    WBC 9 27 4 31 - 10 16 Thousand/uL    RBC 4 51 3 88 - 5 62 Million/uL    Hemoglobin 14 4 12 0 - 17 0 g/dL    Hematocrit 42 1 36 5 - 49 3 %    MCV 93 82 - 98 fL    MCH 31 9 26 8 - 34 3 pg    MCHC 34 2 31 4 - 37 4 g/dL    RDW 14 1 11 6 - 15 1 %    MPV 9 7 8 9 - 12 7 fL    Platelets 093 562 - 638 Thousands/uL    nRBC 0 /100 WBCs    Neutrophils Relative 80 (H) 43 - 75 %    Lymphocytes Relative 15 14 - 44 %    Monocytes Relative 5 4 - 12 %    Eosinophils Relative 0 0 - 6 %    Basophils Relative 0 0 - 1 %    Neutrophils Absolute 7 42 1 85 - 7 62 Thousands/µL    Lymphocytes Absolute 1 36 0 60 - 4 47 Thousands/µL    Monocytes Absolute 0 47 0 17 - 1 22 Thousand/µL    Eosinophils Absolute 0 00 0 00 - 0 61 Thousand/µL    Basophils Absolute 0 01 0 00 - 0 10 Thousands/µL   Basic metabolic panel    Collection Time: 04/28/18  4:45 AM   Result Value Ref Range    Sodium 132 (L) 136 - 145 mmol/L    Potassium 3 6 3 5 - 5 3 mmol/L    Chloride 98 (L) 100 - 108 mmol/L    CO2 26 21 - 32 mmol/L    ANION GAP 8 4 - 13 mmol/L    BUN 16 5 - 25 mg/dL    Creatinine 0 53 (L) 0 60 - 1 30 mg/dL    Glucose 93 65 - 140 mg/dL    Calcium 9 5 8 3 - 10 1 mg/dL    eGFR 113 ml/min/1 73sq m   Basic metabolic panel    Collection Time: 04/29/18  5:33 AM   Result Value Ref Range    Sodium 131 (L) 136 - 145 mmol/L    Potassium 3 9 3 5 - 5 3 mmol/L    Chloride 97 (L) 100 - 108 mmol/L    CO2 26 21 - 32 mmol/L    ANION GAP 8 4 - 13 mmol/L    BUN 22 5 - 25 mg/dL    Creatinine 0 78 0 60 - 1 30 mg/dL    Glucose 91 65 - 140 mg/dL    Calcium 9 7 8 3 - 10 1 mg/dL    eGFR 96 ml/min/1 73sq m   Insulin-like growth factor    Collection Time: 09/10/18  6:58 AM   Result Value Ref Range    Insulin-Like GF-1 549 (H) 49 - 188 ng/mL   Growth hormone    Collection Time: 09/10/18  6:58 AM   Result Value Ref Range    Growth Hormone 8 8 0 0 - 10 0 ng/mL   BUN    Collection Time: 09/10/18  6:58 AM   Result Value Ref Range    BUN 15 8 - 27 mg/dL   Creatinine, serum    Collection Time: 09/10/18  6:58 AM   Result Value Ref Range    Creatinine 0 73 (L) 0 76 - 1 27 mg/dL    eGFR Non  98 >59 mL/min/1 73    SL AMB EGFR AFRICAN AMERICAN 113 >59 mL/min/1 73   Insulin-like growth factor- Lab Collect Collection Time: 09/10/18  7:00 AM   Result Value Ref Range    Insulin-Like GF-1 578 (H) 49 - 188 ng/mL   Growth hormone    Collection Time: 09/10/18  7:00 AM   Result Value Ref Range    Growth Hormone 8 9 0 0 - 10 0 ng/mL   Testosterone, free, total    Collection Time: 09/10/18  7:00 AM   Result Value Ref Range    TESTOSTERONE TOTAL 263 (L) 264 - 916 ng/dL    Testosterone, Free 5 3 (L) 6 6 - 18 1 pg/mL   Luteinizing hormone    Collection Time: 09/10/18  7:00 AM   Result Value Ref Range    Luteinizing Hormone (LH) 2 6 1 7 - 8 6 mIU/mL   Follicle stimulating hormone    Collection Time: 09/10/18  7:00 AM   Result Value Ref Range    FSH 3 0 1 5 - 12 4 mIU/mL   Prolactin    Collection Time: 09/10/18  7:00 AM   Result Value Ref Range    Prolactin 14 6 4 0 - 15 2 ng/mL   Comprehensive metabolic panel    Collection Time: 09/10/18  7:00 AM   Result Value Ref Range    Glucose 95 65 - 99 mg/dL    BUN 15 8 - 27 mg/dL    Creatinine 0 71 (L) 0 76 - 1 27 mg/dL    eGFR Non African American 99 >59 mL/min/1 73    SL AMB EGFR AFRICAN AMERICAN 115 >59 mL/min/1 73    SL AMB BUN/CREATININE RATIO 21 10 - 24    Sodium 141 134 - 144 mmol/L    SL AMB POTASSIUM 4 4 3 5 - 5 2 mmol/L    Chloride 101 96 - 106 mmol/L    CO2 24 20 - 29 mmol/L    CALCIUM 9 7 8 6 - 10 2 mg/dL    SL AMB PROTEIN, TOTAL 6 3 6 0 - 8 5 g/dL    Albumin 4 2 3 6 - 4 8 g/dL    Globulin, Total 2 1 1 5 - 4 5 g/dL    SL AMB ALBUMIN/GLOBULIN RATIO 2 0 1 2 - 2 2    TOTAL BILIRUBIN 0 5 0 0 - 1 2 mg/dL    Alk Phos Isoenzymes 61 39 - 117 IU/L    SL AMB AST 29 0 - 40 IU/L    SL AMB ALT 33 0 - 44 IU/L   TSH, 3rd generation    Collection Time: 09/10/18  7:00 AM   Result Value Ref Range    TSH 2 940 0 450 - 4 500 uIU/mL   T4, free    Collection Time: 09/10/18  7:00 AM   Result Value Ref Range    Free t4 1 12 0 82 - 1 77 ng/dL   Cortisol Level, AM Specimen    Collection Time: 09/10/18  7:00 AM   Result Value Ref Range    Cortisol AM 12 4 6 2 - 19 4 ug/dL       Future Appointments  Date Time Provider Trell Garcia   11/6/2018 3:00 PM Quinton Hernandez MD NEURO [facility]   3/21/2019 2:50 PM DO DANK Ridley [id number] [facility]

## 2018-09-18 NOTE — PROGRESS NOTES
9/18/2018    Assessment/Plan      Diagnoses and all orders for this visit:    Hypogonadotropic hypogonadism ([address] )  -     Comprehensive metabolic panel Lab Collect; Future  -     Lipid Panel with Direct LDL reflex Lab Collect; Future  -     Testosterone, free, total Lab Collect; Future  -     Insulin-like growth factor 1 (IGF-1) - Lab Collect; Future  -     Growth hormone; Future  -     Comprehensive metabolic panel Lab Collect  -     Lipid Panel with Direct LDL reflex Lab Collect  -     Testosterone, free, total Lab Collect  -     Insulin-like growth factor 1 (IGF-1) - Lab Collect  -     Growth hormone    Acromegaly (HCC)  -     Comprehensive metabolic panel Lab Collect; Future  -     Lipid Panel with Direct LDL reflex Lab Collect; Future  -     Testosterone, free, total Lab Collect; Future  -     Insulin-like growth factor 1 (IGF-1) - Lab Collect; Future  -     Growth hormone; Future  -     Comprehensive metabolic panel Lab Collect  -     Lipid Panel with Direct LDL reflex Lab Collect  -     Testosterone, free, total Lab Collect  -     Insulin-like growth factor 1 (IGF-1) - Lab Collect  -     Growth hormone        Assessment/Plan:  1  Acromegaly status post transsphenoidal resection of pituitary adenoma in April with pathology confirming acromegaly:  He presents today for a follow-up appointment  Other pituitary function appears intact  Biochemically he does not have cure of acromegaly after surgery  He did have cavernous sinus extension in the preoperative MRI  Discussed with the patient that at this point, medical therapy is most appropriate now that surgical debulking has been performed  Discussed medical treatment of acromegaly with him  He is hesitant to do any daily injections as he does not think that is logistically feasible in his lifestyle  I discussed that some of the pill options such as dopamine agonists do not have as high of IGF-1 normalization rates    He notes interest in the once monthly so metastatic analogs so I will reach out to a representative from these companies to start the process for this  I provided lab slips to have him have repeat IGF-1 and growth hormone level along with CMP, lipid panel, free and total testosterone in about 3 months after starting medical treatment  He has follow-up with Neurosurgery in November  He had an MRI done yesterday which is not resulted yet  2   Hypogonadotropic hypogonadism:  His testosterone level is improved in recent blood work, but still on the low side  I wonder if it all slowly increased after having surgical treatment for his pituitary adenoma in acromegaly  I will check this with his next set of blood work in 3 months  CC: Acromegaly    History of Present Illness     HPI: Zeke Obando is a 58y o  year old male with history of hypogonadism and acromegaly who presents for a follow-up appointment  He is status post transsphenoidal resection of pituitary mass in April 2018 at [facility]  Please see my initial consultation from October of 2017 and my follow-up from February of 2018 for further details  He presents today and overall denies any changes in how he is feeling  He does note some chronic arthralgias but 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  He states his energy at times is still poor  He denies any changes in vision  In the past he states he did have a colonoscopy about 2 years ago which did show a polyp  He had an MRI done yesterday at [facility] which is not resulted yet so I do not have these results  Review of Systems   Constitutional: Positive for fatigue  HENT: Negative for trouble swallowing and voice change  Eyes: Negative for visual disturbance  Respiratory: Negative for shortness of breath  Cardiovascular: Negative for palpitations and leg swelling  Gastrointestinal: Negative for abdominal pain, nausea and vomiting  Endocrine: Negative for cold intolerance, polydipsia and polyuria  Musculoskeletal: Negative for arthralgias and myalgias  Skin: Negative for rash  Neurological: Negative for dizziness, tremors and weakness  Hematological: Negative for adenopathy  Psychiatric/Behavioral: Negative for agitation and confusion  Historical Information   Past Medical History:   Diagnosis Date    Decreased stamina     Feeling tired     Glaucoma     right eye    Lipoma     Lipoma of left shoulder     Muscle weakness     Open-angle glaucoma     left eye severe    Pituitary mass (HCC)      Past Surgical History:   Procedure Laterality Date    ANKLE SURGERY      lipoma    FINGER TENDON REPAIR      HAND SURGERY      KNEE ARTHROSCOPY      AR NEUROENDOSCOP,EXC,PIT MAKI,TRANSNAS/SPHEN N/A 4/25/2018    Procedure: Image guided endoscopic transsphenoidal approach for debulking of pituitary macroadenoma, abdominal fat graft harvesting;  Surgeon: Raulito Madrid MD;  Location: [facility];  Service: Neurosurgery    SHOULDER SURGERY      lipoma removal     Social History   History   Alcohol Use    Yes     Comment: occasional     History   Drug Use No     History   Smoking Status    Former Smoker    Types: Cigarettes    Quit date: 1997   Smokeless Tobacco    Never Used     Comment: quit 15 years ago     Family History:   Family History   Problem Relation Age of Onset    No Known Problems Mother     Cancer Father        Meds/Allergies   Current Outpatient Prescriptions   Medication Sig Dispense Refill    cholecalciferol (VITAMIN D3) 1,000 units tablet Take by mouth daily        Cyanocobalamin (VITAMIN B 12 PO) Take 1 tablet by mouth daily      latanoprost (XALATAN) 0 005 % ophthalmic solution 1 drop daily at bedtime      Multiple Vitamins-Minerals (CENTRUM ADULTS PO) Take 1 tablet by mouth daily       No current facility-administered medications for this visit  No Known Allergies    Objective   Vitals: Blood pressure 120/70, pulse 80, weight 75 5 kg (166 lb 6 4 oz)    Invasive Devices          No matching active lines, drains, or airways          Physical Exam   Constitutional: He is oriented to person, place, and time  He appears well-developed and well-nourished  No distress  HENT:   Head: Normocephalic and atraumatic  Eyes: Conjunctivae and EOM are normal  Pupils are equal, round, and reactive to light  Neck: Normal range of motion  Neck supple  No thyromegaly present  Cardiovascular: Normal rate and regular rhythm  No murmur heard  Pulmonary/Chest: Effort normal and breath sounds normal  No respiratory distress  Abdominal: Soft  Bowel sounds are normal  He exhibits no distension  Musculoskeletal: Normal range of motion  He exhibits no edema  Lymphadenopathy:     He has no cervical adenopathy  Neurological: He is alert and oriented to person, place, and time  He exhibits normal muscle tone  Skin: Skin is warm and dry  No rash noted  He is not diaphoretic  Psychiatric: He has a normal mood and affect  His behavior is normal        The history was obtained from the review of the chart and from the patient      Lab Results:      Recent Results (from the past 30255 hour(s))   TESTOSTERONE, FREE, DIRECT (HISTORICAL)    Collection Time: 10/26/17  6:42 AM   Result Value Ref Range    TESTOSTERONE FREE 5 4 (L) 6 6 - 18 1 pg/mL   SEX HORMONE BINDING GLOBULIN,SERUM (HISTORICAL)    Collection Time: 10/26/17  6:42 AM   Result Value Ref Range    SEX HORMONE BINDING GLOBULIN 29 0 19 3 - 76 4 nmol/L   T4, FREE (HISTORICAL)    Collection Time: 10/26/17  6:42 AM   Result Value Ref Range    Free T4 1 03 0 82 - 1 77 ng/dL   TESTOSTERONE (HISTORICAL)    Collection Time: 10/26/17  6:42 AM   Result Value Ref Range    TESTOSTERONE TOTAL 181 (L) 264 - 916 ng/dL   PROLACTIN (HISTORICAL)    Collection Time: 10/26/17  6:42 AM   Result Value Ref Range    PROLACTIN 23 7 (H) 4 0 - 15 2 ng/mL   LUTEINIZING HORMONE (HISTORICAL)    Collection Time: 10/26/17  6:42 AM   Result Value Ref Range LUTEINIZING HORMONE 1 2 (L) 1 7 - 8 6 mIU/mL   TSH (Historical)    Collection Time: 10/26/17  6:42 AM   Result Value Ref Range    TSH 3RD GENERATON 2 280 0 450 - 4 500 uIU/mL   FSH (Historical)    Collection Time: 10/26/17  6:42 AM   Result Value Ref Range    FSH 2 8 1 5 - 12 4 mIU/mL   GLUCOSE (2 Spec, WHO)    Collection Time: 01/25/18  6:55 AM   Result Value Ref Range    Glucose, Fasting 87 65 - 99 mg/dL    Glucose, 2 hour 91 65 - 139 mg/dL   Dexamethasone    Collection Time: 02/23/18  7:38 AM   Result Value Ref Range    Dexamethasone, Serum 108 ng/dL   Cortisol    Collection Time: 02/23/18  7:38 AM   Result Value Ref Range    Cortisol 8 7 ug/dL   Creatinine, urine, 24 hour    Collection Time: 03/14/18  7:00 AM   Result Value Ref Range    Creatinine, Urine 91 3 Not Estab  mg/dL    Creatinine, 24H Ur 1,415 1,000 - 2,000 mg/24 hr   Cortisol, Free, Urine, 24 Hour    Collection Time: 03/14/18  7:00 AM   Result Value Ref Range    Cortisol,F,ug/L,U 48 Undefined ug/L    Cortisol, 24H Ur 74 (H) 0 - 50 ug/24 hr   CBC and differential    Collection Time: 04/19/18  6:37 AM   Result Value Ref Range    White Blood Cell Count 4 9 3 4 - 10 8 x10E3/uL    Red Blood Cell Count 4 49 4 14 - 5 80 x10E6/uL    Hemoglobin 14 2 13 0 - 17 7 g/dL    HCT 43 7 37 5 - 51 0 %    MCV 97 79 - 97 fL    MCH 31 6 26 6 - 33 0 pg    MCHC 32 5 31 5 - 35 7 g/dL    RDW 13 9 12 3 - 15 4 %    Platelet Count 835 429 - 379 x10E3/uL    Neutrophils 53 Not Estab  %    Lymphocytes 35 Not Estab  %    Monocytes 8 Not Estab  %    Eosinophils 4 Not Estab  %    Basophils Relative 0 Not Estab  %    Neutrophils (Absolute) 2 6 1 4 - 7 0 x10E3/uL    Lymphocytes (Absolute) 1 7 0 7 - 3 1 x10E3/uL    Monocytes (Absolute) 0 4 0 1 - 0 9 x10E3/uL    Eosinophils (Absolute) 0 2 0 0 - 0 4 x10E3/uL    Basophils (Absolute) 0 0 0 0 - 0 2 x10E3/uL    Immature Granulocytes 0 Not Estab  %    Immature Granulocytes (Absolute) 0 0 0 0 - 0 1 x10E3/uL   Comprehensive metabolic panel Collection Time: 04/19/18  6:37 AM   Result Value Ref Range    Glucose 96 65 - 99 mg/dL    BUN 20 8 - 27 mg/dL    Creatinine 0 73 (L) 0 76 - 1 27 mg/dL    eGFR Non African American 99 >59 mL/min/1 73    SL AMB EGFR AFRICAN AMERICAN 114 >59 mL/min/1 73    SL AMB BUN/CREATININE RATIO 27 (H) 10 - 24    Sodium 142 134 - 144 mmol/L    SL AMB POTASSIUM 4 5 3 5 - 5 2 mmol/L    Chloride 102 96 - 106 mmol/L    CO2 26 18 - 29 mmol/L    CALCIUM 9 5 8 6 - 10 2 mg/dL    SL AMB PROTEIN, TOTAL 6 6 6 0 - 8 5 g/dL    Albumin 4 1 3 6 - 4 8 g/dL    Globulin, Total 2 5 1 5 - 4 5 g/dL    SL AMB ALBUMIN/GLOBULIN RATIO 1 6 1 2 - 2 2    TOTAL BILIRUBIN 0 6 0 0 - 1 2 mg/dL    Alk Phos Isoenzymes 63 39 - 117 IU/L    SL AMB AST 25 0 - 40 IU/L    SL AMB ALT 23 0 - 44 IU/L   Urinalysis with reflex to microscopic    Collection Time: 04/19/18  6:37 AM   Result Value Ref Range    Specific Gravity 1 023 1 005 - 1 030    Ph 5 5 5 0 - 7 5    Color UA Yellow Yellow    Urine Appearance Clear Clear    SL AMB LEUKOCYTE ESTERASE URINE Negative Negative    Protein Negative Negative/Trace    Glucose, Urine Negative Negative    SL AMB KETONE, URINE, QUAL  Negative Negative    SL AMB BLOOD, URINE 2+ (A) Negative    SL AMB BILIRUBIN, URINE Negative Negative    Urobilinogen Urine 0 2 0 2 - 1 0 EU/dL    SL AMB NITRITES URINE, QUAL  Negative Negative    Microscopic Examination See below:    Microscopic Examination    Collection Time: 04/19/18  6:37 AM   Result Value Ref Range    SL AMB WBC, URINE 0-5 0 - 5 /hpf    RBC, Urine 11-30 (A) 0 - 2 /hpf    Epithelial Cells (non renal) None seen 0 - 10 /hpf    Mucus Threads Present Not Estab      Bacteria, Urine None seen None seen/Few   Type and screen    Collection Time: 04/19/18  6:37 AM   Result Value Ref Range    ABO Grouping A     Rh Type Positive     Antibody Screen Negative Negative   Protime-INR    Collection Time: 04/19/18  6:37 AM   Result Value Ref Range    INR 1 0 0 8 - 1 2    SL AMB PROTHROMBIN TIME 10 4 9 1 - 12 0 sec   HEMOGLOBIN A1C W/ EAG ESTIMATION    Collection Time: 04/19/18  6:37 AM   Result Value Ref Range    Hemoglobin A1C 5 6 4 8 - 5 6 %    Estimated Average Glucose 114 mg/dL   APTT    Collection Time: 04/19/18  6:37 AM   Result Value Ref Range    aPTT 29 24 - 33 sec   Type and screen    Collection Time: 04/25/18 12:19 PM   Result Value Ref Range    ABO Grouping A     Rh Factor Positive     Antibody Screen Negative     Specimen Expiration Date [id number]    Tissue Exam    Collection Time: 04/25/18  2:21 PM   Result Value Ref Range    Case Report       Surgical Pathology Report                         Case: [id number]                                   Authorizing Provider:  Rodney Amaya MD        Collected:           04/25/2018 1421              Ordering Location:     [address] [place] [address]      Received:            04/25/2018 [facility],[address] B Operating Room                                                      Pathologist:           Ashley Stinson MD                                                         Intraop:               Colletta Briar, MD                                                                Specimens:   A) - Brain, Intrasellar mass                                                                        B) - Brain, Intrasellar mass                                                               Final Diagnosis       A & B  Intrasellar mass, biopsy for frozen section & excision:  - Pituitary adenoma dominated by somatotroph elements with minor lactotroph component, Ki-67 labeling index < 1%, without overexpression of p53 - see Note  Interpretation performed at [facility], [address]      Note       Specimens A & B:  Routine H&E sections demonstrate anterior pituitary neuroendocrine epithelial tissue, morphologically compatible with adenoma  This case is forwarded to expert neuropathologist Dr Adi [facility], [address] [place] [address],   whose evaluation follows:  "Dear Dr Saint Clair,             I agree that this represents a pituitary adenoma  On immunohistochemical assessment, tumor cell exhibit diffuse and relatively strong labeling for growth hormone, a very minor subset being prolactin-reactive, without expression of ACTH, FSH, LH or TSH  Tumor cells also manifest perinuclear CAM 5 2 labeling, but in only a small minority does this have a dot-type pattern  I do not find overexpression of p53 and the Ki-67 labeling index is low (<1%)  The findings indicate an adenoma heavily dominated by somatotroph elements with a minor lactotroph component  The histologic and immunophenotypic profile is not that of the aggressive acidophil stem cell adenoma "    I agree with Dr Felix Figueroa opinion - his original report is on file in [address] (Medical Records Department, [address] [place] [address])  Final report is faxed by Dr Saint Clair to Dr Betina Piña @ 9:00 AM, 5/04/2018  Clinical Data:  - CT Brain (4/25/2018):  CT BRAIN - WITHOUT CONTRAST  INDICATION:   Z01 818: Encounter for other preprocedural examination  D35 2: Benign neoplasm of pituitary gland  COMPARISON:  None  TECHNIQUE: Stealth CT examination of the brain was performed  In addition to axial images, coronal 2D reformatted images were created and submitted for interpretation  Radiation dose length product (DLP) for this visit:  1434 54 mGy-cm   This examination, like all CT scans performed in the [facility], was performed utilizing techniques to minimize radiation dose exposure, including the use of iterative reconstruction and automated exposure control   IMAGE QUALITY:  Diagnostic    FINDINGS:   PARENCHYMA:  Homogeneous mass fills and expands the sella  Tumor extends asymmetrically laterally to the left cavernous sinus also suprasellar in the extension elevating the optic chiasm    Finally, tumor descends into the body of the sphenoid asymmetrically into the left bases sphenoid  Study provided for intraoperative localization   VENTRICLES AND EXTRA-AXIAL SPACES:  Normal for the patient's age    VISUALIZED ORBITS AND PARANASAL SINUSES:  Tiny calcification within the vitreous on the left    CALVARIUM AND EXTRACRANIAL SOFT TISSUES:  Normal    IMPRESSION: Localization data provided for intrasellar mass  Additional Information       All controls performed with the immunohistochemical stains reported above reacted appropriately  These tests were developed and their performance characteristics determined by [facility] or [facility]  They may not be cleared or approved by the [place]  Food and Drug Administration  The FDA has determined that such clearance or approval is not necessary  These tests are used for clinical purposes  They should not be regarded as investigational or for research  This laboratory has been approved by [place] [address], designated as a high-complexity laboratory and is qualified to perform these tests  Intraoperative Consultation       TP/FSDxA: Findings suggestive of pituitary adenoma  Additional material being sent for permanent histology   - Dr Julio Benavides spoke with Dr Benson Reed at 2:38 pm on 04/25/2018      Gross Description       A  The specimen is received in formalin, labeled with the patient's name and hospital number, and is designated "intra sellar mass  The specimen consists of 2 pieces of tan-pink, focally friable soft tissue measuring 2 mm and 4 mm in greatest dimension  Half of the specimen is used for a touch prep and the remainder is submitted for frozen  The specimen is entirely submitted in 2 cassettes  AT  A1:  Tissue used for frozen sectioning  A2:  Remainder of tissue    B  The specimen is received in formalin, labeled with the patient's name and hospital number, and is designated "intra sellar mass    The specimen consists of multiple tan-pink soft tissue fragments measuring in aggregate 1 5 x 0 8 x 0 2 cm in greatest dimension  The specimen is entirely submitted in 1 cassette  Note: The estimated total formalin fixation time based upon information provided by the submitting clinician and the standard processing schedule is under 72 hours    [facility]     Fingerstick Glucose (POCT)    Collection Time: 04/25/18  3:49 PM   Result Value Ref Range    POC Glucose 96 65 - 140 mg/dl   APTT    Collection Time: 04/25/18  7:07 PM   Result Value Ref Range    PTT 32 23 - 35 seconds   Basic metabolic panel    Collection Time: 04/25/18  7:07 PM   Result Value Ref Range    Sodium 137 136 - 145 mmol/L    Potassium 3 7 3 5 - 5 3 mmol/L    Chloride 104 100 - 108 mmol/L    CO2 24 21 - 32 mmol/L    ANION GAP 9 4 - 13 mmol/L    BUN 19 5 - 25 mg/dL    Creatinine 0 55 (L) 0 60 - 1 30 mg/dL    Glucose 108 65 - 140 mg/dL    Calcium 9 0 8 3 - 10 1 mg/dL    eGFR 111 ml/min/1 73sq m   Basic metabolic panel    Collection Time: 04/26/18  4:34 AM   Result Value Ref Range    Sodium 138 136 - 145 mmol/L    Potassium 4 0 3 5 - 5 3 mmol/L    Chloride 106 100 - 108 mmol/L    CO2 23 21 - 32 mmol/L    ANION GAP 9 4 - 13 mmol/L    BUN 25 5 - 25 mg/dL    Creatinine 0 64 0 60 - 1 30 mg/dL    Glucose 151 (H) 65 - 140 mg/dL    Calcium 8 5 8 3 - 10 1 mg/dL    eGFR 104 ml/min/1 73sq m   CBC and differential    Collection Time: 04/26/18  4:34 AM   Result Value Ref Range    WBC 9 24 4 31 - 10 16 Thousand/uL    RBC 4 20 3 88 - 5 62 Million/uL    Hemoglobin 13 4 12 0 - 17 0 g/dL    Hematocrit 39 1 36 5 - 49 3 %    MCV 93 82 - 98 fL    MCH 31 9 26 8 - 34 3 pg    MCHC 34 3 31 4 - 37 4 g/dL    RDW 14 0 11 6 - 15 1 %    MPV 9 5 8 9 - 12 7 fL    Platelets 343 928 - 876 Thousands/uL    nRBC 0 /100 WBCs    Neutrophils Relative 83 (H) 43 - 75 %    Lymphocytes Relative 12 (L) 14 - 44 %    Monocytes Relative 5 4 - 12 %    Eosinophils Relative 0 0 - 6 %    Basophils Relative 0 0 - 1 %    Neutrophils Absolute 7 68 (H) 1 85 - 7 62 Thousands/µL    Lymphocytes Absolute 1 07 0 60 - 4 47 Thousands/µL    Monocytes Absolute 0 46 0 17 - 1 22 Thousand/µL    Eosinophils Absolute 0 00 0 00 - 0 61 Thousand/µL    Basophils Absolute 0 00 0 00 - 0 10 Thousands/µL   Osmolality    Collection Time: 04/26/18  4:34 AM   Result Value Ref Range    Osmolality Serum 289 282 - 298 mmol/KG   Protime-INR    Collection Time: 04/26/18  4:34 AM   Result Value Ref Range    Protime 14 1 12 1 - 14 4 seconds    INR 1 09 0 86 - 1 16   Magnesium    Collection Time: 04/26/18  4:34 AM   Result Value Ref Range    Magnesium 1 9 1 6 - 2 6 mg/dL   Phosphorus    Collection Time: 04/26/18  4:34 AM   Result Value Ref Range    Phosphorus 3 7 2 3 - 4 1 mg/dL   Basic metabolic panel    Collection Time: 04/27/18 11:29 AM   Result Value Ref Range    Sodium 136 136 - 145 mmol/L    Potassium 3 7 3 5 - 5 3 mmol/L    Chloride 104 100 - 108 mmol/L    CO2 25 21 - 32 mmol/L    ANION GAP 7 4 - 13 mmol/L    BUN 16 5 - 25 mg/dL    Creatinine 0 57 (L) 0 60 - 1 30 mg/dL    Glucose 101 65 - 140 mg/dL    Calcium 9 4 8 3 - 10 1 mg/dL    eGFR 109 ml/min/1 73sq m   CBC and differential    Collection Time: 04/27/18 11:29 AM   Result Value Ref Range    WBC 9 27 4 31 - 10 16 Thousand/uL    RBC 4 51 3 88 - 5 62 Million/uL    Hemoglobin 14 4 12 0 - 17 0 g/dL    Hematocrit 42 1 36 5 - 49 3 %    MCV 93 82 - 98 fL    MCH 31 9 26 8 - 34 3 pg    MCHC 34 2 31 4 - 37 4 g/dL    RDW 14 1 11 6 - 15 1 %    MPV 9 7 8 9 - 12 7 fL    Platelets 807 527 - 140 Thousands/uL    nRBC 0 /100 WBCs    Neutrophils Relative 80 (H) 43 - 75 %    Lymphocytes Relative 15 14 - 44 %    Monocytes Relative 5 4 - 12 %    Eosinophils Relative 0 0 - 6 %    Basophils Relative 0 0 - 1 %    Neutrophils Absolute 7 42 1 85 - 7 62 Thousands/µL    Lymphocytes Absolute 1 36 0 60 - 4 47 Thousands/µL    Monocytes Absolute 0 47 0 17 - 1 22 Thousand/µL    Eosinophils Absolute 0 00 0 00 - 0 61 Thousand/µL    Basophils Absolute 0 01 0 00 - 0 10 Thousands/µL   Basic metabolic panel    Collection Time: 04/28/18  4:45 AM   Result Value Ref Range    Sodium 132 (L) 136 - 145 mmol/L    Potassium 3 6 3 5 - 5 3 mmol/L    Chloride 98 (L) 100 - 108 mmol/L    CO2 26 21 - 32 mmol/L    ANION GAP 8 4 - 13 mmol/L    BUN 16 5 - 25 mg/dL    Creatinine 0 53 (L) 0 60 - 1 30 mg/dL    Glucose 93 65 - 140 mg/dL    Calcium 9 5 8 3 - 10 1 mg/dL    eGFR 113 ml/min/1 73sq m   Basic metabolic panel    Collection Time: 04/29/18  5:33 AM   Result Value Ref Range    Sodium 131 (L) 136 - 145 mmol/L    Potassium 3 9 3 5 - 5 3 mmol/L    Chloride 97 (L) 100 - 108 mmol/L    CO2 26 21 - 32 mmol/L    ANION GAP 8 4 - 13 mmol/L    BUN 22 5 - 25 mg/dL    Creatinine 0 78 0 60 - 1 30 mg/dL    Glucose 91 65 - 140 mg/dL    Calcium 9 7 8 3 - 10 1 mg/dL    eGFR 96 ml/min/1 73sq m   Insulin-like growth factor    Collection Time: 09/10/18  6:58 AM   Result Value Ref Range    Insulin-Like GF-1 549 (H) 49 - 188 ng/mL   Growth hormone    Collection Time: 09/10/18  6:58 AM   Result Value Ref Range    Growth Hormone 8 8 0 0 - 10 0 ng/mL   BUN    Collection Time: 09/10/18  6:58 AM   Result Value Ref Range    BUN 15 8 - 27 mg/dL   Creatinine, serum    Collection Time: 09/10/18  6:58 AM   Result Value Ref Range    Creatinine 0 73 (L) 0 76 - 1 27 mg/dL    eGFR Non  98 >59 mL/min/1 73    SL AMB EGFR AFRICAN AMERICAN 113 >59 mL/min/1 73   Insulin-like growth factor- Lab Collect    Collection Time: 09/10/18  7:00 AM   Result Value Ref Range    Insulin-Like GF-1 578 (H) 49 - 188 ng/mL   Growth hormone    Collection Time: 09/10/18  7:00 AM   Result Value Ref Range    Growth Hormone 8 9 0 0 - 10 0 ng/mL   Testosterone, free, total    Collection Time: 09/10/18  7:00 AM   Result Value Ref Range    TESTOSTERONE TOTAL 263 (L) 264 - 916 ng/dL    Testosterone, Free 5 3 (L) 6 6 - 18 1 pg/mL   Luteinizing hormone    Collection Time: 09/10/18  7:00 AM   Result Value Ref Range    Luteinizing Hormone (LH) 2 6 1 7 - 8 6 mIU/mL   Follicle stimulating hormone    Collection Time: 09/10/18  7:00 AM   Result Value Ref Range    FSH 3 0 1 5 - 12 4 mIU/mL   Prolactin    Collection Time: 09/10/18  7:00 AM   Result Value Ref Range    Prolactin 14 6 4 0 - 15 2 ng/mL   Comprehensive metabolic panel    Collection Time: 09/10/18  7:00 AM   Result Value Ref Range    Glucose 95 65 - 99 mg/dL    BUN 15 8 - 27 mg/dL    Creatinine 0 71 (L) 0 76 - 1 27 mg/dL    eGFR Non African American 99 >59 mL/min/1 73    SL AMB EGFR AFRICAN AMERICAN 115 >59 mL/min/1 73    SL AMB BUN/CREATININE RATIO 21 10 - 24    Sodium 141 134 - 144 mmol/L    SL AMB POTASSIUM 4 4 3 5 - 5 2 mmol/L    Chloride 101 96 - 106 mmol/L    CO2 24 20 - 29 mmol/L    CALCIUM 9 7 8 6 - 10 2 mg/dL    SL AMB PROTEIN, TOTAL 6 3 6 0 - 8 5 g/dL    Albumin 4 2 3 6 - 4 8 g/dL    Globulin, Total 2 1 1 5 - 4 5 g/dL    SL AMB ALBUMIN/GLOBULIN RATIO 2 0 1 2 - 2 2    TOTAL BILIRUBIN 0 5 0 0 - 1 2 mg/dL    Alk Phos Isoenzymes 61 39 - 117 IU/L    SL AMB AST 29 0 - 40 IU/L    SL AMB ALT 33 0 - 44 IU/L   TSH, 3rd generation    Collection Time: 09/10/18  7:00 AM   Result Value Ref Range    TSH 2 940 0 450 - 4 500 uIU/mL   T4, free    Collection Time: 09/10/18  7:00 AM   Result Value Ref Range    Free t4 1 12 0 82 - 1 77 ng/dL   Cortisol Level, AM Specimen    Collection Time: 09/10/18  7:00 AM   Result Value Ref Range    Cortisol AM 12 4 6 2 - 19 4 ug/dL       Future Appointments  Date Time Provider [facility]   11/6/2018 3:00 PM Myron Salter MD NEURO [facility]   3/21/2019 2:50 PM Gaudencio Salmeron DO ENDO [facility]

## 2018-09-19 ENCOUNTER — TELEPHONE (OUTPATIENT)
Dept: ENDOCRINOLOGY | Facility: HOSPITAL | Age: 63
End: 2018-09-19

## 2018-09-19 NOTE — TELEPHONE ENCOUNTER
Left voicemail for patient to come by the office and sign the patient authorization form for lanreotide for the company to look into the [address] program in terms of co-pay assistance, home health care administration, etc   Once this form is signed, I will submit the application as well as the patient authorization form to the company for the process to be started

## 2018-09-24 ENCOUNTER — TELEPHONE (OUTPATIENT)
Dept: ENDOCRINOLOGY | Facility: HOSPITAL | Age: 63
End: 2018-09-24

## 2018-09-24 NOTE — TELEPHONE ENCOUNTER
Lola Scherer from [employer] says they received patient authorization for patient but did not receive enrollment forms and she is questioning if they were sent also

## 2018-09-25 NOTE — TELEPHONE ENCOUNTER
I don't recall seeing papers for this but I will ask Dr Edith Gaytan tomorrow when he is back in the office if he has seen this paperwork

## 2018-10-08 NOTE — TELEPHONE ENCOUNTER
Someone from [facility] [address] called back and is asking for an enrollment form, did you get this?

## 2018-10-09 ENCOUNTER — TELEPHONE (OUTPATIENT)
Dept: ENDOCRINOLOGY | Facility: HOSPITAL | Age: 63
End: 2018-10-09

## 2018-10-09 NOTE — TELEPHONE ENCOUNTER
The good news is that Lily garcia [place] finally received the pt enrollment forms  The bad news is that the form is missing a date on the top section to the right of the pt's signature  Can you add the date & resend it?

## 2018-10-29 ENCOUNTER — TELEPHONE (OUTPATIENT)
Dept: ENDOCRINOLOGY | Facility: HOSPITAL | Age: 63
End: 2018-10-29

## 2018-10-29 NOTE — TELEPHONE ENCOUNTER
Took a message off the a/m  Did not catch the name or company of the caller  I believe it may have been [employer] based on all their previous calls for enrollment forms  Was requesting the prior auth status  Can call them back at [phone]

## 2018-11-01 NOTE — TELEPHONE ENCOUNTER
Spoke with Lucy stern  We need to call Alicia Bobo to order and do a PA for the medication  I can call today I just need to know the dosage information  Once a PA is done and approved, Lucy will make arrangements for a nurse to come out and administer the mediation for the patient  Please advise

## 2018-11-05 DIAGNOSIS — D35.2 PITUITARY MACROADENOMA (HCC): ICD-10-CM

## 2018-11-05 DIAGNOSIS — E22.0 ACROMEGALY (HCC): ICD-10-CM

## 2018-11-05 DIAGNOSIS — R79.89 ELEVATED INSULIN-LIKE GROWTH FACTOR 1 (IGF-1) LEVEL: ICD-10-CM

## 2018-11-05 DIAGNOSIS — Z09 POSTOPERATIVE EXAMINATION: ICD-10-CM

## 2018-11-06 ENCOUNTER — OFFICE VISIT (OUTPATIENT)
Dept: NEUROSURGERY | Facility: CLINIC | Age: 63
End: 2018-11-06
Payer: COMMERCIAL

## 2018-11-06 VITALS
HEART RATE: 66 BPM | TEMPERATURE: 98.4 F | DIASTOLIC BLOOD PRESSURE: 66 MMHG | SYSTOLIC BLOOD PRESSURE: 120 MMHG | WEIGHT: 167.2 LBS | HEIGHT: 70 IN | BODY MASS INDEX: 23.94 KG/M2 | RESPIRATION RATE: 16 BRPM

## 2018-11-06 DIAGNOSIS — E22.0 ACROMEGALY (HCC): ICD-10-CM

## 2018-11-06 DIAGNOSIS — D35.2 PITUITARY MACROADENOMA (HCC): Primary | ICD-10-CM

## 2018-11-06 PROCEDURE — 99213 OFFICE O/P EST LOW 20 MIN: CPT | Performed by: NEUROLOGICAL SURGERY

## 2018-11-06 NOTE — PROGRESS NOTES
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 which is unresectable surgically  This is the source of his continued high IGF 1     I have recommend endocrine follow-up and treatment which is already scheduled  We will follow his tumor along and if there is any change in the size use stereotactic radio surgery to control tumor growth      CHIEF COMPLAINT  Patient presents for F/U with MRI brain from [facility] s/p Image guided endoscopic transsphenoidal approach for debulking of pituitary macroadenoma, abdominal fat graft harvesting on 4/25/18 (3 months out)      SX INFO  4/25/18 (DKO) Image guided endoscopic transsphenoidal approach for debulking of pituitary macroadenoma, abdominal fat graft harvesting  Final Path Dx: Pituitary adenoma dominated by somatotroph elements with minor lactotroph component, Ki-67 labeling index < 1%, without overexpression of p53        HISTORY OF PRESENT ILLNESS  Doing well  No complaints  His endocrinologist as discussed multiple medication regimens for him  REVIEW OF SYSTEMS  Review of Systems   Constitutional: Negative  HENT: Negative  Eyes: Negative  Respiratory: Negative  Cardiovascular: Negative  Gastrointestinal: Negative  Endocrine: Negative  Genitourinary: Negative  Musculoskeletal: Negative  Skin: Negative  Allergic/Immunologic: Negative  Neurological: Negative  Hematological: Negative  Psychiatric/Behavioral: Negative      All other systems reviewed and are negative        The patient's ROS was reviewed by MD HANDY reviewed the ROS    Active Ambulatory Problems     Diagnosis Date Noted    Hypogonadotropic hypogonadism [facility] ) 03/14/2018    Pituitary macroadenoma ([facility] [facility] ) 03/14/2018    Elevated insulin-like growth factor 1 (IGF-1) level 03/14/2018    Pituitary mass ([facility] ) 04/25/2018    Acromegaly ([facility] ) 12/12/2017    Hyperprolactinemia ([facility] ) 10/31/2017    Hypogonadism male 10/24/2017     Resolved Ambulatory Problems     Diagnosis Date Noted    No Resolved Ambulatory Problems     Past Medical History:   Diagnosis Date    Decreased stamina     Feeling tired     Glaucoma     Lipoma     Lipoma of left shoulder     Muscle weakness     Open-angle glaucoma     Pituitary mass ([facility] )        Past Surgical History:   Procedure Laterality Date    ANKLE SURGERY      lipoma    FINGER TENDON REPAIR      HAND SURGERY      KNEE ARTHROSCOPY      ND NEUROENDOSCOP,EXC,PIT MAKI,TRANSNAS/SPHEN N/A 4/25/2018    Procedure: Image guided endoscopic transsphenoidal approach for debulking of pituitary macroadenoma, abdominal fat graft harvesting;  Surgeon: Mini Light MD;  [facility];  Service: Neurosurgery    SHOULDER SURGERY      lipoma removal       History   Smoking Status    Former Smoker    Types: Cigarettes    Quit date: 1997   Smokeless Tobacco    Never Used     Comment: quit 15 years ago       History   Alcohol Use    Yes     Comment: occasional       History   Drug Use No       Vitals:    11/06/18 1509   BP: 120/66   BP Location: Left arm   Patient Position: Sitting   Cuff Size: Standard   Pulse: 66   Resp: 16   Temp: 98 4 °F (36 9 °C)   TempSrc: Tympanic   Weight: 75 8 kg (167 lb 3 2 oz)   Height: 5' 10" (1 778 m)         Current Outpatient Prescriptions:     Cyanocobalamin (VITAMIN B 12 PO), Take 1 tablet by mouth daily, Disp: , Rfl:     latanoprost (XALATAN) 0 005 % ophthalmic solution, 1 drop daily at bedtime, Disp: , Rfl:    Multiple Vitamins-Minerals (CENTRUM ADULTS PO), Take 1 tablet by mouth daily, Disp: , Rfl:     The following portions of the patient's history were reviewed by MD and updated by MA as appropriate: allergies, current medications, past family history, past medical history, past social history, past surgical history and problem list       Physical Exam   awake alert and oriented   extraocular movements are intact   speech is clear and comprehensible   his power is 5/5 bilaterally   he does not extinguish to double simultaneous stimulation   his station and gait are normal   his Romberg is normal    RESULTS/DATA  MRI of the brain is carefully reviewed and compared with the preoperative study  This demonstrates decompression of the optic chiasm  There is 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

## 2018-11-06 NOTE — LETTER
November 12, 2018     Luz Hilton  [facility] [address]    Patient: Matthew Hernandez   YOB: 1955   Date of Visit: 11/6/2018       Dear Dr Gemma Davis: Thank you for referring Bibi Loza to me for evaluation  Below are my notes for this consultation  If you have questions, please do not hesitate to call me  I look forward to following your patient along with you  Sincerely,        Kari Mcallister MD        CC: DO Kari Montero MD  11/12/2018  6:05 AM  Sign at close encounter  Neurosurgery Office Note  Matthew Hernandez is a 58 y o  male    Type of Visit: Follow-up        Diagnoses and all orders for this visit:    Pituitary macroadenoma ([address] )  -     MRI brain pituitary wo and w contrast; Future    Acromegaly ([address] )  -     MRI brain pituitary wo and w contrast; Future          DISCUSSION SUMMARY   60-year-old male status post a transsphenoidal for a growth hormone secreting tumor     the optic nerves are decompressed and the patient is asymptomatic however  There 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 which is unresectable surgically  This is the source of his continued high IGF 1     I have recommend endocrine follow-up and treatment which is already scheduled  We will follow his tumor along and if there is any change in the size use stereotactic radio surgery to control tumor growth      CHIEF COMPLAINT  Patient presents for F/U with MRI brain from [facility] s/p Image guided endoscopic transsphenoidal approach for debulking of pituitary macroadenoma, abdominal fat graft harvesting on 4/25/18 (3 months out)      SX INFO  4/25/18 (DKO) Image guided endoscopic transsphenoidal approach for debulking of pituitary macroadenoma, abdominal fat graft harvesting    Final Path Dx: Pituitary adenoma dominated by somatotroph elements with minor lactotroph component, Ki-67 labeling index < 1%, without overexpression of p53        HISTORY OF PRESENT ILLNESS  Doing well  No complaints  His endocrinologist as discussed multiple medication regimens for him  REVIEW OF SYSTEMS  Review of Systems   Constitutional: Negative  HENT: Negative  Eyes: Negative  Respiratory: Negative  Cardiovascular: Negative  Gastrointestinal: Negative  Endocrine: Negative  Genitourinary: Negative  Musculoskeletal: Negative  Skin: Negative  Allergic/Immunologic: Negative  Neurological: Negative  Hematological: Negative  Psychiatric/Behavioral: Negative  All other systems reviewed and are negative        The patient's ROS was reviewed by MD HANDY reviewed the ROS    Active Ambulatory Problems     Diagnosis Date Noted    Hypogonadotropic hypogonadism ([facility] ) 03/14/2018    Pituitary macroadenoma ([facility] ) 03/14/2018    Elevated insulin-like growth factor 1 (IGF-1) level 03/14/2018    Pituitary mass ([facility] ) 04/25/2018    Acromegaly ([facility] [address] ) 12/12/2017    Hyperprolactinemia ([facility] [address] [facility] ) 10/31/2017    Hypogonadism male 10/24/2017     Resolved Ambulatory Problems     Diagnosis Date Noted    No Resolved Ambulatory Problems     Past Medical History:   Diagnosis Date    Decreased stamina     Feeling tired     Glaucoma     Lipoma     Lipoma of left shoulder     Muscle weakness     Open-angle glaucoma     Pituitary mass ([facility] [address] [facility] )        Past Surgical History:   Procedure Laterality Date    ANKLE SURGERY      lipoma    FINGER TENDON REPAIR      HAND SURGERY      KNEE ARTHROSCOPY      NE NEUROENDOSCOP,EXC,PIT MAKI,TRANSNAS/SPHEN N/A 4/25/2018    Procedure: Image guided endoscopic transsphenoidal approach for debulking of pituitary macroadenoma, abdominal fat graft harvesting;  Surgeon: Enma Macedo MD;  [facility];  Service: Neurosurgery    SHOULDER SURGERY      lipoma removal       History   Smoking Status    Former Smoker    Types: Cigarettes    Quit date: 1997   Smokeless Tobacco    Never Used     Comment: quit 15 years ago History   Alcohol Use    Yes     Comment: occasional       History   Drug Use No       Vitals:    11/06/18 1509   BP: 120/66   BP Location: Left arm   Patient Position: Sitting   Cuff Size: Standard   Pulse: 66   Resp: 16   Temp: 98 4 °F (36 9 °C)   TempSrc: Tympanic   Weight: 75 8 kg (167 lb 3 2 oz)   Height: 5' 10" (1 778 m)         Current Outpatient Prescriptions:     Cyanocobalamin (VITAMIN B 12 PO), Take 1 tablet by mouth daily, Disp: , Rfl:     latanoprost (XALATAN) 0 005 % ophthalmic solution, 1 drop daily at bedtime, Disp: , Rfl:     Multiple Vitamins-Minerals (CENTRUM ADULTS PO), Take 1 tablet by mouth daily, Disp: , Rfl:     The following portions of the patient's history were reviewed by MD and updated by MA as appropriate: allergies, current medications, past family history, past medical history, past social history, past surgical history and problem list       Physical Exam   awake alert and oriented   extraocular movements are intact   speech is clear and comprehensible   his power is 5/5 bilaterally   he does not extinguish to double simultaneous stimulation   his station and gait are normal   his Romberg is normal    RESULTS/DATA  MRI of the brain is carefully reviewed and compared with the preoperative study  This demonstrates decompression of the optic chiasm  There is 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

## 2018-11-12 ENCOUNTER — TELEPHONE (OUTPATIENT)
Dept: ENDOCRINOLOGY | Facility: HOSPITAL | Age: 63
End: 2018-11-12

## 2018-11-12 NOTE — TELEPHONE ENCOUNTER
Patient said he needs a prior auth for his somatuline depot  He's not sure what any of that means, but he was told he needs it to be done before he get the home care injection  Let him know ([phone])

## 2018-11-13 NOTE — TELEPHONE ENCOUNTER
Somatuline depot prior Mis Nesbitt was approved  Somatuline depot needs to be sent to [facility] to be filled  Once that is sent, I will contact [facility] to update them so they can find a nurse to come out to his house  I did not order the medication, would you like me to? Please advise

## 2018-11-14 DIAGNOSIS — E22.0 ACROMEGALY (HCC): Primary | ICD-10-CM

## 2018-11-14 NOTE — TELEPHONE ENCOUNTER
Contacted patient to let him know the lanreotide depot was approved (Case ID [id number]) and that the medication was ordered  I will reach out to [facility] to update them so they can locate a nurse

## 2018-11-14 NOTE — TELEPHONE ENCOUNTER
Spoke with Za stern, they will start the process to locate a nurse and reach out to the patient  Patient informed of this

## 2018-11-28 DIAGNOSIS — E22.0 ACROMEGALY (HCC): ICD-10-CM

## 2018-11-28 NOTE — TELEPHONE ENCOUNTER
Received call from Janae Petty at [address] [place] [address] did not received the order for the lanreotide, can you please print it  I have the fax number it needs to go to  Thank you  Script needs to be faxed to [phone]

## 2018-12-07 ENCOUNTER — TELEPHONE (OUTPATIENT)
Dept: ENDOCRINOLOGY | Facility: HOSPITAL | Age: 63
End: 2018-12-07

## 2018-12-07 NOTE — TELEPHONE ENCOUNTER
I think that they were going to arrange an urse to administer this to the patient  Can they arrange that at our office?

## 2018-12-07 NOTE — TELEPHONE ENCOUNTER
Received call from Zohra Merino at [facility], he received notification that Somatuline is not able to be filled by a specialty phamacy  The medication can only be a "buy and bill" which would mean we would have to obtain the medication and administer here   Please advise

## 2018-12-07 NOTE — TELEPHONE ENCOUNTER
OK I will CC her on here regarding this  I wonder if they can deliver it here and have the nurse administer it at his home as planned

## 2018-12-18 ENCOUNTER — TELEPHONE (OUTPATIENT)
Dept: ENDOCRINOLOGY | Facility: HOSPITAL | Age: 63
End: 2018-12-18

## 2018-12-18 DIAGNOSIS — E22.0 ACROMEGALY (HCC): ICD-10-CM

## 2018-12-27 NOTE — TELEPHONE ENCOUNTER
Received fax to fill out information in regards to setting up direct ship for the patient's medication  I know you were looking into this  Is this something we are able to do  Please advise

## 2018-12-27 NOTE — TELEPHONE ENCOUNTER
I have telephone calls in to the rep to see how we can help this patient  We do not do a buy and bill  I am not sure if the patient did the paper work for the [address] [place] [address] PAP

## 2019-01-14 ENCOUNTER — TELEPHONE (OUTPATIENT)
Dept: ENDOCRINOLOGY | Facility: CLINIC | Age: 64
End: 2019-01-14

## 2019-01-17 ENCOUNTER — TELEPHONE (OUTPATIENT)
Dept: ENDOCRINOLOGY | Facility: HOSPITAL | Age: 64
End: 2019-01-17

## 2019-01-17 NOTE — TELEPHONE ENCOUNTER
Patient called looking for update on where we are with the somatuline  I told him I would speak with you and someone would return his call

## 2019-01-18 NOTE — TELEPHONE ENCOUNTER
Spoke with Curly Archer, he is emailing a form to set up the direct shipment of the medication  The medication can be shipped to the office and the patient would be responsible for the payment  [facility] would assist the patient in the injections

## 2019-01-18 NOTE — TELEPHONE ENCOUNTER
Spoke with Ibrahima Amor  He is going to reach out to [employer] to see if the medication can be run through the patient's medical benefits so that the patient obtain the medication through a specialty pharmacy  The only other option would be a buy and bill and have the patient use an infusion center to administer the medication

## 2019-01-18 NOTE — TELEPHONE ENCOUNTER
Left message for Eduardo with Za  I need to clarify how we can obtain the medication without doing a buy and bill  The medication can be shipped here but we are not paying for it- Per Codi Sandra  Patient informed we are working on this and I will update him as I receive information

## 2019-01-21 DIAGNOSIS — E22.0 ACROMEGALY (HCC): ICD-10-CM

## 2019-01-21 NOTE — TELEPHONE ENCOUNTER
I am filling out and order for the Lanreotide depot for direct ship  Can you please print a prescription

## 2019-01-21 NOTE — TELEPHONE ENCOUNTER
Spoke with [facility] to set up shipment  [facility] is waiting on the patient to call back to set up shipment  I have reached out to the patient to ask him to reach out to them  I have also provided the patient with a phone number of  [phone] to reach out to [facility] about activating the copay assistance

## 2019-01-28 NOTE — TELEPHONE ENCOUNTER
Hansel Hand from [facility] called  She got consent from the patient to have the Somatuline shipped here  Will be delivered on Friday 2-1-19 by [employer]

## 2019-03-13 ENCOUNTER — TELEPHONE (OUTPATIENT)
Dept: ENDOCRINOLOGY | Facility: HOSPITAL | Age: 64
End: 2019-03-13

## 2019-03-13 NOTE — TELEPHONE ENCOUNTER
Pharmacy called to confirm delivery of Somatuline  Medication will be delivered tomorrow   Phone number to call for any issues [phone]

## 2019-03-14 ENCOUNTER — TELEPHONE (OUTPATIENT)
Dept: ENDOCRINOLOGY | Facility: HOSPITAL | Age: 64
End: 2019-03-14

## 2019-03-14 DIAGNOSIS — E22.0 ACROMEGALY (HCC): Primary | ICD-10-CM

## 2019-03-22 ENCOUNTER — TELEPHONE (OUTPATIENT)
Dept: ENDOCRINOLOGY | Facility: HOSPITAL | Age: 64
End: 2019-03-22

## 2019-03-22 NOTE — TELEPHONE ENCOUNTER
Somatuline [place] Injection Administration Confirmation form from [employer]  Injection completed on 3-16-19

## 2019-04-23 ENCOUNTER — TELEPHONE (OUTPATIENT)
Dept: ENDOCRINOLOGY | Facility: HOSPITAL | Age: 64
End: 2019-04-23

## 2019-05-16 LAB
ALBUMIN SERPL-MCNC: 4.4 G/DL (ref 3.6–4.8)
ALBUMIN/GLOB SERPL: 2 {RATIO} (ref 1.2–2.2)
ALP SERPL-CCNC: 56 IU/L (ref 39–117)
ALT SERPL-CCNC: 20 IU/L (ref 0–44)
AST SERPL-CCNC: 22 IU/L (ref 0–40)
BILIRUB SERPL-MCNC: 0.5 MG/DL (ref 0–1.2)
BUN SERPL-MCNC: 20 MG/DL (ref 8–27)
BUN/CREAT SERPL: 25 (ref 10–24)
CALCIUM SERPL-MCNC: 9.5 MG/DL (ref 8.6–10.2)
CHLORIDE SERPL-SCNC: 101 MMOL/L (ref 96–106)
CO2 SERPL-SCNC: 25 MMOL/L (ref 20–29)
CREAT SERPL-MCNC: 0.8 MG/DL (ref 0.76–1.27)
GH SERPL-MCNC: 2.3 NG/ML (ref 0–10)
GLOBULIN SER-MCNC: 2.2 G/DL (ref 1.5–4.5)
GLUCOSE SERPL-MCNC: 106 MG/DL (ref 65–99)
IGF-I SERPL-MCNC: 233 NG/ML (ref 49–188)
POTASSIUM SERPL-SCNC: 4.6 MMOL/L (ref 3.5–5.2)
PROT SERPL-MCNC: 6.6 G/DL (ref 6–8.5)
SL AMB EGFR AFRICAN AMERICAN: 109 ML/MIN/1.73
SL AMB EGFR NON AFRICAN AMERICAN: 94 ML/MIN/1.73
SODIUM SERPL-SCNC: 139 MMOL/L (ref 134–144)

## 2019-05-17 ENCOUNTER — TELEPHONE (OUTPATIENT)
Dept: ENDOCRINOLOGY | Facility: HOSPITAL | Age: 64
End: 2019-05-17

## 2019-05-22 ENCOUNTER — TELEPHONE (OUTPATIENT)
Dept: ENDOCRINOLOGY | Facility: HOSPITAL | Age: 64
End: 2019-05-22

## 2019-06-05 ENCOUNTER — OFFICE VISIT (OUTPATIENT)
Dept: ENDOCRINOLOGY | Facility: HOSPITAL | Age: 64
End: 2019-06-05
Payer: COMMERCIAL

## 2019-06-05 VITALS
HEART RATE: 72 BPM | WEIGHT: 166.8 LBS | SYSTOLIC BLOOD PRESSURE: 126 MMHG | BODY MASS INDEX: 23.88 KG/M2 | DIASTOLIC BLOOD PRESSURE: 76 MMHG | HEIGHT: 70 IN

## 2019-06-05 DIAGNOSIS — E22.0 ACROMEGALY (HCC): Primary | ICD-10-CM

## 2019-06-05 PROCEDURE — 99214 OFFICE O/P EST MOD 30 MIN: CPT | Performed by: INTERNAL MEDICINE

## 2019-06-05 RX ORDER — CABERGOLINE 0.5 MG/1
TABLET ORAL
Qty: 12 TABLET | Refills: 5 | Status: SHIPPED | OUTPATIENT
Start: 2019-06-05 | End: 2019-09-24 | Stop reason: SDUPTHER

## 2019-06-05 RX ORDER — CABERGOLINE 0.5 MG/1
TABLET ORAL
Qty: 10 TABLET | Refills: 5 | Status: SHIPPED | OUTPATIENT
Start: 2019-06-05 | End: 2019-06-05 | Stop reason: SDUPTHER

## 2019-09-22 LAB
ALBUMIN SERPL-MCNC: 4.3 G/DL (ref 3.6–4.8)
ALBUMIN/GLOB SERPL: 1.7 {RATIO} (ref 1.2–2.2)
ALP SERPL-CCNC: 56 IU/L (ref 39–117)
ALT SERPL-CCNC: 22 IU/L (ref 0–44)
AST SERPL-CCNC: 23 IU/L (ref 0–40)
BASOPHILS # BLD AUTO: 0 X10E3/UL (ref 0–0.2)
BASOPHILS NFR BLD AUTO: 0 %
BILIRUB SERPL-MCNC: 0.6 MG/DL (ref 0–1.2)
BUN SERPL-MCNC: 16 MG/DL (ref 8–27)
BUN/CREAT SERPL: 22 (ref 10–24)
CALCIUM SERPL-MCNC: 10 MG/DL (ref 8.6–10.2)
CHLORIDE SERPL-SCNC: 102 MMOL/L (ref 96–106)
CO2 SERPL-SCNC: 24 MMOL/L (ref 20–29)
CREAT SERPL-MCNC: 0.72 MG/DL (ref 0.76–1.27)
EOSINOPHIL # BLD AUTO: 0.1 X10E3/UL (ref 0–0.4)
EOSINOPHIL NFR BLD AUTO: 3 %
ERYTHROCYTE [DISTWIDTH] IN BLOOD BY AUTOMATED COUNT: 13.5 % (ref 12.3–15.4)
FSH SERPL-ACNC: 2.4 MIU/ML (ref 1.5–12.4)
GH SERPL-MCNC: 8.6 NG/ML (ref 0–10)
GLOBULIN SER-MCNC: 2.5 G/DL (ref 1.5–4.5)
GLUCOSE SERPL-MCNC: 93 MG/DL (ref 65–99)
HCT VFR BLD AUTO: 46 % (ref 37.5–51)
HGB BLD-MCNC: 15.1 G/DL (ref 13–17.7)
IGF-I SERPL-MCNC: 574 NG/ML (ref 49–188)
IMM GRANULOCYTES # BLD: 0 X10E3/UL (ref 0–0.1)
IMM GRANULOCYTES NFR BLD: 0 %
LH SERPL-ACNC: 1.8 MIU/ML (ref 1.7–8.6)
LYMPHOCYTES # BLD AUTO: 1.5 X10E3/UL (ref 0.7–3.1)
LYMPHOCYTES NFR BLD AUTO: 31 %
MCH RBC QN AUTO: 32.3 PG (ref 26.6–33)
MCHC RBC AUTO-ENTMCNC: 32.8 G/DL (ref 31.5–35.7)
MCV RBC AUTO: 99 FL (ref 79–97)
MONOCYTES # BLD AUTO: 0.3 X10E3/UL (ref 0.1–0.9)
MONOCYTES NFR BLD AUTO: 7 %
NEUTROPHILS # BLD AUTO: 2.9 X10E3/UL (ref 1.4–7)
NEUTROPHILS NFR BLD AUTO: 59 %
PLATELET # BLD AUTO: 277 X10E3/UL (ref 150–450)
POTASSIUM SERPL-SCNC: 4.4 MMOL/L (ref 3.5–5.2)
PROLACTIN SERPL-MCNC: 1.7 NG/ML (ref 4–15.2)
PROT SERPL-MCNC: 6.8 G/DL (ref 6–8.5)
RBC # BLD AUTO: 4.67 X10E6/UL (ref 4.14–5.8)
SL AMB EGFR AFRICAN AMERICAN: 113 ML/MIN/1.73
SL AMB EGFR NON AFRICAN AMERICAN: 98 ML/MIN/1.73
SODIUM SERPL-SCNC: 140 MMOL/L (ref 134–144)
TESTOST FREE SERPL-MCNC: 4.6 PG/ML (ref 6.6–18.1)
TESTOST SERPL-MCNC: 159 NG/DL (ref 264–916)
WBC # BLD AUTO: 4.8 X10E3/UL (ref 3.4–10.8)

## 2019-09-24 ENCOUNTER — TELEPHONE (OUTPATIENT)
Dept: ENDOCRINOLOGY | Facility: HOSPITAL | Age: 64
End: 2019-09-24

## 2019-09-24 DIAGNOSIS — E22.0 ACROMEGALY (HCC): ICD-10-CM

## 2019-09-24 RX ORDER — CABERGOLINE 0.5 MG/1
TABLET ORAL
Qty: 24 TABLET | Refills: 5 | Status: SHIPPED | OUTPATIENT
Start: 2019-09-24 | End: 2020-01-17

## 2019-09-25 ENCOUNTER — TELEPHONE (OUTPATIENT)
Dept: ENDOCRINOLOGY | Facility: HOSPITAL | Age: 64
End: 2019-09-25

## 2019-09-25 NOTE — TELEPHONE ENCOUNTER
We have patient on the cancellation list for a follow up appt  He missed his 9-12-19 appt  He didn't reschedule because he only wants to see you & he didn't want to wait until January  Saw that he had his blood work done, but he still has no follow up appt scheduled  When do you want to see him next, and do you want to order any additional labs?

## 2019-09-25 NOTE — TELEPHONE ENCOUNTER
A cancellation would be fine  We have a plan in place per result note from 9/20  He should also reestablish care with neurosurgery per my result note

## 2019-10-31 LAB
ALBUMIN SERPL-MCNC: 4.2 G/DL (ref 3.6–4.8)
ALBUMIN/GLOB SERPL: 1.9 {RATIO} (ref 1.2–2.2)
ALP SERPL-CCNC: 56 IU/L (ref 39–117)
ALT SERPL-CCNC: 30 IU/L (ref 0–44)
AST SERPL-CCNC: 31 IU/L (ref 0–40)
BILIRUB SERPL-MCNC: 0.5 MG/DL (ref 0–1.2)
BUN SERPL-MCNC: 19 MG/DL (ref 8–27)
BUN/CREAT SERPL: 26 (ref 10–24)
CALCIUM SERPL-MCNC: 9.6 MG/DL (ref 8.6–10.2)
CHLORIDE SERPL-SCNC: 102 MMOL/L (ref 96–106)
CO2 SERPL-SCNC: 25 MMOL/L (ref 20–29)
CREAT SERPL-MCNC: 0.73 MG/DL (ref 0.76–1.27)
GH SERPL-MCNC: 9.4 NG/ML (ref 0–10)
GLOBULIN SER-MCNC: 2.2 G/DL (ref 1.5–4.5)
GLUCOSE SERPL-MCNC: 97 MG/DL (ref 65–99)
IGF-I SERPL-MCNC: 543 NG/ML (ref 49–188)
POTASSIUM SERPL-SCNC: 4.1 MMOL/L (ref 3.5–5.2)
PROT SERPL-MCNC: 6.4 G/DL (ref 6–8.5)
SL AMB EGFR AFRICAN AMERICAN: 113 ML/MIN/1.73
SL AMB EGFR NON AFRICAN AMERICAN: 97 ML/MIN/1.73
SODIUM SERPL-SCNC: 140 MMOL/L (ref 134–144)

## 2019-11-05 ENCOUNTER — TELEPHONE (OUTPATIENT)
Dept: NEUROSURGERY | Facility: CLINIC | Age: 64
End: 2019-11-05

## 2019-11-05 NOTE — TELEPHONE ENCOUNTER
Message left by pt reporting he needs an order for the MRI that is needed  Attempted to call on home number left in message but once message machine comes on it disconnects  Left a message on cell informing him order is in chart and will  after tomorrow  Suggests he call central schedule today if able to schedule study  Number provided in message

## 2019-12-02 ENCOUNTER — DOCUMENTATION (OUTPATIENT)
Dept: NEUROSURGERY | Facility: CLINIC | Age: 64
End: 2019-12-02

## 2019-12-02 ENCOUNTER — TELEPHONE (OUTPATIENT)
Dept: NEUROSURGERY | Facility: CLINIC | Age: 64
End: 2019-12-02

## 2019-12-02 NOTE — TELEPHONE ENCOUNTER
Patient has an appointment for tomorrow  At his last OV we ordered an new Mri brain pit  Patient always goes to [facility] due to insurance  I checked the chart and no reports of MRI have been sent to our office  I called patient at [phone] and he stated that he had his MRI done in the beginning of November  I advised him that I will call [facility] and request report to have prior to his appointment

## 2019-12-02 NOTE — TELEPHONE ENCOUNTER
I called [facility] # [id number] and asked for direct number to radiology [phone]  I was transferred and spoke to [facility] who will fax over report of MRI

## 2019-12-03 ENCOUNTER — OFFICE VISIT (OUTPATIENT)
Dept: NEUROSURGERY | Facility: CLINIC | Age: 64
End: 2019-12-03
Payer: COMMERCIAL

## 2019-12-03 VITALS
SYSTOLIC BLOOD PRESSURE: 107 MMHG | WEIGHT: 173 LBS | DIASTOLIC BLOOD PRESSURE: 71 MMHG | HEART RATE: 51 BPM | HEIGHT: 70 IN | RESPIRATION RATE: 16 BRPM | TEMPERATURE: 97.2 F | BODY MASS INDEX: 24.77 KG/M2

## 2019-12-03 DIAGNOSIS — R79.89 ELEVATED INSULIN-LIKE GROWTH FACTOR 1 (IGF-1) LEVEL: ICD-10-CM

## 2019-12-03 DIAGNOSIS — E22.0 ACROMEGALY (HCC): Primary | ICD-10-CM

## 2019-12-03 DIAGNOSIS — D35.2 PITUITARY MACROADENOMA (HCC): ICD-10-CM

## 2019-12-03 PROCEDURE — 99214 OFFICE O/P EST MOD 30 MIN: CPT | Performed by: NEUROLOGICAL SURGERY

## 2019-12-03 RX ORDER — BRIMONIDINE TARTRATE/TIMOLOL 0.2%-0.5%
DROPS OPHTHALMIC (EYE)
Refills: 5 | COMMUNITY
Start: 2019-10-03

## 2019-12-03 NOTE — LETTER
December 3, 2019     Jennifer Bateman  [address]    Patient: Lexy Rodriguez   YOB: 1955   Date of Visit: 12/3/2019       Dear Dr Panfilo Falcon: Thank you for referring Mailesvitlana Andrew to me for evaluation  Below are my notes for this consultation  If you have questions, please do not hesitate to call me  I look forward to following your patient along with you  Sincerely,        Jason Minor MD        CC: DO Jason Adame MD  12/3/2019  6:31 PM  Sign at close encounter  [facility] Office Note  Lexy Rodriguez is a 71 y o  male      Visit Type: Follow-up      Diagnoses and all orders for this visit:    Acromegaly ([facility] [address] )    Pituitary macroadenoma ([facility] [address] )    Elevated insulin-like growth factor 1 (IGF-1) level            DISCUSSION SUMMARY  This is a 59-year-old male with a pituitary macroadenoma and an elevated IGF 1 with acromegalic features  His residual pituitary macroadenoma is stable and does not cause any compression of the overlying optic chiasm currently  We discussed exhausting medication therapy prior to consideration of additional surgical/radiation therapy  Specifically, he is on cabergoline which was increased but there is no repeat lab after 30-90 days on the increased dose  Consideration of Lanreotide (produced tenesmus but decreased IGF1 to 233) retrial with life style modifications, or Octreotide or Pegvisomant  We discussed injectables and his initial reluctance, but given the alternatives he agreed to reconsider all of his options  If there is complete failure/intolerance of medication regimen, then a repeat surgical debulking followed by radiation to the residual would be indicated  Surgery cannot be curative because of the presence of tumor in the cavernous sinus    The logic behind the surgical debulking prior to [facility] [address] falls in minimizing the stereotactic target thereby reducing the radiation effect on the remaining native hormone production  Return in about 6 months (around 6/3/2020)  CHIEF COMPLAINT  Patient presents for 1 year F/U with MRI brain from [address] [place] [address]  4/25/18 ([facility]) Image guided endoscopic transsphenoidal approach for debulking of pituitary macroadenoma, abdominal fat graft harvesting  Final Path Dx: Pituitary adenoma dominated by somatotroph elements with minor lactotroph component, Ki-67 labeling index < 1%, without overexpression of p53    HISTORY OF PRESENT ILLNESS  Patient initially presented with hypogonadism and loss of peripheral vision found to have a pituitary macroadenoma  Patient is status post a transsphenoidal approach for a IGF1 secreting tumor  He denies headaches  His vision has returned to normal  He is a   He denies SOB or sleep apnea  He denies SSCP or dyspnea  He complains of tenesmus associated with the use of  Lanreotide which lasted for the first day but then was completed gone by the second to third day  This medication otherwise was tolerated well  REVIEW OF SYSTEMS  Review of Systems   Constitutional: Negative  HENT: Negative  Eyes: Negative  Respiratory: Negative  Cardiovascular: Negative  Gastrointestinal: Negative  Endocrine: Negative  Genitourinary: Negative  Musculoskeletal: Positive for arthralgias (Occasional pain in the whole right side)  Skin: Negative  Allergic/Immunologic: Negative  Neurological: Negative  Hematological: Negative  Psychiatric/Behavioral: Negative  All other systems reviewed and are negative  I reviewed the ROS    I reviewed the ROS    MEDICAL HISTORY  Active Ambulatory Problems     Diagnosis Date Noted    Hypogonadotropic hypogonadism ([address] ) 03/14/2018    Pituitary macroadenoma ([address] ) 03/14/2018    Elevated insulin-like growth factor 1 (IGF-1) level 03/14/2018    Pituitary mass ([address] ) 04/25/2018    Acromegaly ([address] ) 12/12/2017    Hyperprolactinemia ([facility] [address] ) 10/31/2017    Hypogonadism male 10/24/2017     Resolved Ambulatory Problems     Diagnosis Date Noted    No Resolved Ambulatory Problems     Past Medical History:   Diagnosis Date    Decreased stamina     Feeling tired     Glaucoma     Lipoma     Lipoma of left shoulder     Muscle weakness     Open-angle glaucoma        Past Surgical History:   Procedure Laterality Date    ANKLE SURGERY      lipoma    FINGER TENDON REPAIR      HAND SURGERY      HERNIA REPAIR      KNEE ARTHROSCOPY      OR NEUROENDOSCOP,EXC,PIT MAKI,TRANSNAS/SPHEN N/A 2018    Procedure: Image guided endoscopic transsphenoidal approach for debulking of pituitary macroadenoma, abdominal fat graft harvesting;  Surgeon: Desire Shepherd MD;  [facility];  Service: Neurosurgery    SHOULDER SURGERY      lipoma removal       Social History     Tobacco Use   Smoking Status Former Smoker    Types: Cigarettes    Last attempt to quit:     Years since quittin 9   Smokeless Tobacco Never Used   Tobacco Comment    quit 15 years ago       Social History     Substance and Sexual Activity   Alcohol Use Yes    Comment: occasional       Social History     Substance and Sexual Activity   Drug Use No       Vitals:    19 1005   BP: 107/71   BP Location: Right arm   Patient Position: Sitting   Cuff Size: Standard   Pulse: (!) 51   Resp: 16   Temp: (!) 97 2 °F (36 2 °C)   TempSrc: Tympanic   Weight: 78 5 kg (173 lb)   Height: 5' 10" (1 778 m)         Current Outpatient Medications:     cabergoline (DOSTINEX) 0 5 MG tablet, 1 tab twice a week , Disp: 24 tablet, Rfl: 5    COMBIGAN 0 2-0 5 %, INSTILL 1 DROP INTO LEFT EYE TWICE DAILY, Disp: , Rfl: 5    Cyanocobalamin (VITAMIN B 12 PO), Take 1 tablet by mouth daily, Disp: , Rfl:     latanoprost (XALATAN) 0 005 % ophthalmic solution, 1 drop daily at bedtime, Disp: , Rfl:     Multiple Vitamins-Minerals (CENTRUM ADULTS PO), Take 1 tablet by mouth daily, Disp: , Rfl:      No Known Allergies     The following portions of the patient's history were updated by MA and reviewed by MD: allergies, current medications, past family history, past medical history, past social history, past surgical history and problem list       Physical Exam  Awake alert and oriented  Extraocular movements are intact  Face is symmetric in grimace and at rest  The nose is large  His hands are large with accentuated knuckles  Pupils are equal round reactive to light and accommodate  Speech and mentation are clear  Power in the upper extremities is 5/5  His station and gait are normal  He has no drift  Finger-to-nose testing demonstrates some tremulousness with the right being slightly worse than the left  There is no past pointing   His station and gait are normal    RESULTS/DATA  An MRI of the brain from 04/19/2018 is compared with a study from 09/17/2018 and 11/12/2019  The later study demonstrates decompression of the optic chiasm and distortion of the pituitary stalk to the right  The medial wall of the left cavernous sinus cannot be clearly identified and likely represents the separation between the native tumor and the residual mass on the left            Growth hormone is 8 8   IGF 1 is 549 on 05/14 it was 233 (normal is )

## 2019-12-03 NOTE — PROGRESS NOTES
[facility] Office Note  Nargis Vera is a 71 y o  male      Visit Type: Follow-up      Diagnoses and all orders for this visit:    Acromegaly ([address] )    Pituitary macroadenoma ([address] )    Elevated insulin-like growth factor 1 (IGF-1) level            DISCUSSION SUMMARY  This is a 57-year-old male with a pituitary macroadenoma and an elevated IGF 1 with acromegalic features  His residual pituitary macroadenoma is stable and does not cause any compression of the overlying optic chiasm currently  We discussed exhausting medication therapy prior to consideration of additional surgical/radiation therapy  Specifically, he is on cabergoline which was increased but there is no repeat lab after 30-90 days on the increased dose  Consideration of Lanreotide (produced tenesmus but decreased IGF1 to 233) retrial with life style modifications, or Octreotide or Pegvisomant  We discussed injectables and his initial reluctance, but given the alternatives he agreed to reconsider all of his options  If there is complete failure/intolerance of medication regimen, then a repeat surgical debulking followed by radiation to the residual would be indicated  Surgery cannot be curative because of the presence of tumor in the cavernous sinus  The logic behind the surgical debulking prior to [address] falls in minimizing the stereotactic target thereby reducing the radiation effect on the remaining native hormone production  Return in about 6 months (around 6/3/2020)  CHIEF COMPLAINT  Patient presents for 1 year F/U with MRI brain from [address] [place] [address]  4/25/18 ([facility]) Image guided endoscopic transsphenoidal approach for debulking of pituitary macroadenoma, abdominal fat graft harvesting    Final Path Dx: Pituitary adenoma dominated by somatotroph elements with minor lactotroph component, Ki-67 labeling index < 1%, without overexpression of p53    HISTORY OF PRESENT ILLNESS  Patient initially presented with hypogonadism and loss of peripheral vision found to have a pituitary macroadenoma  Patient is status post a transsphenoidal approach for a IGF1 secreting tumor  He denies headaches  His vision has returned to normal  He is a   He denies SOB or sleep apnea  He denies SSCP or dyspnea  He complains of tenesmus associated with the use of  Lanreotide which lasted for the first day but then was completed gone by the second to third day  This medication otherwise was tolerated well  REVIEW OF SYSTEMS  Review of Systems   Constitutional: Negative  HENT: Negative  Eyes: Negative  Respiratory: Negative  Cardiovascular: Negative  Gastrointestinal: Negative  Endocrine: Negative  Genitourinary: Negative  Musculoskeletal: Positive for arthralgias (Occasional pain in the whole right side)  Skin: Negative  Allergic/Immunologic: Negative  Neurological: Negative  Hematological: Negative  Psychiatric/Behavioral: Negative  All other systems reviewed and are negative  I reviewed the ROS    I reviewed the ROS    MEDICAL HISTORY  Active Ambulatory Problems     Diagnosis Date Noted    Hypogonadotropic hypogonadism ([facility] ) 03/14/2018    Pituitary macroadenoma ([facility] ) 03/14/2018    Elevated insulin-like growth factor 1 (IGF-1) level 03/14/2018    Pituitary mass ([facility] [address] ) 04/25/2018    [facility] 12/12/2017    Hyperprolactinemia ([facility] ) 10/31/2017    Hypogonadism male 10/24/2017     Resolved Ambulatory Problems     Diagnosis Date Noted    No Resolved Ambulatory Problems     Past Medical History:   Diagnosis Date    Decreased stamina     Feeling tired     Glaucoma     Lipoma     Lipoma of left shoulder     Muscle weakness     Open-angle glaucoma        Past Surgical History:   Procedure Laterality Date    ANKLE SURGERY      lipoma    FINGER TENDON REPAIR      HAND SURGERY      HERNIA REPAIR      KNEE ARTHROSCOPY      [place] NEUROENDOSCOP,EXC,PIT MAKI,TRANSNAS/SPHEN N/A 2018    Procedure: Image guided endoscopic transsphenoidal approach for debulking of pituitary macroadenoma, abdominal fat graft harvesting;  Surgeon: Melvin Martin MD;  [facility];  Service: Neurosurgery    SHOULDER SURGERY      lipoma removal       Social History     Tobacco Use   Smoking Status Former Smoker    Types: Cigarettes    Last attempt to quit:     Years since quittin 9   Smokeless Tobacco Never Used   Tobacco Comment    quit 15 years ago       Social History     Substance and Sexual Activity   Alcohol Use Yes    Comment: occasional       Social History     Substance and Sexual Activity   Drug Use No       Vitals:    19 1005   BP: 107/71   BP Location: Right arm   Patient Position: Sitting   Cuff Size: Standard   Pulse: (!) 51   Resp: 16   Temp: (!) 97 2 °F (36 2 °C)   TempSrc: Tympanic   Weight: 78 5 kg (173 lb)   Height: 5' 10" (1 778 m)         Current Outpatient Medications:     cabergoline (DOSTINEX) 0 5 MG tablet, 1 tab twice a week , Disp: 24 tablet, Rfl: 5    COMBIGAN 0 2-0 5 %, INSTILL 1 DROP INTO LEFT EYE TWICE DAILY, Disp: , Rfl: 5    Cyanocobalamin (VITAMIN B 12 PO), Take 1 tablet by mouth daily, Disp: , Rfl:     latanoprost (XALATAN) 0 005 % ophthalmic solution, 1 drop daily at bedtime, Disp: , Rfl:     Multiple Vitamins-Minerals (CENTRUM ADULTS PO), Take 1 tablet by mouth daily, Disp: , Rfl:      No Known Allergies     The following portions of the patient's history were updated by MA and reviewed by MD: allergies, current medications, past family history, past medical history, past social history, past surgical history and problem list       Physical Exam  Awake alert and oriented  Extraocular movements are intact  Face is symmetric in grimace and at rest  The nose is large  His hands are large with accentuated knuckles  Pupils are equal round reactive to light and accommodate  Speech and mentation are clear  Power in the upper extremities is 5/5  His station and gait are normal  He has no drift  Finger-to-nose testing demonstrates some tremulousness with the right being slightly worse than the left  There is no past pointing   His station and gait are normal    RESULTS/DATA  An MRI of the brain from 04/19/2018 is compared with a study from 09/17/2018 and 11/12/2019  The later study demonstrates decompression of the optic chiasm and distortion of the pituitary stalk to the right  The medial wall of the left cavernous sinus cannot be clearly identified and likely represents the separation between the native tumor and the residual mass on the left            Growth hormone is 8 8   IGF 1 is 549 on 05/14 it was 233 (normal is )

## 2019-12-03 NOTE — PROGRESS NOTES
Patient brought disc from [facility] to appointment today containin19-MRI head combined  All images uploaded and disc handed back to patient

## 2019-12-18 ENCOUNTER — TELEPHONE (OUTPATIENT)
Dept: ENDOCRINOLOGY | Facility: HOSPITAL | Age: 64
End: 2019-12-18

## 2019-12-18 NOTE — TELEPHONE ENCOUNTER
Pt has pending 1/22 appt  He said he wants sooner appt  He was taking medication ordered by you (he believes cabergoline) and he was having a bad reaction so he stopped taking it last Friday  Dr Angela Bautista ordered MRI which he had done 11/12, Dr Preeti West said looks fine but he wants you to look at results  He was not having these med reactions at the time of MRI  He wants sooner appt but can only come in the PM due to work  He is going on ski trip in a month and wants to feel better by then  I explained there is no pm availability at this point until February  Please advise

## 2019-12-19 NOTE — TELEPHONE ENCOUNTER
He can either be seen as scheduled or if a cancellation opens up  The other option would be to discuss over the phone  There is only one other medication option which is a daily injection

## 2019-12-19 NOTE — TELEPHONE ENCOUNTER
Pt said he will just come in for appt but then realized he will be away the week of 1/19 so he requested I cancel appt and put him on cancellation list

## 2020-01-17 ENCOUNTER — OFFICE VISIT (OUTPATIENT)
Dept: ENDOCRINOLOGY | Facility: HOSPITAL | Age: 65
End: 2020-01-17
Payer: COMMERCIAL

## 2020-01-17 VITALS
BODY MASS INDEX: 25.08 KG/M2 | HEART RATE: 60 BPM | HEIGHT: 70 IN | WEIGHT: 175.2 LBS | DIASTOLIC BLOOD PRESSURE: 78 MMHG | SYSTOLIC BLOOD PRESSURE: 132 MMHG

## 2020-01-17 DIAGNOSIS — D35.2 PITUITARY MACROADENOMA (HCC): Primary | ICD-10-CM

## 2020-01-17 DIAGNOSIS — E22.0 ACROMEGALY (HCC): ICD-10-CM

## 2020-01-17 PROCEDURE — 99214 OFFICE O/P EST MOD 30 MIN: CPT | Performed by: INTERNAL MEDICINE

## 2020-01-17 NOTE — PROGRESS NOTES
1/17/2020    Assessment/Plan      Diagnoses and all orders for this visit:    Pituitary macroadenoma ([facility] [address] )    Acromegaly ([facility] [address] )        Assessment/Plan:  Acromegaly:  He has met with Neurosurgery last month  Discussed that I agree we should exhaust all medication options before considering a repeat surgery verses radiation approaches  Dopamine agonist therapy on its own will not be effective  He did have a nice response to somatostatin receptor lag aunts  After further discussion, the side effect of loose stools that occurred 2 or 3 days after an injection was not too bothersome and he would be willing to reconsider a somatostatin receptor analog  Will look into octreotide in see if we can start him on the monthly injection and repeat labs in about 2-3 months  I discussed that another option is growth hormone antagonist, pegvisomant which is a daily injection and has the highest efficacy in terms of IGF-1 normalization  He would like to try the monthly octreotide injections for now  We will be in touch with him as we go through the process  Follow-up in 3 or 4 months  CC:  Follow-up    History of Present Illness     HPI: Kalen Jacques is a 71y o  year old male with history of acromegaly that was diagnosed during 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 for acromegaly  He had residual disease both structurally on MRI as well as biochemically  He was started on lanreotide 90 mg every 4 weeks  He did receive 3 injections of this but suffered loose stools and abdominal cramping and as result in inguinal hernia and therefore discontinue this medication  At his last appointment with me, we started cabergoline as a bridge to Neurosurgery for evaluation given residual disease as he was not excited to start another injectable type medication  He met with Neurosurgery on 12/03/2019    At that time, MRI was performed prior to that appointment showing his pituitary macroadenoma was stable  It was not causing any compression of the optic chiasm  At his last visit, I asked him to discussed with his PCP regarding screening colonoscopy  He presents today having discontinued cabergoline as he noted joint aches when he increased his dose  These joint aches have been resolving  Review of Systems   Constitutional: Negative for fatigue  HENT: Negative for trouble swallowing and voice change  Eyes: Negative for visual disturbance  Respiratory: Negative for shortness of breath  Cardiovascular: Negative for palpitations and leg swelling  Gastrointestinal: Negative for abdominal pain, nausea and vomiting  Endocrine: Negative for polydipsia and polyuria  Musculoskeletal: Negative for arthralgias and myalgias  Skin: Negative for rash  Neurological: Negative for dizziness, tremors and weakness  Hematological: Negative for adenopathy  Psychiatric/Behavioral: Negative for agitation and confusion         Historical Information   Past Medical History:   Diagnosis Date    Decreased stamina     Feeling tired     Glaucoma     right eye    Lipoma     Lipoma of left shoulder     Muscle weakness     Open-angle glaucoma     left eye severe    Pituitary mass (HCC)      Past Surgical History:   Procedure Laterality Date    ANKLE SURGERY      lipoma    FINGER TENDON REPAIR      HAND SURGERY      HERNIA REPAIR      KNEE ARTHROSCOPY      [place] NEUROENDOSCOP,EXC,PIT MAKI,TRANSNAS/SPHEN N/A 4/25/2018    Procedure: Image guided endoscopic transsphenoidal approach for debulking of pituitary macroadenoma, abdominal fat graft harvesting;  Surgeon: Cristy Duran MD;  [facility];  Service: Neurosurgery    SHOULDER SURGERY      lipoma removal     Social History   Social History     Substance and Sexual Activity   Alcohol Use Yes    Comment: occasional     Social History     Substance and Sexual Activity   Drug Use No     Social History     Tobacco Use   Smoking Status Former Smoker    Types: Cigarettes    Last attempt to quit:     Years since quittin 0   Smokeless Tobacco Never Used   Tobacco Comment    quit 15 years ago     Family History:   Family History   Problem Relation Age of Onset    No Known Problems Mother     Cancer Father        Meds/Allergies   Current Outpatient Medications   Medication Sig Dispense Refill    COMBIGAN 0 2-0 5 % INSTILL 1 DROP INTO LEFT EYE TWICE DAILY  5    Cyanocobalamin (VITAMIN B 12 PO) Take 1 tablet by mouth daily      latanoprost (XALATAN) 0 005 % ophthalmic solution 1 drop daily at bedtime      Multiple Vitamins-Minerals (CENTRUM ADULTS PO) Take 1 tablet by mouth daily      cabergoline (DOSTINEX) 0 5 MG tablet 1 tab twice a week  (Patient not taking: Reported on 2020) 24 tablet 5     No current facility-administered medications for this visit  No Known Allergies    Objective   Vitals: There were no vitals taken for this visit  Invasive Devices     None                 Physical Exam   Constitutional: He is oriented to person, place, and time  He appears well-developed and well-nourished  No distress  HENT:   Head: Normocephalic and atraumatic  Neck: Normal range of motion  Neck supple  No thyromegaly present  Cardiovascular: Normal rate and regular rhythm  Pulmonary/Chest: Effort normal and breath sounds normal  No respiratory distress  Abdominal: Soft  Bowel sounds are normal    Musculoskeletal: Normal range of motion  He exhibits no edema  Neurological: He is alert and oriented to person, place, and time  He exhibits normal muscle tone  Skin: Skin is warm and dry  No rash noted  He is not diaphoretic  Psychiatric: He has a normal mood and affect  His behavior is normal    Vitals reviewed  The history was obtained from the review of the chart and from the patient      Lab Results:      Recent Results (from the past 94435 hour(s))   Insulin-like growth factor 1 (IGF-1) - Lab Collect    Collection Time: 05/14/19  7:09 AM   Result Value Ref Range    Insulin-Like GF-1 233 (H) 49 - 188 ng/mL   Comprehensive metabolic panel Lab Collect    Collection Time: 05/14/19  7:09 AM   Result Value Ref Range    Glucose, Random 106 (H) 65 - 99 mg/dL    BUN 20 8 - 27 mg/dL    Creatinine 0 80 0 76 - 1 27 mg/dL    eGFR Non  94 >59 mL/min/1 73    eGFR  109 >59 mL/min/1 73    SL AMB BUN/CREATININE RATIO 25 (H) 10 - 24    Sodium 139 134 - 144 mmol/L    Potassium 4 6 3 5 - 5 2 mmol/L    Chloride 101 96 - 106 mmol/L    CO2 25 20 - 29 mmol/L    CALCIUM 9 5 8 6 - 10 2 mg/dL    Protein, Total 6 6 6 0 - 8 5 g/dL    Albumin 4 4 3 6 - 4 8 g/dL    Globulin, Total 2 2 1 5 - 4 5 g/dL    Albumin/Globulin Ratio 2 0 1 2 - 2 2    TOTAL BILIRUBIN 0 5 0 0 - 1 2 mg/dL    Alk Phos Isoenzymes 56 39 - 117 IU/L    AST 22 0 - 40 IU/L    ALT 20 0 - 44 IU/L   Growth hormone    Collection Time: 05/14/19  7:09 AM   Result Value Ref Range    Growth Hormone 2 3 0 0 - 10 0 ng/mL   Insulin-like growth factor 1 (IGF-1) - Lab Collect    Collection Time: 09/20/19  6:57 AM   Result Value Ref Range    Insulin-Like GF-1 574 (H) 49 - 188 ng/mL   Comprehensive metabolic panel Lab Collect    Collection Time: 09/20/19  6:57 AM   Result Value Ref Range    Glucose, Random 93 65 - 99 mg/dL    BUN 16 8 - 27 mg/dL    Creatinine 0 72 (L) 0 76 - 1 27 mg/dL    eGFR Non  98 >59 mL/min/1 73    eGFR  113 >59 mL/min/1 73    SL AMB BUN/CREATININE RATIO 22 10 - 24    Sodium 140 134 - 144 mmol/L    Potassium 4 4 3 5 - 5 2 mmol/L    Chloride 102 96 - 106 mmol/L    CO2 24 20 - 29 mmol/L    CALCIUM 10 0 8 6 - 10 2 mg/dL    Protein, Total 6 8 6 0 - 8 5 g/dL    Albumin 4 3 3 6 - 4 8 g/dL    Globulin, Total 2 5 1 5 - 4 5 g/dL    Albumin/Globulin Ratio 1 7 1 2 - 2 2    TOTAL BILIRUBIN 0 6 0 0 - 1 2 mg/dL    Alk Phos Isoenzymes 56 39 - 117 IU/L    AST 23 0 - 40 IU/L    ALT 22 0 - 44 IU/L   Growth hormone    Collection Time: 09/20/19  6:57 AM   Result Value Ref Range    Growth Hormone 8 6 0 0 - 10 0 ng/mL   Prolactin Lab Collect    Collection Time: 09/20/19  6:57 AM   Result Value Ref Range    Prolactin 1 7 (L) 4 0 - 77 1 ng/mL   Follicle stimulating hormone Lab Collect    Collection Time: 09/20/19  6:57 AM   Result Value Ref Range    FSH 2 4 1 5 - 12 4 mIU/mL   Luteinizing hormone Lab Collect    Collection Time: 09/20/19  6:57 AM   Result Value Ref Range    Luteinizing Hormone (LH) 1 8 1 7 - 8 6 mIU/mL   Testosterone, free, total Lab Collect    Collection Time: 09/20/19  6:57 AM   Result Value Ref Range    TESTOSTERONE TOTAL 159 (L) 264 - 916 ng/dL    Testosterone, Free 4 6 (L) 6 6 - 18 1 pg/mL   CBC and differential- Lab Collect    Collection Time: 09/20/19  6:57 AM   Result Value Ref Range    White Blood Cell Count 4 8 3 4 - 10 8 x10E3/uL    Red Blood Cell Count 4 67 4 14 - 5 80 x10E6/uL    Hemoglobin 15 1 13 0 - 17 7 g/dL    HCT 46 0 37 5 - 51 0 %    MCV 99 (H) 79 - 97 fL    MCH 32 3 26 6 - 33 0 pg    MCHC 32 8 31 5 - 35 7 g/dL    RDW 13 5 12 3 - 15 4 %    Platelet Count 823 700 - 450 x10E3/uL    Neutrophils 59 Not Estab  %    Lymphocytes 31 Not Estab  %    Monocytes 7 Not Estab  %    Eosinophils 3 Not Estab  %    Basophils PCT 0 Not Estab  %    Neutrophils (Absolute) 2 9 1 4 - 7 0 x10E3/uL    Lymphocytes (Absolute) 1 5 0 7 - 3 1 x10E3/uL    Monocytes (Absolute) 0 3 0 1 - 0 9 x10E3/uL    Eosinophils (Absolute) 0 1 0 0 - 0 4 x10E3/uL    Basophils ABS 0 0 0 0 - 0 2 x10E3/uL    Immature Granulocytes 0 Not Estab  %    Immature Granulocytes (Absolute) 0 0 0 0 - 0 1 x10E3/uL   Comprehensive metabolic panel    Collection Time: 10/29/19  7:21 AM   Result Value Ref Range    Glucose, Random 97 65 - 99 mg/dL    BUN 19 8 - 27 mg/dL    Creatinine 0 73 (L) 0 76 - 1 27 mg/dL    eGFR Non African American 97 >59 mL/min/1 73    eGFR  113 >59 mL/min/1 73    SL AMB BUN/CREATININE RATIO 26 (H) 10 - 24    Sodium 140 134 - 144 mmol/L    Potassium 4 1 3 5 - 5 2 mmol/L    Chloride 102 96 - 106 mmol/L    CO2 25 20 - 29 mmol/L    CALCIUM 9 6 8 6 - 10 2 mg/dL    Protein, Total 6 4 6 0 - 8 5 g/dL    Albumin 4 2 3 6 - 4 8 g/dL    Globulin, Total 2 2 1 5 - 4 5 g/dL    Albumin/Globulin Ratio 1 9 1 2 - 2 2    TOTAL BILIRUBIN 0 5 0 0 - 1 2 mg/dL    Alk Phos Isoenzymes 56 39 - 117 IU/L    AST 31 0 - 40 IU/L    ALT 30 0 - 44 IU/L   Insulin-like growth factor 1 (IGF-1)    Collection Time: 10/29/19  7:21 AM   Result Value Ref Range    Insulin-Like GF-1 543 (H) 49 - 188 ng/mL   Growth hormone    Collection Time: 10/29/19  7:21 AM   Result Value Ref Range    Growth Hormone 9 4 0 0 - 10 0 ng/mL         Future Appointments   Date Time Provider Trell Hoodi   1/17/2020  7:50 AM DO DANK [facility]       Portions of the record may have been created with voice recognition software  Occasional wrong word or "sound a like" substitutions may have occurred due to the inherent limitations of voice recognition software  Read the chart carefully and recognize, using context, where substitutions have occurred

## 2020-01-22 ENCOUNTER — DOCUMENTATION (OUTPATIENT)
Dept: ENDOCRINOLOGY | Facility: HOSPITAL | Age: 65
End: 2020-01-22

## 2020-01-22 NOTE — PROGRESS NOTES
Submitted patient assistance forms, along with demographics sheet and copy of insurance cards   Faxed to [phone]

## 2020-02-05 NOTE — PROGRESS NOTES
Received notification that patient needs to complete and submit the Patient [facility] service request form   Patient informed and emailed form to patient to complete

## 2020-02-07 NOTE — PROGRESS NOTES
Received a phone call from patient assistance, ([phone]) they note that they are missing the patient service request form which can be completed on [email or web address]

## 2020-02-13 ENCOUNTER — TELEPHONE (OUTPATIENT)
Dept: ENDOCRINOLOGY | Facility: HOSPITAL | Age: 65
End: 2020-02-13

## 2020-02-13 NOTE — TELEPHONE ENCOUNTER
Pt calling to question if we received patient assistance form  I explained we did but financial section was cut off from what we received  Pt will refax to our direct fax number

## 2020-03-16 ENCOUNTER — TELEPHONE (OUTPATIENT)
Dept: ENDOCRINOLOGY | Facility: HOSPITAL | Age: 65
End: 2020-03-16

## 2020-03-16 NOTE — TELEPHONE ENCOUNTER
Received Investigation Request Status update form from [employer]  Patient informed

## 2020-03-17 ENCOUNTER — TELEPHONE (OUTPATIENT)
Dept: ENDOCRINOLOGY | Facility: HOSPITAL | Age: 65
End: 2020-03-17

## 2020-03-17 NOTE — TELEPHONE ENCOUNTER
Received fax that Sandostatin LAR [place] is covered but requires prior auth  Called patient's insurance, they state the medication does NOT require prior auth  Made attempt to reach [facility] at [employer] at [phone]    Unable to reach  Need to clarify what specialty pharmacy to use for the medication

## 2020-04-08 NOTE — TELEPHONE ENCOUNTER
Received call back from [place] [phone]  Was able to reach [facility] but was disconnected  Will try to reach her again

## 2020-04-15 NOTE — TELEPHONE ENCOUNTER
Spoke with Lillard Lesches at [employer]  Because the patient would be using his pharmacy benefits for the Sandostatin LAR depot, he needs to reach out to [employer] at [phone] to give verbal authorization for the patient assistance to complete the benefits investigation  Patient informed  He will call us back once this is done  At that point I will reach out to patient assistance to ask them to complete the process       Phone number for patient assistance is [phone]

## 2020-04-16 NOTE — TELEPHONE ENCOUNTER
Received fax that Sandostatin LAR depot does not require prior auth   Faxed document to [employer] at [phone]

## 2020-04-17 ENCOUNTER — TELEPHONE (OUTPATIENT)
Dept: OTHER | Facility: OTHER | Age: 65
End: 2020-04-17

## 2020-04-20 ENCOUNTER — TELEPHONE (OUTPATIENT)
Dept: ENDOCRINOLOGY | Facility: HOSPITAL | Age: 65
End: 2020-04-20

## 2020-04-21 NOTE — TELEPHONE ENCOUNTER
[employer] called for additional information on this authorization  They can be reached at [phone]  They noted that we need to call before the 30th of this month or return the fax that was sent on 4/20  Jackelyn Miranda      Reference number [id number]

## 2020-04-22 ENCOUNTER — DOCUMENTATION (OUTPATIENT)
Dept: ENDOCRINOLOGY | Facility: HOSPITAL | Age: 65
End: 2020-04-22

## 2020-04-22 NOTE — TELEPHONE ENCOUNTER
Linda Hart left a message asking for a billing preference either medical or part D    Do you know anything about this?  [phone] [id number]

## 2020-04-30 DIAGNOSIS — E22.0 ACROMEGALY (HCC): Primary | ICD-10-CM

## 2020-05-27 ENCOUNTER — TELEPHONE (OUTPATIENT)
Dept: ENDOCRINOLOGY | Facility: HOSPITAL | Age: 65
End: 2020-05-27

## 2020-06-12 ENCOUNTER — TELEPHONE (OUTPATIENT)
Dept: ENDOCRINOLOGY | Facility: HOSPITAL | Age: 65
End: 2020-06-12

## 2020-06-12 DIAGNOSIS — E22.0 ACROMEGALY (HCC): Primary | ICD-10-CM

## 2020-06-20 LAB
ALBUMIN SERPL-MCNC: 4.3 G/DL (ref 3.8–4.8)
ALBUMIN/GLOB SERPL: 2 {RATIO} (ref 1.2–2.2)
ALP SERPL-CCNC: 66 IU/L (ref 39–117)
ALT SERPL-CCNC: 39 IU/L (ref 0–44)
AST SERPL-CCNC: 31 IU/L (ref 0–40)
BILIRUB SERPL-MCNC: 0.6 MG/DL (ref 0–1.2)
BUN SERPL-MCNC: 16 MG/DL (ref 8–27)
BUN/CREAT SERPL: 24 (ref 10–24)
CALCIUM SERPL-MCNC: 9.3 MG/DL (ref 8.6–10.2)
CHLORIDE SERPL-SCNC: 102 MMOL/L (ref 96–106)
CO2 SERPL-SCNC: 24 MMOL/L (ref 20–29)
CREAT SERPL-MCNC: 0.68 MG/DL (ref 0.76–1.27)
GH SERPL-MCNC: 8.7 NG/ML (ref 0–10)
GLOBULIN SER-MCNC: 2.1 G/DL (ref 1.5–4.5)
GLUCOSE SERPL-MCNC: 99 MG/DL (ref 65–99)
IGF-I SERPL-MCNC: 450 NG/ML (ref 49–188)
POTASSIUM SERPL-SCNC: 4 MMOL/L (ref 3.5–5.2)
PROT SERPL-MCNC: 6.4 G/DL (ref 6–8.5)
SL AMB EGFR AFRICAN AMERICAN: 116 ML/MIN/1.73
SL AMB EGFR NON AFRICAN AMERICAN: 100 ML/MIN/1.73
SODIUM SERPL-SCNC: 140 MMOL/L (ref 134–144)

## 2020-06-23 ENCOUNTER — TELEPHONE (OUTPATIENT)
Dept: ENDOCRINOLOGY | Facility: HOSPITAL | Age: 65
End: 2020-06-23

## 2020-07-27 ENCOUNTER — DOCUMENTATION (OUTPATIENT)
Dept: ENDOCRINOLOGY | Facility: HOSPITAL | Age: 65
End: 2020-07-27

## 2020-09-01 ENCOUNTER — TELEPHONE (OUTPATIENT)
Dept: ENDOCRINOLOGY | Facility: HOSPITAL | Age: 65
End: 2020-09-01

## 2020-09-16 DIAGNOSIS — E23.0 HYPOGONADOTROPIC HYPOGONADISM (HCC): ICD-10-CM

## 2020-09-16 DIAGNOSIS — E22.0 ACROMEGALY (HCC): Primary | ICD-10-CM

## 2020-09-16 DIAGNOSIS — D35.2 PITUITARY MACROADENOMA (HCC): ICD-10-CM

## 2020-09-16 LAB
ALBUMIN SERPL-MCNC: 4.2 G/DL (ref 3.8–4.8)
ALBUMIN/GLOB SERPL: 1.8 {RATIO} (ref 1.2–2.2)
ALP SERPL-CCNC: 54 IU/L (ref 39–117)
ALT SERPL-CCNC: 37 IU/L (ref 0–44)
AST SERPL-CCNC: 38 IU/L (ref 0–40)
BILIRUB SERPL-MCNC: 0.6 MG/DL (ref 0–1.2)
BUN SERPL-MCNC: 17 MG/DL (ref 8–27)
BUN/CREAT SERPL: 24 (ref 10–24)
CALCIUM SERPL-MCNC: 9.4 MG/DL (ref 8.6–10.2)
CHLORIDE SERPL-SCNC: 102 MMOL/L (ref 96–106)
CO2 SERPL-SCNC: 24 MMOL/L (ref 20–29)
CREAT SERPL-MCNC: 0.71 MG/DL (ref 0.76–1.27)
GH SERPL-MCNC: 3.5 NG/ML (ref 0–10)
GLOBULIN SER-MCNC: 2.3 G/DL (ref 1.5–4.5)
GLUCOSE SERPL-MCNC: 109 MG/DL (ref 65–99)
IGF-I SERPL-MCNC: 207 NG/ML (ref 59–230)
POTASSIUM SERPL-SCNC: 4.4 MMOL/L (ref 3.5–5.2)
PROT SERPL-MCNC: 6.5 G/DL (ref 6–8.5)
SL AMB EGFR AFRICAN AMERICAN: 113 ML/MIN/1.73
SL AMB EGFR NON AFRICAN AMERICAN: 98 ML/MIN/1.73
SODIUM SERPL-SCNC: 138 MMOL/L (ref 134–144)

## 2020-09-16 RX ORDER — OCTREOTIDE ACETATE,MI-SPHERES 20 MG
20 VIAL (EA) INTRAMUSCULAR
Qty: 3 KIT | Refills: 3 | Status: SHIPPED | OUTPATIENT
Start: 2020-09-16

## 2020-09-25 ENCOUNTER — TELEPHONE (OUTPATIENT)
Dept: ENDOCRINOLOGY | Facility: HOSPITAL | Age: 65
End: 2020-09-25

## 2020-09-25 NOTE — TELEPHONE ENCOUNTER
Received call from patient  He was inquiring on how to renew his Sandostatin  LAR  I spoke with the [employer] patient assistance program  They stated the patient just needs to call in to re order  Patient was informed to call [phone]

## 2020-10-05 ENCOUNTER — DOCUMENTATION (OUTPATIENT)
Dept: ENDOCRINOLOGY | Facility: HOSPITAL | Age: 65
End: 2020-10-05

## 2020-11-05 ENCOUNTER — DOCUMENTATION (OUTPATIENT)
Dept: ENDOCRINOLOGY | Facility: HOSPITAL | Age: 65
End: 2020-11-05

## 2020-11-10 ENCOUNTER — DOCUMENTATION (OUTPATIENT)
Dept: ENDOCRINOLOGY | Facility: HOSPITAL | Age: 65
End: 2020-11-10

## 2020-12-14 ENCOUNTER — DOCUMENTATION (OUTPATIENT)
Dept: ENDOCRINOLOGY | Facility: HOSPITAL | Age: 65
End: 2020-12-14

## 2020-12-27 LAB
ALBUMIN SERPL-MCNC: 4.3 G/DL (ref 3.8–4.8)
ALBUMIN/GLOB SERPL: 1.8 {RATIO} (ref 1.2–2.2)
ALP SERPL-CCNC: 58 IU/L (ref 39–117)
ALT SERPL-CCNC: 16 IU/L (ref 0–44)
AST SERPL-CCNC: 20 IU/L (ref 0–40)
BASOPHILS # BLD AUTO: 0 X10E3/UL (ref 0–0.2)
BASOPHILS NFR BLD AUTO: 0 %
BILIRUB SERPL-MCNC: 0.4 MG/DL (ref 0–1.2)
BUN SERPL-MCNC: 18 MG/DL (ref 8–27)
BUN/CREAT SERPL: 24 (ref 10–24)
CALCIUM SERPL-MCNC: 9.6 MG/DL (ref 8.6–10.2)
CHLORIDE SERPL-SCNC: 101 MMOL/L (ref 96–106)
CO2 SERPL-SCNC: 27 MMOL/L (ref 20–29)
CORTIS AM PEAK SERPL-MCNC: 13.2 UG/DL (ref 6.2–19.4)
CREAT SERPL-MCNC: 0.76 MG/DL (ref 0.76–1.27)
EOSINOPHIL # BLD AUTO: 0.1 X10E3/UL (ref 0–0.4)
EOSINOPHIL NFR BLD AUTO: 3 %
ERYTHROCYTE [DISTWIDTH] IN BLOOD BY AUTOMATED COUNT: 12.4 % (ref 11.6–15.4)
FSH SERPL-ACNC: 2.5 MIU/ML (ref 1.5–12.4)
GH SERPL-MCNC: 2.5 NG/ML (ref 0–10)
GLOBULIN SER-MCNC: 2.4 G/DL (ref 1.5–4.5)
GLUCOSE SERPL-MCNC: 104 MG/DL (ref 65–99)
HCT VFR BLD AUTO: 43.7 % (ref 37.5–51)
HGB BLD-MCNC: 14.2 G/DL (ref 13–17.7)
IMM GRANULOCYTES # BLD: 0 X10E3/UL (ref 0–0.1)
IMM GRANULOCYTES NFR BLD: 0 %
LH SERPL-ACNC: 1.9 MIU/ML (ref 1.7–8.6)
LYMPHOCYTES # BLD AUTO: 1.7 X10E3/UL (ref 0.7–3.1)
LYMPHOCYTES NFR BLD AUTO: 35 %
MCH RBC QN AUTO: 31.3 PG (ref 26.6–33)
MCHC RBC AUTO-ENTMCNC: 32.5 G/DL (ref 31.5–35.7)
MCV RBC AUTO: 97 FL (ref 79–97)
MONOCYTES # BLD AUTO: 0.4 X10E3/UL (ref 0.1–0.9)
MONOCYTES NFR BLD AUTO: 8 %
NEUTROPHILS # BLD AUTO: 2.6 X10E3/UL (ref 1.4–7)
NEUTROPHILS NFR BLD AUTO: 54 %
PLATELET # BLD AUTO: 305 X10E3/UL (ref 150–450)
POTASSIUM SERPL-SCNC: 4.7 MMOL/L (ref 3.5–5.2)
PROLACTIN SERPL-MCNC: 10.2 NG/ML (ref 4–15.2)
PROT SERPL-MCNC: 6.7 G/DL (ref 6–8.5)
PSA SERPL-MCNC: 4 NG/ML (ref 0–4)
RBC # BLD AUTO: 4.53 X10E6/UL (ref 4.14–5.8)
SL AMB EGFR AFRICAN AMERICAN: 110 ML/MIN/1.73
SL AMB EGFR NON AFRICAN AMERICAN: 95 ML/MIN/1.73
SODIUM SERPL-SCNC: 140 MMOL/L (ref 134–144)
T4 FREE SERPL-MCNC: 1.14 NG/DL (ref 0.82–1.77)
TESTOST FREE SERPL-MCNC: 5.4 PG/ML (ref 6.6–18.1)
TESTOST SERPL-MCNC: 242 NG/DL (ref 264–916)
TSH SERPL DL<=0.005 MIU/L-ACNC: 2.61 UIU/ML (ref 0.45–4.5)
WBC # BLD AUTO: 4.8 X10E3/UL (ref 3.4–10.8)

## 2021-01-13 ENCOUNTER — DOCUMENTATION (OUTPATIENT)
Dept: ENDOCRINOLOGY | Facility: HOSPITAL | Age: 66
End: 2021-01-13

## 2021-01-19 ENCOUNTER — TELEPHONE (OUTPATIENT)
Dept: ENDOCRINOLOGY | Facility: HOSPITAL | Age: 66
End: 2021-01-19

## 2021-01-19 NOTE — TELEPHONE ENCOUNTER
Received call from [facility] patient assistance  Sandostatin will require prior auth, call   Once determination is received fax document to [phone]

## 2021-02-10 ENCOUNTER — TELEPHONE (OUTPATIENT)
Dept: NEUROSURGERY | Facility: CLINIC | Age: 66
End: 2021-02-10

## 2021-02-10 DIAGNOSIS — D35.2 PITUITARY MACROADENOMA (HCC): Primary | ICD-10-CM

## 2021-02-10 DIAGNOSIS — E22.0 ACROMEGALY (HCC): ICD-10-CM

## 2021-02-10 DIAGNOSIS — Z01.812 ENCOUNTER FOR PREPROCEDURAL LABORATORY EXAMINATION: ICD-10-CM

## 2021-02-10 DIAGNOSIS — E23.6 PITUITARY MASS (HCC): ICD-10-CM

## 2021-02-10 NOTE — TELEPHONE ENCOUNTER
Placed orders for Imaging and pre imaging labs to be completed in preparation for upcoming fup, to be scheduled

## 2021-02-24 NOTE — TELEPHONE ENCOUNTER
2/24/21 pt called and requesting bun/creat labs been sent to [facility] in [facility] for him to get prior to Mri at [facility]  Faxed script to [phone]

## 2021-03-01 ENCOUNTER — DOCUMENTATION (OUTPATIENT)
Dept: ENDOCRINOLOGY | Facility: HOSPITAL | Age: 66
End: 2021-03-01

## 2021-03-01 VITALS — BODY MASS INDEX: 23.48 KG/M2 | HEIGHT: 70 IN | WEIGHT: 164 LBS

## 2021-03-01 DIAGNOSIS — Z12.11 SCREENING FOR COLON CANCER: Primary | ICD-10-CM

## 2021-03-01 NOTE — TELEPHONE ENCOUNTER
Spoke with pt  Could not go over instructions at the moment  He will call back later this afternoon   ts

## 2021-03-01 NOTE — TELEPHONE ENCOUNTER
Why does your doctor want you to have this procedure? Screening  Do you have kidney disease?  no  If yes, are you on dialysis :     Have you had diverticulitis within the past 2 months? no    Are you diabetic?  no  If yes, insulin dependent: If yes, provide diabetic instructions sheet     Do take iron supplements?  no  If yes, instruct patient to hold iron supplement for 7 days prior    Are you on a blood thinner? no   Was the blood thinner sheet complete and faxed to cardiologist no  Plavix (clopidogrel), Coumadin (warfarin), Lovenox (enoxaparin), Xarelto (rivaroxaban), Pradaxa(dabigatran), Eliquis(apixaban) Savaysa/Lixiana (edoxapan)    Do you have an automatic implantable cardiac defibrillator (AICD)/pacemaker ([facility])? no  Was AICD/pacemaker sheet completed and faxed to cardiologist? no    Are you on home oxygen? no  If yes, continuous or nocturnal:     Have you been treated for MRSA, VRE or any communicable diseases? no    Heart attack, stroke, or stent within 3 months? no  Schedule at Hospital if within 3-6 months   Use nitroglycerin for chest pain in the last 6 months? no    History of organ  transplant?  no   If yes, notify Endo      History of neck/throat/tongue surgery or cancer? no  IF yes, notify Endo      Any problems with anesthesia in the past? no     Was stool C diff ordered?  no Stool specimen needs to be completed prior to procedure    Do have any facial or body piercings?no     Do you have a latex allergy? no     Do have an allergy to metals? (Bravo study only) no     If pediatric patient, was consent faxed to pediatrician no     Patient rights reviewed no     Rx sent for Suprep to provider for signature  Instructions emailed to patient

## 2021-03-06 LAB
BUN SERPL-MCNC: 18 MG/DL (ref 8–27)
BUN/CREAT SERPL: 24 (ref 10–24)
CREAT SERPL-MCNC: 0.76 MG/DL (ref 0.76–1.27)
SL AMB EGFR AFRICAN AMERICAN: 110 ML/MIN/1.73
SL AMB EGFR NON AFRICAN AMERICAN: 95 ML/MIN/1.73

## 2021-03-08 ENCOUNTER — HOSPITAL ENCOUNTER (OUTPATIENT)
Dept: GASTROENTEROLOGY | Facility: AMBULATORY SURGERY CENTER | Age: 66
Discharge: HOME | End: 2021-03-08
Payer: COMMERCIAL

## 2021-03-08 ENCOUNTER — ANESTHESIA (OUTPATIENT)
Dept: GASTROENTEROLOGY | Facility: AMBULATORY SURGERY CENTER | Age: 66
End: 2021-03-08

## 2021-03-08 ENCOUNTER — ANESTHESIA EVENT (OUTPATIENT)
Dept: GASTROENTEROLOGY | Facility: AMBULATORY SURGERY CENTER | Age: 66
End: 2021-03-08

## 2021-03-08 VITALS
OXYGEN SATURATION: 97 % | SYSTOLIC BLOOD PRESSURE: 102 MMHG | BODY MASS INDEX: 23.87 KG/M2 | HEART RATE: 59 BPM | TEMPERATURE: 97.1 F | HEIGHT: 70 IN | DIASTOLIC BLOOD PRESSURE: 71 MMHG | RESPIRATION RATE: 18 BRPM

## 2021-03-08 DIAGNOSIS — Z86.010 HISTORY OF COLON POLYPS: ICD-10-CM

## 2021-03-08 PROCEDURE — 45380 COLONOSCOPY AND BIOPSY: CPT | Performed by: INTERNAL MEDICINE

## 2021-03-08 PROCEDURE — 88305 TISSUE EXAM BY PATHOLOGIST: CPT | Performed by: PATHOLOGY

## 2021-03-08 RX ORDER — SODIUM CHLORIDE 9 MG/ML
50 INJECTION, SOLUTION INTRAVENOUS CONTINUOUS
Status: DISCONTINUED | OUTPATIENT
Start: 2021-03-08 | End: 2021-03-12 | Stop reason: HOSPADM

## 2021-03-08 RX ORDER — PROPOFOL 10 MG/ML
INJECTION, EMULSION INTRAVENOUS AS NEEDED
Status: DISCONTINUED | OUTPATIENT
Start: 2021-03-08 | End: 2021-03-08

## 2021-03-08 RX ADMIN — PROPOFOL 20 MG: 10 INJECTION, EMULSION INTRAVENOUS at 09:38

## 2021-03-08 RX ADMIN — PROPOFOL 30 MG: 10 INJECTION, EMULSION INTRAVENOUS at 09:29

## 2021-03-08 RX ADMIN — PROPOFOL 30 MG: 10 INJECTION, EMULSION INTRAVENOUS at 09:30

## 2021-03-08 RX ADMIN — PROPOFOL 30 MG: 10 INJECTION, EMULSION INTRAVENOUS at 09:28

## 2021-03-08 RX ADMIN — PROPOFOL 20 MG: 10 INJECTION, EMULSION INTRAVENOUS at 09:32

## 2021-03-08 RX ADMIN — PROPOFOL 50 MG: 10 INJECTION, EMULSION INTRAVENOUS at 09:27

## 2021-03-08 RX ADMIN — PROPOFOL 20 MG: 10 INJECTION, EMULSION INTRAVENOUS at 09:40

## 2021-03-08 RX ADMIN — SODIUM CHLORIDE 50 ML/HR: 9 INJECTION, SOLUTION INTRAVENOUS at 09:14

## 2021-03-08 RX ADMIN — PROPOFOL 20 MG: 10 INJECTION, EMULSION INTRAVENOUS at 09:34

## 2021-03-08 RX ADMIN — PROPOFOL 20 MG: 10 INJECTION, EMULSION INTRAVENOUS at 09:36

## 2021-03-08 NOTE — ANESTHESIA PREPROCEDURE EVALUATION
Procedure:  COLONOSCOPY    Relevant Problems   ANESTHESIA (within normal limits)      Other   (+) Acromegaly (HCC)   (+) Elevated insulin-like growth factor 1 (IGF-1) level   (+) Glaucoma   (+) Hyperprolactinemia (HCC)   (+) Hypogonadism male   (+) Hypogonadotropic hypogonadism (HCC)   (+) Pituitary macroadenoma (HCC)        Physical Exam    Airway    Mallampati score: II  TM Distance: >3 FB  Neck ROM: full     Dental   No notable dental hx     Cardiovascular  Rhythm: regular, Rate: normal, Cardiovascular exam normal    Pulmonary  Pulmonary exam normal Breath sounds clear to auscultation,     Other Findings        Anesthesia Plan  ASA Score- 1     Anesthesia Type- IV sedation with anesthesia with ASA Monitors  Additional Monitors:   Airway Plan:           Plan Factors-Exercise tolerance (METS): >4 METS  Chart reviewed  Patient summary reviewed  Patient is not a current smoker  Induction- intravenous  Postoperative Plan-     Informed Consent- Anesthetic plan and risks discussed with patient

## 2021-03-08 NOTE — H&P
History and Physical - [facility]  Harry Smith 76 y o  male MRN: [id number]    HPI: Harry Smith is a 76y o  year old male who presents for colon cancer screening    REVIEW OF SYSTEMS: Per the HPI, and otherwise unremarkable      Historical Information   Past Medical History:   Diagnosis Date    Decreased stamina     Feeling tired     Glaucoma     right eye    Lipoma     Lipoma of left shoulder     Muscle weakness     Open-angle glaucoma     left eye severe    Pituitary mass (HCC)      Past Surgical History:   Procedure Laterality Date    ANKLE SURGERY      lipoma    FINGER TENDON REPAIR      HAND SURGERY      HERNIA REPAIR Left     inguinal    KNEE ARTHROSCOPY      [place] NEUROENDOSCOP,EXC,PIT MAKI,TRANSNAS/SPHEN N/A 2018    Procedure: Image guided endoscopic transsphenoidal approach for debulking of pituitary macroadenoma, abdominal fat graft harvesting;  Surgeon: Silvia Gonzalez MD;  [facility];  Service: Neurosurgery    SHOULDER SURGERY      lipoma removal     Social History   Social History     Substance and Sexual Activity   Alcohol Use Yes    Frequency: 2-3 times a week    Comment: occasional     Social History     Substance and Sexual Activity   Drug Use No     Social History     Tobacco Use   Smoking Status Former Smoker    Types: Cigarettes    Quit date: 0    Years since quittin 1   Smokeless Tobacco Never Used   Tobacco Comment    quit 15 years ago     Family History   Problem Relation Age of Onset    No Known Problems Mother     Cancer Father        Meds/Allergies       Current Outpatient Medications:     COMBIGAN 0 2-0 5 %    Cyanocobalamin (VITAMIN B 12 PO)    latanoprost (XALATAN) 0 005 % ophthalmic solution    Multiple Vitamins-Minerals (CENTRUM ADULTS PO)    Na Sulfate-K Sulfate-Mg Sulf 17 5-3 13-1 6 GM/177ML SOLN    octreotide (SandoSTATIN LAR Depot) 20 mg kit    Current Facility-Administered Medications:     sodium chloride 0 9 % infusion, 50 mL/hr, Intravenous, Continuous, 50 mL/hr at 03/08/21 4488    No Known Allergies    Objective     /88   Pulse 58   Temp (!) 97 1 °F (36 2 °C) (Temporal)   Resp 14   Ht 5' 9 5" (1 765 m)   SpO2 99%   BMI 23 87 kg/m²     PHYSICAL EXAM    Gen: NAD AAOx3  CV: S1S2 RRR no m/r/g  CHEST: Clear b/l no c/r/w  ABD: soft, +BS NT/ND  EXT: no edema    ASSESSMENT/PLAN:  This is a 76y o  year old male here for colonoscopy, and he is stable and optimized for his procedure

## 2021-03-08 NOTE — DISCHARGE INSTRUCTIONS
Hemorrhoids   WHAT YOU NEED TO KNOW:   What are hemorrhoids? Hemorrhoids are swollen blood vessels inside your rectum (internal hemorrhoids) or on your anus (external hemorrhoids)  Sometimes a hemorrhoid may prolapse  This means it extends out of your anus  What increases my risk for hemorrhoids? · Pregnancy or obesity    · Straining or sitting for a long time during bowel movements    · Liver disease    · Weak muscles around the anus caused by older age, rectal surgery, or anal intercourse    · A lack of physical activity    · Chronic diarrhea or constipation    · A low-fiber diet    What are the signs and symptoms of hemorrhoids? · Pain or itching around your anus or inside your rectum    · Swelling or bumps around your anus    · Bright red blood in your bowel movement, on the toilet paper, or in the toilet bowl    · Tissue bulging out of your anus (prolapsed hemorrhoids)    · Incontinence (poor control over urine or bowel movements)    How are hemorrhoids diagnosed? Your healthcare provider will ask about your symptoms, the foods you eat, and your bowel movements  He or she will examine your anus for external hemorrhoids  You may need the following:  · A digital rectal exam  is a test to check for hemorrhoids  Your healthcare provider will put a gloved finger inside your anus to feel for the hemorrhoids  · An anoscopy  is a test that uses a scope (small tube with a light and camera on the end) to look at your hemorrhoids  How are hemorrhoids treated? Treatment will depend on your symptoms  You may need any of the following:  · Medicines  can help decrease pain and swelling, and soften your bowel movement  The medicine may be a pill, pad, cream, or ointment  · Procedures  may be used to shrink or remove your hemorrhoid  Examples include rubber-band ligation, sclerotherapy, and photocoagulation  These procedures may be done in your healthcare provider's office   Ask your healthcare provider for more information about these procedures  · Surgery  may be needed to shrink or remove your hemorrhoids  How can I manage my symptoms? · Apply ice on your anus for 15 to 20 minutes every hour or as directed  Use an ice pack, or put crushed ice in a plastic bag  Cover it with a towel before you apply it to your anus  Ice helps prevent tissue damage and decreases swelling and pain  · Take a sitz bath  Fill a bathtub with 4 to 6 inches of warm water  You may also use a sitz bath pan that fits inside a toilet bowl  Sit in the sitz bath for 15 minutes  Do this 3 times a day, and after each bowel movement  The warm water can help decrease pain and swelling  · Keep your anal area clean  Gently wash the area with warm water daily  Soap may irritate the area  After a bowel movement, wipe with moist towelettes or wet toilet paper  Dry toilet paper can irritate the area  How can I help prevent hemorrhoids? · Do not strain to have a bowel movement  Do not sit on the toilet too long  These actions can increase pressure on the tissues in your rectum and anus  · Drink plenty of liquids  Liquids can help prevent constipation  Ask how much liquid to drink each day and which liquids are best for you  · Eat a variety of high-fiber foods  Examples include fruits, vegetables, and whole grains  Ask your healthcare provider how much fiber you need each day  You may need to take a fiber supplement  · Exercise as directed  Exercise, such as walking, may make it easier to have a bowel movement  Ask your healthcare provider to help you create an exercise plan  · Do not have anal sex  Anal sex can weaken the skin around your rectum and anus  · Avoid heavy lifting  This can cause straining and increase your risk for another hemorrhoid  When should I seek immediate care? · You have severe pain in your rectum or around your anus      · You have severe pain in your abdomen and you are vomiting  · You have bleeding from your anus that soaks through your underwear  When should I contact my healthcare provider? · You have frequent and painful bowel movements  · Your hemorrhoid looks or feels more swollen than usual      · You do not have a bowel movement for 2 days or more  · You see or feel tissue coming through your anus  · You have questions or concerns about your condition or care  CARE AGREEMENT:   You have the right to help plan your care  Learn about your health condition and how it may be treated  Discuss treatment options with your healthcare providers to decide what care you want to receive  You always have the right to refuse treatment  The above information is an  only  It is not intended as medical advice for individual conditions or treatments  Talk to your doctor, nurse or pharmacist before following any medical regimen to see if it is safe and effective for you  © Copyright [address] Information is for End User's use only and may not be sold, redistributed or otherwise used for commercial purposes  All illustrations and images included in CareNotes® are the copyrighted property of [employer]  or [address] [place] [address] [place]   Diverticulosis   WHAT YOU NEED TO KNOW:   What is diverticulosis? Diverticulosis is a condition that causes small pockets called diverticula to form in your intestine  These pockets make it difficult for bowel movements to pass through your digestive system  What causes diverticulosis? Diverticula form when muscles have to work hard to move bowel movements through the intestine  The force causes bulges to form at weak areas in the intestine  This may happen if you eat foods that are low in fiber  Fiber helps give your bowel movements more bulk so they are larger and easier to move through your colon   The following may increase your risk of diverticulosis:  · A history of constipation    · Age 36 or older    · Obesity    · Lack of exercise    What are the signs and symptoms of diverticulosis? Diverticulosis usually does not cause any signs or symptoms  It may cause any of the following in some people:  · Pain or discomfort in your lower abdomen    · Abdominal bloating    · Constipation or diarrhea    How is diverticulosis diagnosed? Your healthcare provider will examine you and ask about your bowel movements, diet, and symptoms  He or she will also ask about any medical conditions you have or medicines you take  You may need any of the following:  · Blood tests  may be done to check for signs of inflammation  · A barium enema  is an x-ray of your colon that may show diverticula  A tube is put into your anus, and a liquid called barium is put through the tube  Barium is used so that healthcare providers can see your colon more clearly  · Flexible sigmoidoscopy  is a test to look for any changes in your lower intestines and rectum  It may also show the cause of any bleeding or pain  A soft, bendable tube with a light on the end will be put into your anus  It will then be moved forward into your intestine  · A colonoscopy  is used to look at your whole colon  A scope (long bendable tube with a light on the end) is used to take pictures  This test may show diverticula  · A CT scan , or CAT scan, may show diverticula  You may be given contrast liquid before the scan  Tell the healthcare provider if you have ever had an allergic reaction to contrast liquid  How is diverticulosis managed? The goal of treatment is to manage any symptoms you have and prevent other problems such as diverticulitis  Diverticulitis is swelling or infection of the diverticula  Your healthcare provider may recommend any of the following:  · Eat a variety of high-fiber foods  High-fiber foods help you have regular bowel movements  High-fiber foods include cooked beans, fruits, vegetables, and some cereals   Most adults need 25 to 35 grams of fiber each day  Your healthcare provider may recommend that you have more  Ask your healthcare provider how much fiber you need  Increase fiber slowly  You may have abdominal discomfort, bloating, and gas if you add fiber to your diet too quickly  You may need to take a fiber supplement if you are not getting enough fiber from food  · Medicines  to soften your bowel movements may be given  You may also need medicines to treat symptoms such as bloating and pain  · Drink liquids as directed  You may need to drink 2 to 3 liters (8 to 12 cups) of liquids every day  Ask your healthcare provider how much liquid to drink each day and which liquids are best for you  · Apply heat  on your abdomen for 20 to 30 minutes every 2 hours for as many days as directed  Heat helps decrease pain and muscle spasms  How can I help prevent diverticulitis or other symptoms? The following may help decrease your risk for diverticulitis or symptoms, such as bleeding  Talk to your provider about these or other things you can do to prevent problems that may occur with diverticulosis  · Exercise regularly  Ask your healthcare provider about the best exercise plan for you  Exercise can help you have regular bowel movements  Get 30 minutes of exercise on most days of the week  · Maintain a healthy weight  Ask your healthcare provider how much you should weigh  Ask him or her to help you create a weight loss plan if you are overweight  · Do not smoke  Nicotine and other chemicals in cigarettes increase your risk for diverticulitis  Ask your healthcare provider for information if you currently smoke and need help to quit  E-cigarettes or smokeless tobacco still contain nicotine  Talk to your healthcare provider before you use these products  · Ask your healthcare provider if it is safe to take NSAIDs  NSAIDs may increase your risk of diverticulitis  When should I seek immediate care? · You have severe pain on the left side of your lower abdomen  · Your bowel movements are bright or dark red  When should I contact my healthcare provider? · You have a fever and chills  · You feel dizzy or lightheaded  · You have nausea, or you are vomiting  · You have a change in your bowel movements  · You have questions or concerns about your condition or care  CARE AGREEMENT:   You have the right to help plan your care  Learn about your health condition and how it may be treated  Discuss treatment options with your healthcare providers to decide what care you want to receive  You always have the right to refuse treatment  The above information is an  only  It is not intended as medical advice for individual conditions or treatments  Talk to your doctor, nurse or pharmacist before following any medical regimen to see if it is safe and effective for you  © Copyright [address] Information is for End User's use only and may not be sold, redistributed or otherwise used for commercial purposes  All illustrations and images included in CareNotes® are the copyrighted property of [employer] M , [employer]  or [address] [place] [address]  Colorectal Polyps   WHAT YOU NEED TO KNOW:   What are colorectal polyps? Colorectal polyps are small growths of tissue in the lining of the colon and rectum  Most polyps are hyperplastic polyps and are usually benign (noncancerous)  Certain types of polyps, called adenomatous polyps, may turn into cancer  What increases my risk of colorectal polyps? The exact cause of colorectal polyps is unknown   The following may increase your risk:  · Older age    · A diet of foods high in fat and low in fiber     · Family history of polyps    · Intestinal diseases, such as Crohn's disease or ulcerative colitis    · An unhealthy lifestyle, such as physical inactivity, smoking, or drinking alcohol    · Obesity    What are the signs and symptoms of colorectal polyps? · Blood in your bowel movement or bleeding from the rectum    · Change in bowel movement habits, such as diarrhea and constipation    · Abdominal pain    How are colorectal polyps diagnosed? You should have fecal blood screening once a year for colorectal disease if you are over 48years old  You should be screened earlier if you have an intestinal disease or a family history of polyps or colorectal cancer  During this screening, a sample of your bowel movement is checked for blood, which may be an early sign of colorectal polyps or cancer  You may also need any of the following tests:  · Digital rectal exam:  Your healthcare provider will examine your anus and use a finger to check your rectum for polyps  · Barium enema: A barium enema is an x-ray of the colon  A tube is put into your anus, and a liquid called barium is put through the tube  Barium is used so that healthcare providers can see your colon better on the x-ray film  · Virtual colonoscopy: This is a CT scan that takes pictures of the inside of your colon and rectum  A small, flexible tube is put into your rectum and air or carbon dioxide (gas) is used to expand your colon  This lets healthcare providers clearly see your colon and any polyps on a monitor  · Colonoscopy or sigmoidoscopy: These procedures help your healthcare provider see the inside of your colon using a flexible tube with a small light and camera on the end  During a sigmoidoscopy, your healthcare provider will only look at rectum and lower colon  During a colonoscopy, healthcare providers will look at the full length of your colon  Healthcare providers may remove a small amount of tissue from the colon for a biopsy  How are colorectal polyps treated? A polypectomy is a minimally invasive procedure to remove your polyps  They may be removed during a colonoscopy or sigmoidoscopy  Your healthcare provider may need to remove the polyps with a laparoscope  Laparoscopy is done by inserting a small, flexible scope into incisions made on your abdomen  What are the risks of colorectal polyps? You may bleed during a colonoscopy procedure  Your bowel may be perforated (torn) when polyps are removed  This may lead to an open abdominal surgery  During surgery, you may bleed too much or get an infection  Adenomatous polyps that are not removed may turn into cancer and become more difficult to treat  Where can I find support and more information? · [facility] ([facility]) [address] [place], [place] [address]  Phone: [phone]  Web Address: Benito Newsome  [facility] nih gov    When should I contact my healthcare provider? · You have a fever  · You have chills, a cough, or feel weak and achy  · You have abdominal pain that does not go away or gets worse after you take medicine  · Your abdomen is swollen  · You are losing weight without trying  · You have questions or concerns about your condition or care  When should I seek immediate care or call 911? · You have sudden shortness of breath  · You have a fast heart rate, fast breathing, or are too dizzy to stand up  · You have severe abdominal pain  · You see blood in your bowel movement  CARE AGREEMENT:   You have the right to help plan your care  Learn about your health condition and how it may be treated  Discuss treatment options with your healthcare providers to decide what care you want to receive  You always have the right to refuse treatment  The above information is an  only  It is not intended as medical advice for individual conditions or treatments  Talk to your doctor, nurse or pharmacist before following any medical regimen to see if it is safe and effective for you    © Copyright [address] Information is for End User's use only and may not be sold, redistributed or otherwise used for commercial purposes   All illustrations and images included in CareNotes® are the copyrighted property of A D A M , Inc  or Marcia Ferris

## 2021-03-25 ENCOUNTER — DOCUMENTATION (OUTPATIENT)
Dept: ENDOCRINOLOGY | Facility: HOSPITAL | Age: 66
End: 2021-03-25

## 2021-04-01 ENCOUNTER — TELEPHONE (OUTPATIENT)
Dept: ENDOCRINOLOGY | Facility: HOSPITAL | Age: 66
End: 2021-04-01

## 2021-04-01 DIAGNOSIS — E22.0 ACROMEGALY (HCC): Primary | ICD-10-CM

## 2021-04-01 DIAGNOSIS — D35.2 PITUITARY MACROADENOMA (HCC): ICD-10-CM

## 2021-04-01 NOTE — TELEPHONE ENCOUNTER
Patient needs follow-up appointment arranged next available  No rush  Will order labs for before that appointment

## 2021-04-12 ENCOUNTER — DOCUMENTATION (OUTPATIENT)
Dept: ENDOCRINOLOGY | Facility: HOSPITAL | Age: 66
End: 2021-04-12

## 2021-04-30 ENCOUNTER — DOCUMENTATION (OUTPATIENT)
Dept: ENDOCRINOLOGY | Facility: HOSPITAL | Age: 66
End: 2021-04-30

## 2021-05-07 LAB
ALBUMIN SERPL-MCNC: 4.2 G/DL (ref 3.8–4.8)
ALBUMIN/GLOB SERPL: 2 {RATIO} (ref 1.2–2.2)
ALP SERPL-CCNC: 61 IU/L (ref 39–117)
ALT SERPL-CCNC: 26 IU/L (ref 0–44)
AST SERPL-CCNC: 22 IU/L (ref 0–40)
BASOPHILS # BLD AUTO: 0 X10E3/UL (ref 0–0.2)
BASOPHILS NFR BLD AUTO: 1 %
BILIRUB SERPL-MCNC: 0.5 MG/DL (ref 0–1.2)
BUN SERPL-MCNC: 21 MG/DL (ref 8–27)
BUN/CREAT SERPL: 27 (ref 10–24)
CALCIUM SERPL-MCNC: 9.3 MG/DL (ref 8.6–10.2)
CHLORIDE SERPL-SCNC: 103 MMOL/L (ref 96–106)
CO2 SERPL-SCNC: 23 MMOL/L (ref 20–29)
CORTIS AM PEAK SERPL-MCNC: 12.6 UG/DL (ref 6.2–19.4)
CREAT SERPL-MCNC: 0.79 MG/DL (ref 0.76–1.27)
EOSINOPHIL # BLD AUTO: 0.2 X10E3/UL (ref 0–0.4)
EOSINOPHIL NFR BLD AUTO: 4 %
ERYTHROCYTE [DISTWIDTH] IN BLOOD BY AUTOMATED COUNT: 12.9 % (ref 11.6–15.4)
GLOBULIN SER-MCNC: 2.1 G/DL (ref 1.5–4.5)
GLUCOSE SERPL-MCNC: 107 MG/DL (ref 65–99)
HCT VFR BLD AUTO: 42.6 % (ref 37.5–51)
HGB BLD-MCNC: 14.2 G/DL (ref 13–17.7)
IGF-I SERPL-MCNC: 238 NG/ML (ref 59–230)
IMM GRANULOCYTES # BLD: 0 X10E3/UL (ref 0–0.1)
IMM GRANULOCYTES NFR BLD: 0 %
LYMPHOCYTES # BLD AUTO: 1.4 X10E3/UL (ref 0.7–3.1)
LYMPHOCYTES NFR BLD AUTO: 28 %
MCH RBC QN AUTO: 31.6 PG (ref 26.6–33)
MCHC RBC AUTO-ENTMCNC: 33.3 G/DL (ref 31.5–35.7)
MCV RBC AUTO: 95 FL (ref 79–97)
MONOCYTES # BLD AUTO: 0.5 X10E3/UL (ref 0.1–0.9)
MONOCYTES NFR BLD AUTO: 9 %
NEUTROPHILS # BLD AUTO: 2.8 X10E3/UL (ref 1.4–7)
NEUTROPHILS NFR BLD AUTO: 58 %
PLATELET # BLD AUTO: 273 X10E3/UL (ref 150–450)
POTASSIUM SERPL-SCNC: 4.3 MMOL/L (ref 3.5–5.2)
PROLACTIN SERPL-MCNC: 10.6 NG/ML (ref 4–15.2)
PROT SERPL-MCNC: 6.3 G/DL (ref 6–8.5)
RBC # BLD AUTO: 4.49 X10E6/UL (ref 4.14–5.8)
SL AMB EGFR AFRICAN AMERICAN: 108 ML/MIN/1.73
SL AMB EGFR NON AFRICAN AMERICAN: 94 ML/MIN/1.73
SODIUM SERPL-SCNC: 139 MMOL/L (ref 134–144)
T4 FREE SERPL-MCNC: 1.29 NG/DL (ref 0.82–1.77)
TESTOST FREE SERPL-MCNC: 4.6 PG/ML (ref 6.6–18.1)
TESTOST SERPL-MCNC: 281 NG/DL (ref 264–916)
TSH SERPL DL<=0.005 MIU/L-ACNC: 2.6 UIU/ML (ref 0.45–4.5)
WBC # BLD AUTO: 4.9 X10E3/UL (ref 3.4–10.8)

## 2021-05-11 ENCOUNTER — DOCUMENTATION (OUTPATIENT)
Dept: ENDOCRINOLOGY | Facility: HOSPITAL | Age: 66
End: 2021-05-11

## 2021-05-28 ENCOUNTER — OFFICE VISIT (OUTPATIENT)
Dept: ENDOCRINOLOGY | Facility: HOSPITAL | Age: 66
End: 2021-05-28
Payer: COMMERCIAL

## 2021-05-28 VITALS
WEIGHT: 165.4 LBS | HEART RATE: 54 BPM | BODY MASS INDEX: 23.68 KG/M2 | DIASTOLIC BLOOD PRESSURE: 90 MMHG | HEIGHT: 70 IN | SYSTOLIC BLOOD PRESSURE: 130 MMHG

## 2021-05-28 DIAGNOSIS — E22.0 ACROMEGALY (HCC): Primary | ICD-10-CM

## 2021-05-28 PROCEDURE — 99214 OFFICE O/P EST MOD 30 MIN: CPT | Performed by: INTERNAL MEDICINE

## 2021-05-28 NOTE — PROGRESS NOTES
5/28/2021    Assessment/Plan      Diagnoses and all orders for this visit:    Acromegaly [facility])  -     Comprehensive metabolic panel Lab Collect; Future  -     Insulin-like growth factor 1 (IGF-1) - Lab Collect; Future  -     Growth hormone; Future  -     CBC and Platelet- Lab Collect; Future  -     Testosterone, free, total Lab Collect; Future        Assessment/Plan:    Acromegaly:  He does state due to a delay with the pharmaceutical company, he missed about 6-7 weeks of medication in this may explain his slight elevation in IGF-1  Prior to that he is biochemically controlled  For now we will continue current regimen repeat labs as ordered above in about 3 months and call with the results  I asked him to make sure he follows up with Neurosurgery to review MRI but for now from my perspective we will continue to monitor on medical management  CC: Follow-up    History of Present Illness     HPI: Estefany Merritt is a 76y o  year old male with history of  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  He has had residual disease both structurally on MRI as well as biochemically  He was started on lanreotide 90 mg every 4 weeks  He did receive 3 injections but suffer loose stools and abdominal cramping in as results and inguinal hernia and therefore discontinue this medication  We did try cabergoline  But this did not provide adequate biochemical response  We then transition to him to octreotide LAR 20 mg every 28 days  Since his last appointment he had a colonoscopy in March of 2021  Recent pituitary MRI in March of 2021 showed stable residual disease  He presents today overall feeling well  He is tolerating his injections fine  No symptoms of concern today  Review of Systems   Constitutional: Negative for fatigue  HENT: Negative for trouble swallowing and voice change      Eyes: Negative for visual disturbance  Respiratory: Negative for shortness of breath  Cardiovascular: Negative for palpitations and leg swelling  Gastrointestinal: Negative for abdominal pain, nausea and vomiting  Endocrine: Negative for polydipsia and polyuria  Musculoskeletal: Negative for arthralgias and myalgias  Skin: Negative for rash  Neurological: Negative for dizziness, tremors and weakness  Hematological: Negative for adenopathy  Psychiatric/Behavioral: Negative for agitation and confusion         Historical Information   Past Medical History:   Diagnosis Date    Decreased stamina     Feeling tired     Glaucoma     right eye    Lipoma     Lipoma of left shoulder     Muscle weakness     Open-angle glaucoma     left eye severe    Pituitary mass (HCC)      Past Surgical History:   Procedure Laterality Date    ANKLE SURGERY      lipoma    FINGER TENDON REPAIR      HAND SURGERY      HERNIA REPAIR Left     inguinal    KNEE ARTHROSCOPY      MA NEUROENDOSCOP,EXC,PIT MAKI,TRANSNAS/SPHEN N/A 2018    Procedure: Image guided endoscopic transsphenoidal approach for debulking of pituitary macroadenoma, abdominal fat graft harvesting;  Surgeon: Kari Mcallister MD;  Location: [facility];  Service: Neurosurgery    SHOULDER SURGERY      lipoma removal     Social History   Social History     Substance and Sexual Activity   Alcohol Use Yes    Frequency: 2-3 times a week    Comment: occasional     Social History     Substance and Sexual Activity   Drug Use No     Social History     Tobacco Use   Smoking Status Former Smoker    Types: Cigarettes    Quit date: 0    Years since quittin 4   Smokeless Tobacco Never Used   Tobacco Comment    quit 15 years ago     Family History:   Family History   Problem Relation Age of Onset    No Known Problems Mother     Cancer Father        Meds/Allergies   Current Outpatient Medications   Medication Sig Dispense Refill    COMBIGAN 0 2-0 5 % INSTILL 1 DROP INTO LEFT EYE TWICE DAILY  5    Cyanocobalamin (VITAMIN B 12 PO) Take 1 tablet by mouth daily      latanoprost (XALATAN) 0 005 % ophthalmic solution 1 drop daily at bedtime      Multiple Vitamins-Minerals (CENTRUM ADULTS PO) Take 1 tablet by mouth daily      octreotide (SandoSTATIN LAR [place]) 20 mg kit Inject 20 mg into a muscle every 28 days 3 kit 3     No current facility-administered medications for this visit  No Known Allergies    Objective   Vitals: Blood pressure 130/90, pulse (!) 54, height 5' 9 5" (1 765 m), weight 75 kg (165 lb 6 4 oz)  Invasive Devices     None                 Physical Exam  Vitals signs reviewed  Constitutional:       General: He is not in acute distress  Appearance: He is well-developed  He is not diaphoretic  HENT:      Head: Normocephalic and atraumatic  Eyes:      Conjunctiva/sclera: Conjunctivae normal       Pupils: Pupils are equal, round, and reactive to light  Neck:      Musculoskeletal: Normal range of motion and neck supple  Thyroid: No thyromegaly  Cardiovascular:      Rate and Rhythm: Normal rate and regular rhythm  Pulmonary:      Effort: Pulmonary effort is normal  No respiratory distress  Breath sounds: Normal breath sounds  Abdominal:      General: Bowel sounds are normal       Palpations: Abdomen is soft  Musculoskeletal: Normal range of motion  Skin:     General: Skin is warm and dry  Findings: No rash  Neurological:      Mental Status: He is alert and oriented to person, place, and time  Motor: No abnormal muscle tone  Psychiatric:         Behavior: Behavior normal          The history was obtained from the review of the chart and from the patient      Lab Results:      Component      Latest Ref Rng & Units 5/5/2021   White Blood Cell Count      3 4 - 10 8 x10E3/uL 4 9   Red Blood Cell Count      4 14 - 5 80 x10E6/uL 4 49   Hemoglobin      13 0 - 17 7 g/dL 14 2   HCT      37 5 - 51 0 % 42 6   MCV      79 - 97 fL 95   MCH 26 6 - 33 0 pg 31 6   MCHC  31 5 - 35 7 g/dL 33 3   RDW      11 6 - 15 4 % 12 9   Platelet Count      814 - 450 x10E3/uL 273   Neutrophils      Not Estab  % 58   Lymphocytes      Not Estab  % 28   Monocytes      Not Estab  % 9   Eosinophils      Not Estab  % 4   Basophils PCT      Not Estab  % 1   Neutrophils (Absolute)      1 4 - 7 0 x10E3/uL 2 8   Lymphocytes (Absolute)      0 7 - 3 1 x10E3/uL 1 4   Monocytes (Absolute)      0 1 - 0 9 x10E3/uL 0 5   Eosinophils (Absolute)      0 0 - 0 4 x10E3/uL 0 2   Basophils ABS      0 0 - 0 2 x10E3/uL 0 0   Immature Granulocytes      Not Estab  % 0   Immature Granulocytes (Absolute)      0 0 - 0 1 x10E3/uL 0 0   Glucose, Random      65 - 99 mg/dL 107 (H)   BUN      8 - 27 mg/dL 21   Creatinine      0 76 - 1 27 mg/dL 0 79   eGFR Non       >59 mL/min/1 73 94   eGFR       >59 mL/min/1 73 108   SL AMB BUN/CREATININE RATIO      10 - 24 27 (H)   Sodium      134 - 144 mmol/L 139   Potassium      3 5 - 5 2 mmol/L 4 3   Chloride      96 - 106 mmol/L 103   CO2      20 - 29 mmol/L 23   CALCIUM      8 6 - 10 2 mg/dL 9 3   Total Protein      6 0 - 8 5 g/dL 6 3   Albumin      3 8 - 4 8 g/dL 4 2   Globulin, Total      1 5 - 4 5 g/dL 2 1   Albumin/Globulin Ratio      1 2 - 2 2 2 0   TOTAL BILIRUBIN      0 0 - 1 2 mg/dL 0 5   ALKALINE PHOSPHATASE ISOENZYMES      39 - 117 IU/L 61   AST      0 - 40 IU/L 22   ALT      0 - 44 IU/L 26   Testosterone, Total, LC/MS      264 - 916 ng/dL 281   TESTOSTERONE FREE      6 6 - 18 1 pg/mL 4 6 (L)   Free T4      0 82 - 1 77 ng/dL 1 29   TSH, POC      0 450 - 4 500 uIU/mL 2 600   PROLACTIN      4 0 - 15 2 ng/mL 10 6   INSULIN-LIKE GROWTH FACTOR-1      59 - 230 ng/mL 238 (H)   Cortisol AM      6 2 - 19 4 ug/dL 12 6       No future appointments  Portions of the record may have been created with voice recognition software   Occasional wrong word or "sound a like" substitutions may have occurred due to the inherent limitations of voice recognition software  Read the chart carefully and recognize, using context, where substitutions have occurred

## 2021-06-03 ENCOUNTER — TELEPHONE (OUTPATIENT)
Dept: NEUROSURGERY | Facility: CLINIC | Age: 66
End: 2021-06-03

## 2021-06-03 NOTE — TELEPHONE ENCOUNTER
Contacted Keerthi Holder to schedule his fup visit with Dr Alicja Moss  Was able to arrange his visit for 7/15 230pm      MRI uploaded to PACs

## 2021-06-03 NOTE — TELEPHONE ENCOUNTER
----- Message from Enma Macedo MD sent at 6/2/2021  8:23 AM EDT -----  Regarding: RE: pituitary macroadenoma  Please arrange for a routine f/u  Can be done virtual  ----- Message -----  From: Emmanuel Zapata RN  Sent: 5/28/2021  11:00 AM EDT  To: Enma Macedo MD  Subject: pituitary Alphonse Enriquez,    I received this message from Dr Clementina Carter about this mutual patient  He appears to have had MRI completed in March at outside facility  He was last seen by you 12/3/2019 and was supposed to follow-up in 6 months  He has not yet returned  When you have a moment can you take a look at his MRI (can be found in PACs) and advise on the next steps for his follow-up? Thanks for your time! Debbie  ----- Message -----  From: Roger Grant DO  Sent: 5/28/2021   8:17 AM EDT  To: Emmanuel Zapata, KRISTINA Mann,    I saw Harjeet Cannon in the office today for endocrinology follow up  Just want to make sure Dr Feliciano Enriquez saw recent MRI done at [facility] in March 2021 in case follow up needs to be arranged  Thanks

## 2021-06-15 ENCOUNTER — DOCUMENTATION (OUTPATIENT)
Dept: ENDOCRINOLOGY | Facility: HOSPITAL | Age: 66
End: 2021-06-15

## 2021-07-15 ENCOUNTER — OFFICE VISIT (OUTPATIENT)
Dept: NEUROSURGERY | Facility: CLINIC | Age: 66
End: 2021-07-15
Payer: COMMERCIAL

## 2021-07-15 VITALS
HEART RATE: 48 BPM | TEMPERATURE: 98 F | WEIGHT: 159.4 LBS | SYSTOLIC BLOOD PRESSURE: 136 MMHG | BODY MASS INDEX: 22.82 KG/M2 | RESPIRATION RATE: 16 BRPM | DIASTOLIC BLOOD PRESSURE: 82 MMHG | HEIGHT: 70 IN

## 2021-07-15 DIAGNOSIS — E22.0 ACROMEGALY (HCC): ICD-10-CM

## 2021-07-15 DIAGNOSIS — D35.2 PITUITARY MACROADENOMA (HCC): Primary | ICD-10-CM

## 2021-07-15 PROCEDURE — 99213 OFFICE O/P EST LOW 20 MIN: CPT | Performed by: NEUROLOGICAL SURGERY

## 2021-07-15 NOTE — PROGRESS NOTES
DISCUSSION SUMMARY  This is a 76 y o  male who is status post a transsphenoidal resection of a pituitary macroadenoma  This was an IGF-1 secreting tumor producing acromegaly  Today's IGF 1 is very minimally elevated  His ophthalmologic studies including visual fields and OCT are dramatically improved  His MRI demonstrates slight reduction in the overall mass effect from the tumor  I have recommended continued surveillance on a yearly basis  Return in about 1 year (around 7/15/2022)  Diagnosis ICD-10-CM Associated Orders   1  Pituitary macroadenoma (HCC)  D35 2 Insulin-like growth factor 1 (IGF-1)     Cortisol Level, AM Specimen     MRI brain pituitary wo and w contrast     Growth hormone   2  Acromegaly ([address] )  E22 0 Insulin-like growth factor 1 (IGF-1)     Cortisol Level, AM Specimen     MRI brain pituitary wo and w contrast     Growth hormone          Chief Complaint   Patient presents with    Follow-up     F/U with MRI brain 3/16/21 at [facility]  Shahrzad Galeas Lowers is known to our office for the followin18 (GRISELDA) Image guided endoscopic transsphenoidal approach for debulking of pituitary macroadenoma, abdominal fat graft harvesting  Final Path Dx: Pituitary adenoma dominated by somatotroph elements with minor lactotroph component, Ki-67 labeling index < 1%, without overexpression of p53    Last seen on 12/3/19 and his residual pituitary macroadenoma was stable and did not cause any compression of the overlying optic chiasm at the time  We discussed exhausting medication therapy prior to consideration of additional surgical/radiation therapy  Specifically, he is on cabergoline which was increased but there is no repeat lab after 30-90 days on the increased dose      Consideration of Lanreotide (produced tenesmus but decreased IGF1 to 233) retrial with life style modifications, or Octreotide or Pegvisomant     We discussed injectables and his initial reluctance, but given the alternatives he agreed to reconsider all of his options      If there is complete failure/intolerance of medication regimen, then a repeat surgical debulking followed by radiation to the residual would be indicated  Surgery cannot be curative because of the presence of tumor in the cavernous sinus  The logic behind the surgical debulking prior to [address] 42 falls in minimizing the stereotactic target thereby reducing the radiation effect on the remaining native hormone production  Denies headaches  He has flying  He is overall doing very well  Review of Systems   Constitutional: Negative  HENT: Negative  Eyes: Negative  Respiratory: Negative  Cardiovascular: Negative  Gastrointestinal: Negative  Endocrine: Negative  Genitourinary: Negative  Musculoskeletal: Negative  Skin: Negative  Allergic/Immunologic: Negative  Neurological: Negative  Hematological: Negative  Psychiatric/Behavioral: Negative  All other systems reviewed and are negative  I reviewed the ROS        Vitals:    /82 (BP Location: Left arm, Patient Position: Sitting, Cuff Size: Standard)   Pulse (!) 48   Temp 98 °F (36 7 °C) (Probe)   Resp 16   Ht 5' 9 5" (1 765 m)   Wt 72 3 kg (159 lb 6 4 oz)   BMI 23 20 kg/m²       MEDICAL HISTORY  Past Medical History:   Diagnosis Date    Decreased stamina     Feeling tired     Glaucoma     right eye    Lipoma     Lipoma of left shoulder     Muscle weakness     Open-angle glaucoma     left eye severe    Pituitary mass (HCC)      Past Surgical History:   Procedure Laterality Date    ANKLE SURGERY      lipoma    FINGER TENDON REPAIR      HAND SURGERY      HERNIA REPAIR Left     inguinal    KNEE ARTHROSCOPY      ID NEUROENDOSCOP,EXC,PIT MAKI,TRANSNAS/SPHEN N/A 4/25/2018    Procedure: Image guided endoscopic transsphenoidal approach for debulking of pituitary macroadenoma, abdominal fat graft harvesting;  Surgeon: Chastity Alex MD; Location: [facility];  Service: Neurosurgery    SHOULDER SURGERY      lipoma removal     Social History     Tobacco Use    Smoking status: Former Smoker     Types: Cigarettes     Quit date:      Years since quittin 5    Smokeless tobacco: Never Used    Tobacco comment: quit 15 years ago   Substance Use Topics    Alcohol use: Yes     Comment: occasional    Drug use: Never        Current Outpatient Medications:     COMBIGAN 0 2-0 5 %, INSTILL 1 DROP INTO LEFT EYE TWICE DAILY, Disp: , Rfl: 5    Cyanocobalamin (VITAMIN B 12 PO), Take 1 tablet by mouth daily, Disp: , Rfl:     latanoprost (XALATAN) 0 005 % ophthalmic solution, Administer 1 drop into the left eye daily at bedtime , Disp: , Rfl:     Multiple Vitamins-Minerals (CENTRUM ADULTS PO), Take 1 tablet by mouth daily, Disp: , Rfl:     octreotide (SandoSTATIN [place]) 20 mg kit, Inject 20 mg into a muscle every 28 days, Disp: 3 kit, Rfl: 3   No Known Allergies     The following portions of the patient's history were updated by MA and reviewed by MD: allergies, current medications, past family history, past medical history, past social history, past surgical history and problem list       Physical Exam  Vitals and nursing note reviewed  Constitutional:       General: He is not in acute distress  Appearance: Normal appearance  He is normal weight  He is not ill-appearing, toxic-appearing or diaphoretic  HENT:      Head: Normocephalic and atraumatic  Nose: Nose normal    Eyes:      Extraocular Movements: Extraocular movements intact  Pupils: Pupils are equal, round, and reactive to light  Musculoskeletal:         General: No swelling, tenderness, deformity or signs of injury  Normal range of motion  Cervical back: Normal range of motion and neck supple  Right lower leg: No edema  Left lower leg: No edema  Skin:     General: Skin is warm and dry  Neurological:      General: No focal deficit present        Mental Status: He is alert and oriented to person, place, and time  Mental status is at baseline  Cranial Nerves: No cranial nerve deficit  Sensory: No sensory deficit  Motor: No weakness  Coordination: Coordination normal       Gait: Gait ( ) normal    Psychiatric:         Mood and Affect: Mood normal          Behavior: Behavior normal          Thought Content: Thought content normal          Judgment: Judgment normal            RESULTS/DATA  I have personally reviewed pertinent films in PACS   MRI of the brain is carefully reviewed from March of 2021 and compared to a study from November of 2019  There was actually a small amount of reduction in the size of the tumor especially with regards to the tumor adjacent to the pituitary stalk        Component      Latest Ref Rng & Units 9/10/2018 9/10/2018 5/14/2019 9/20/2019           6:58 AM  7:00 AM     INSULIN-LIKE GROWTH FACTOR-1      59 - 230 ng/mL 549 (H) 578 (H) 233 (H) 574 (H)     Component      Latest Ref Rng & Units 10/29/2019 6/18/2020 9/14/2020 5/5/2021                INSULIN-LIKE GROWTH FACTOR-1      59 - 230 ng/mL 543 (H) 450 (H) 207 238 (H)     Component      Latest Ref Rng & Units 9/10/2018 12/24/2020 5/5/2021   Cortisol AM      6 2 - 19 4 ug/dL 12 4 13 2 12 6     Component      Latest Ref Rng & Units 10/29/2019 6/18/2020 9/14/2020 5/5/2021                INSULIN-LIKE GROWTH FACTOR-1      59 - 230 ng/mL 543 (H) 450 (H) 207 238 (H)     Component      Latest Ref Rng & Units 9/10/2018 9/20/2019 12/24/2020 5/5/2021   PROLACTIN      4 0 - 15 2 ng/mL 14 6 1 7 (L) 10 2 10 6     Component      Latest Ref Rng & Units 9/10/2018 12/24/2020 5/5/2021   TSH, POC      0 450 - 4 500 uIU/mL 2 940 2 610 2 600     Component      Latest Ref Rng & Units 9/10/2018 12/24/2020 5/5/2021   Free T4      0 82 - 1 77 ng/dL 1 12 1 14 1 29     Component      Latest Ref Rng & Units 9/20/2019 12/24/2020 5/5/2021   Testosterone, Total, LC/MS      264 - 916 ng/dL 159 (L) 242 (L) 281   TESTOSTERONE FREE      6 6 - 18 1 pg/mL 4 6 (L) 5 4 (L) 4 6 (L)

## 2021-08-18 ENCOUNTER — DOCUMENTATION (OUTPATIENT)
Dept: ENDOCRINOLOGY | Facility: HOSPITAL | Age: 66
End: 2021-08-18

## 2021-09-21 ENCOUNTER — DOCUMENTATION (OUTPATIENT)
Dept: ENDOCRINOLOGY | Facility: HOSPITAL | Age: 66
End: 2021-09-21

## 2021-09-25 LAB
ALBUMIN SERPL-MCNC: 4.2 G/DL (ref 3.8–4.8)
ALBUMIN/GLOB SERPL: 1.8 {RATIO} (ref 1.2–2.2)
ALP SERPL-CCNC: 58 IU/L (ref 44–121)
ALT SERPL-CCNC: 23 IU/L (ref 0–44)
AST SERPL-CCNC: 22 IU/L (ref 0–40)
BASOPHILS # BLD AUTO: 0 X10E3/UL (ref 0–0.2)
BASOPHILS NFR BLD AUTO: 0 %
BILIRUB SERPL-MCNC: 0.4 MG/DL (ref 0–1.2)
BUN SERPL-MCNC: 21 MG/DL (ref 8–27)
BUN/CREAT SERPL: 29 (ref 10–24)
CALCIUM SERPL-MCNC: 9.6 MG/DL (ref 8.6–10.2)
CHLORIDE SERPL-SCNC: 102 MMOL/L (ref 96–106)
CO2 SERPL-SCNC: 25 MMOL/L (ref 20–29)
CREAT SERPL-MCNC: 0.72 MG/DL (ref 0.76–1.27)
EOSINOPHIL # BLD AUTO: 0.2 X10E3/UL (ref 0–0.4)
EOSINOPHIL NFR BLD AUTO: 5 %
ERYTHROCYTE [DISTWIDTH] IN BLOOD BY AUTOMATED COUNT: 12.8 % (ref 11.6–15.4)
GH SERPL-MCNC: 5.9 NG/ML (ref 0–10)
GLOBULIN SER-MCNC: 2.3 G/DL (ref 1.5–4.5)
GLUCOSE SERPL-MCNC: 94 MG/DL (ref 65–99)
HCT VFR BLD AUTO: 42.9 % (ref 37.5–51)
HGB BLD-MCNC: 13.8 G/DL (ref 13–17.7)
IGF-I SERPL-MCNC: 305 NG/ML (ref 59–230)
IMM GRANULOCYTES # BLD: 0 X10E3/UL (ref 0–0.1)
IMM GRANULOCYTES NFR BLD: 0 %
LYMPHOCYTES # BLD AUTO: 1.5 X10E3/UL (ref 0.7–3.1)
LYMPHOCYTES NFR BLD AUTO: 30 %
MCH RBC QN AUTO: 30.6 PG (ref 26.6–33)
MCHC RBC AUTO-ENTMCNC: 32.2 G/DL (ref 31.5–35.7)
MCV RBC AUTO: 95 FL (ref 79–97)
MONOCYTES # BLD AUTO: 0.4 X10E3/UL (ref 0.1–0.9)
MONOCYTES NFR BLD AUTO: 8 %
NEUTROPHILS # BLD AUTO: 2.8 X10E3/UL (ref 1.4–7)
NEUTROPHILS NFR BLD AUTO: 57 %
PLATELET # BLD AUTO: 289 X10E3/UL (ref 150–450)
POTASSIUM SERPL-SCNC: 4.4 MMOL/L (ref 3.5–5.2)
PROT SERPL-MCNC: 6.5 G/DL (ref 6–8.5)
RBC # BLD AUTO: 4.51 X10E6/UL (ref 4.14–5.8)
SL AMB EGFR AFRICAN AMERICAN: 112 ML/MIN/1.73
SL AMB EGFR NON AFRICAN AMERICAN: 97 ML/MIN/1.73
SODIUM SERPL-SCNC: 139 MMOL/L (ref 134–144)
TESTOST FREE SERPL-MCNC: 4.4 PG/ML (ref 6.6–18.1)
TESTOST SERPL-MCNC: 204 NG/DL (ref 264–916)
WBC # BLD AUTO: 4.9 X10E3/UL (ref 3.4–10.8)

## 2021-10-25 ENCOUNTER — DOCUMENTATION (OUTPATIENT)
Dept: ENDOCRINOLOGY | Facility: HOSPITAL | Age: 66
End: 2021-10-25

## 2021-12-09 ENCOUNTER — TELEPHONE (OUTPATIENT)
Dept: ENDOCRINOLOGY | Facility: HOSPITAL | Age: 66
End: 2021-12-09

## 2021-12-09 ENCOUNTER — DOCUMENTATION (OUTPATIENT)
Dept: ENDOCRINOLOGY | Facility: HOSPITAL | Age: 66
End: 2021-12-09

## 2022-01-03 LAB
ALBUMIN SERPL-MCNC: 4 G/DL (ref 3.8–4.8)
ALBUMIN/GLOB SERPL: 1.7 {RATIO} (ref 1.2–2.2)
ALP SERPL-CCNC: 64 IU/L (ref 44–121)
ALT SERPL-CCNC: 18 IU/L (ref 0–44)
AST SERPL-CCNC: 20 IU/L (ref 0–40)
BILIRUB SERPL-MCNC: 0.7 MG/DL (ref 0–1.2)
BUN SERPL-MCNC: 21 MG/DL (ref 8–27)
BUN/CREAT SERPL: 30 (ref 10–24)
CALCIUM SERPL-MCNC: 9.3 MG/DL (ref 8.6–10.2)
CHLORIDE SERPL-SCNC: 104 MMOL/L (ref 96–106)
CO2 SERPL-SCNC: 23 MMOL/L (ref 20–29)
CORTIS AM PEAK SERPL-MCNC: 14.5 UG/DL (ref 6.2–19.4)
CREAT SERPL-MCNC: 0.69 MG/DL (ref 0.76–1.27)
GH SERPL-MCNC: 4.2 NG/ML (ref 0–10)
GLOBULIN SER-MCNC: 2.3 G/DL (ref 1.5–4.5)
GLUCOSE SERPL-MCNC: 107 MG/DL (ref 65–99)
IGF-I SERPL-MCNC: 231 NG/ML (ref 59–230)
POTASSIUM SERPL-SCNC: 4.3 MMOL/L (ref 3.5–5.2)
PROLACTIN SERPL-MCNC: 10.9 NG/ML (ref 4–15.2)
PROT SERPL-MCNC: 6.3 G/DL (ref 6–8.5)
SL AMB EGFR AFRICAN AMERICAN: 114 ML/MIN/1.73
SL AMB EGFR NON AFRICAN AMERICAN: 98 ML/MIN/1.73
SODIUM SERPL-SCNC: 140 MMOL/L (ref 134–144)
T4 FREE SERPL-MCNC: 1.12 NG/DL (ref 0.82–1.77)
TESTOST FREE SERPL-MCNC: 5.2 PG/ML (ref 6.6–18.1)
TESTOST SERPL-MCNC: 194 NG/DL (ref 264–916)
TSH SERPL DL<=0.005 MIU/L-ACNC: 2.56 UIU/ML (ref 0.45–4.5)

## 2022-01-25 ENCOUNTER — DOCUMENTATION (OUTPATIENT)
Dept: ENDOCRINOLOGY | Facility: HOSPITAL | Age: 67
End: 2022-01-25

## 2022-01-26 ENCOUNTER — OFFICE VISIT (OUTPATIENT)
Dept: ENDOCRINOLOGY | Facility: HOSPITAL | Age: 67
End: 2022-01-26
Payer: COMMERCIAL

## 2022-01-26 VITALS
BODY MASS INDEX: 24.6 KG/M2 | SYSTOLIC BLOOD PRESSURE: 142 MMHG | DIASTOLIC BLOOD PRESSURE: 90 MMHG | HEIGHT: 70 IN | WEIGHT: 171.8 LBS | HEART RATE: 68 BPM

## 2022-01-26 DIAGNOSIS — E23.0 HYPOGONADOTROPIC HYPOGONADISM (HCC): Primary | ICD-10-CM

## 2022-01-26 DIAGNOSIS — D35.2 PITUITARY MACROADENOMA (HCC): ICD-10-CM

## 2022-01-26 DIAGNOSIS — E22.0 ACROMEGALY (HCC): ICD-10-CM

## 2022-01-26 PROBLEM — R79.89 ELEVATED INSULIN-LIKE GROWTH FACTOR 1 (IGF-1) LEVEL: Status: RESOLVED | Noted: 2018-03-14 | Resolved: 2022-01-26

## 2022-01-26 PROBLEM — E29.1 HYPOGONADISM MALE: Status: RESOLVED | Noted: 2017-10-24 | Resolved: 2022-01-26

## 2022-01-26 PROBLEM — E23.6 PITUITARY MASS (HCC): Status: RESOLVED | Noted: 2018-04-25 | Resolved: 2022-01-26

## 2022-01-26 PROCEDURE — 99214 OFFICE O/P EST MOD 30 MIN: CPT | Performed by: INTERNAL MEDICINE

## 2022-01-26 RX ORDER — OMEGA-3 FATTY ACIDS/FISH OIL 300-1000MG
CAPSULE ORAL DAILY
COMMUNITY

## 2022-01-26 NOTE — PROGRESS NOTES
1/26/2022    Assessment/Plan      Diagnoses and all orders for this visit:    Hypogonadotropic hypogonadism ([facility] [address] )    Pituitary macroadenoma ([facility] [address] )    Acromegaly ([facility] [address] )  -     Comprehensive metabolic panel Lab Collect; Future  -     Insulin-like growth factor 1 (IGF-1) - Lab Collect; Future  -     Growth hormone; Future  -     HEMOGLOBIN A1C W/ EAG ESTIMATION Lab Collect; Future    Other orders  -     Omega 3 1000 MG CAPS; Take by mouth daily        Assessment/Plan:  1  Acromegaly:  Biochemically he is almost in the normal range  Discussed that my suggestion would be to increase his dose  Patient would prefer to monitor on current dose as there have been times we went 2 months instead of 1 months between injections  I have suggested in this regard we repeat labs in 2-3 months and call the patient with the results and see if we need to adjust his regimen at that time  Follow-up in the office in 6 months  He has follow-up with neurosurgery schedule  2  Hypogonadism: Monitor over time off of treatment  CC: Follow-up    History of Present Illness     HPI: Ashley Infante is a 78y o  year old male with history of acromegaly which was diagnosed during 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 who presents for a follow-up appointment  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  He has had residual disease below structurally on MRI as well as biochemically  Initially he was started on lanreotide 90 mg every 4 weeks  He received about 3 injections but suffer loose stools and abdominal cramping and inguinal hernia and therefore discontinue this  We did try cabergoline which did not provide adequate biochemical response  He has then been transition to octreotide LAR 20 mg every 28 days  MRI is planned to be done for monitoring through Neurosurgery  He presents today overall feeling okay  His main concerns are related to knee pain and sciatic pain    He is seeking evaluation with orthopedics  He has had a colonoscopy with GI recently and has follow-up plans in this regard  Review of Systems   Constitutional: Negative for fatigue  HENT: Negative for trouble swallowing and voice change  Eyes: Negative for visual disturbance  Respiratory: Negative for shortness of breath  Cardiovascular: Negative for palpitations and leg swelling  Gastrointestinal: Negative for abdominal pain, nausea and vomiting  Endocrine: Negative for polydipsia and polyuria  Musculoskeletal: Negative for arthralgias and myalgias  Skin: Negative for rash  Neurological: Negative for dizziness, tremors and weakness  Hematological: Negative for adenopathy  Psychiatric/Behavioral: Negative for agitation and confusion         Historical Information   Past Medical History:   Diagnosis Date    Decreased stamina     Feeling tired     Glaucoma     right eye    Lipoma     Lipoma of left shoulder     Muscle weakness     Open-angle glaucoma     left eye severe    Pituitary mass (HCC)      Past Surgical History:   Procedure Laterality Date    ANKLE SURGERY      lipoma    FINGER TENDON REPAIR      HAND SURGERY      HERNIA REPAIR Left     inguinal    KNEE ARTHROSCOPY      [place] NEUROENDOSCOP,EXC,PIT MAKI,TRANSNAS/SPHEN N/A 2018    Procedure: Image guided endoscopic transsphenoidal approach for debulking of pituitary macroadenoma, abdominal fat graft harvesting;  Surgeon: Erwin Hairston MD;  Location: [facility];  Service: Neurosurgery    SHOULDER SURGERY      lipoma removal     Social History   Social History     Substance and Sexual Activity   Alcohol Use Yes    Comment: occasional     Social History     Substance and Sexual Activity   Drug Use Never     Social History     Tobacco Use   Smoking Status Former Smoker    Types: Cigarettes    Quit date: 0    Years since quittin 0   Smokeless Tobacco Never Used   Tobacco Comment    quit 15 years ago     Family History:   Family History Problem Relation Age of Onset    No Known Problems Mother     Cancer Father        Meds/Allergies   Current Outpatient Medications   Medication Sig Dispense Refill    COMBIGAN 0 2-0 5 % INSTILL 1 DROP INTO LEFT EYE TWICE DAILY  5    Cyanocobalamin (VITAMIN B 12 PO) Take 1 tablet by mouth daily      latanoprost (XALATAN) 0 005 % ophthalmic solution Administer 1 drop into the left eye daily at bedtime       Multiple Vitamins-Minerals (CENTRUM ADULTS PO) Take 1 tablet by mouth daily      octreotide (SandoSTATIN LAR [place]) 20 mg kit Inject 20 mg into a muscle every 28 days 3 kit 3    Omega 3 1000 MG CAPS Take by mouth daily       No current facility-administered medications for this visit  No Known Allergies    Objective   Vitals: Blood pressure 142/90, pulse 68, height 5' 9 5" (1 765 m), weight 77 9 kg (171 lb 12 8 oz)  Invasive Devices  Report    None                 Physical Exam  Vitals reviewed  Constitutional:       General: He is not in acute distress  Appearance: He is well-developed  He is not diaphoretic  HENT:      Head: Normocephalic and atraumatic  Eyes:      Conjunctiva/sclera: Conjunctivae normal       Pupils: Pupils are equal, round, and reactive to light  Neck:      Thyroid: No thyromegaly  Cardiovascular:      Rate and Rhythm: Normal rate and regular rhythm  Pulmonary:      Effort: Pulmonary effort is normal  No respiratory distress  Breath sounds: Normal breath sounds  Abdominal:      General: Bowel sounds are normal       Palpations: Abdomen is soft  Musculoskeletal:         General: Normal range of motion  Cervical back: Normal range of motion and neck supple  Skin:     General: Skin is warm and dry  Findings: No rash  Neurological:      Mental Status: He is alert and oriented to person, place, and time  Motor: No abnormal muscle tone     Psychiatric:         Behavior: Behavior normal          The history was obtained from the review of the chart and from the patient      Lab Results:      Recent Results (from the past 43841 hour(s))   CBC and differential    Collection Time: 12/24/20  6:52 AM   Result Value Ref Range    White Blood Cell Count 4 8 3 4 - 10 8 x10E3/uL    Red Blood Cell Count 4 53 4 14 - 5 80 x10E6/uL    Hemoglobin 14 2 13 0 - 17 7 g/dL    HCT 43 7 37 5 - 51 0 %    MCV 97 79 - 97 fL    MCH 31 3 26 6 - 33 0 pg    MCHC 32 5 31 5 - 35 7 g/dL    RDW 12 4 11 6 - 15 4 %    Platelet Count 188 847 - 450 x10E3/uL    Neutrophils 54 Not Estab  %    Lymphocytes 35 Not Estab  %    Monocytes 8 Not Estab  %    Eosinophils 3 Not Estab  %    Basophils PCT 0 Not Estab  %    Neutrophils (Absolute) 2 6 1 4 - 7 0 x10E3/uL    Lymphocytes (Absolute) 1 7 0 7 - 3 1 x10E3/uL    Monocytes (Absolute) 0 4 0 1 - 0 9 x10E3/uL    Eosinophils (Absolute) 0 1 0 0 - 0 4 x10E3/uL    Basophils ABS 0 0 0 0 - 0 2 x10E3/uL    Immature Granulocytes 0 Not Estab  %    Immature Granulocytes (Absolute) 0 0 0 0 - 0 1 x10E3/uL   Comprehensive metabolic panel    Collection Time: 12/24/20  6:52 AM   Result Value Ref Range    Glucose, Random 104 (H) 65 - 99 mg/dL    BUN 18 8 - 27 mg/dL    Creatinine 0 76 0 76 - 1 27 mg/dL    eGFR Non African American 95 >59 mL/min/1 73    eGFR  110 >59 mL/min/1 73    SL AMB BUN/CREATININE RATIO 24 10 - 24    Sodium 140 134 - 144 mmol/L    Potassium 4 7 3 5 - 5 2 mmol/L    Chloride 101 96 - 106 mmol/L    CO2 27 20 - 29 mmol/L    CALCIUM 9 6 8 6 - 10 2 mg/dL    Protein, Total 6 7 6 0 - 8 5 g/dL    Albumin 4 3 3 8 - 4 8 g/dL    Globulin, Total 2 4 1 5 - 4 5 g/dL    Albumin/Globulin Ratio 1 8 1 2 - 2 2    TOTAL BILIRUBIN 0 4 0 0 - 1 2 mg/dL    Alk Phos Isoenzymes 58 39 - 117 IU/L    AST 20 0 - 40 IU/L    ALT 16 0 - 44 IU/L   FSH and LH    Collection Time: 12/24/20  6:52 AM   Result Value Ref Range    Luteinizing Hormone (LH) 1 9 1 7 - 8 6 mIU/mL    FSH 2 5 1 5 - 12 4 mIU/mL   Testosterone, free, total    Collection Time: 12/24/20  6:52 AM Result Value Ref Range    TESTOSTERONE TOTAL 242 (L) 264 - 916 ng/dL    Testosterone, Free 5 4 (L) 6 6 - 18 1 pg/mL   T4, free    Collection Time: 12/24/20  6:52 AM   Result Value Ref Range    Free t4 1 14 0 82 - 1 77 ng/dL   TSH, 3rd generation    Collection Time: 12/24/20  6:52 AM   Result Value Ref Range    TSH 2 610 0 450 - 4 500 uIU/mL   Prolactin    Collection Time: 12/24/20  6:52 AM   Result Value Ref Range    Prolactin 10 2 4 0 - 15 2 ng/mL   PSA Total, Diagnostic    Collection Time: 12/24/20  6:52 AM   Result Value Ref Range    Prostate Specific Antigen Total 4 0 0 0 - 4 0 ng/mL   Growth hormone    Collection Time: 12/24/20  6:52 AM   Result Value Ref Range    Growth Hormone 2 5 0 0 - 10 0 ng/mL   Cortisol Level, AM Specimen    Collection Time: 12/24/20  6:52 AM   Result Value Ref Range    Cortisol AM 13 2 6 2 - 19 4 ug/dL   BUN+Creat([facility])    Collection Time: 03/05/21  6:46 AM   Result Value Ref Range    BUN 18 8 - 27 mg/dL    Creatinine 0 76 0 76 - 1 27 mg/dL    eGFR Non African American 95 >59 mL/min/1 73    eGFR  110 >59 mL/min/1 73    SL AMB BUN/CREATININE RATIO 24 10 - 24   Tissue Exam    Collection Time: 03/08/21  9:44 AM   Result Value Ref Range    Case Report       Surgical Pathology Report                         Case: [id number]                                   Authorizing Provider:  Ricki Mohs, DO          Collected:           03/08/2021 5904              Ordering Location:     [facility] Received:            03/08/2021 2055              Pathologist:           [id number] Rob Boyer MD                                                        Specimens:   A) - Colon, Splenic Flexure Polpy Biopsy                                                            B) - Colon, Rectal Polyp Biopsy                                                            Final Diagnosis       A   Colon, Splenic Flexure Polpy Biopsy:  - Portion of benign colonic mucosa without significant microscopic abnormality  B  Colon, Rectal Polyp Biopsy:  - Tubular adenoma  - Negative for high grade dysplasia/ carcinoma  Additional Information       All reported additional testing was performed with appropriately reactive controls  These tests were developed and their performance characteristics determined by [facility] or appropriate performing facility, though some tests may be performed on tissues which have not been validated for performance characteristics (such as staining performed on alcohol exposed cell blocks and decalcified tissues)  Results should be interpreted with caution and in the context of the patients clinical condition  These tests may not be cleared or approved by the [place]  Food and Drug Administration, though the FDA has determined that such clearance or approval is not necessary  These tests are used for clinical purposes and they should not be regarded as investigational or for research  This laboratory has been approved by [place] [id number], designated as a high-complexity laboratory and is qualified to perform these tests  Interpretation performed at [facility], [facility] [address]      Synoptic Checklist         (COLON/RECTUM POLYP FORM - GI - All Specimens)             : Adenoma(s)      Gross Description          A  The specimen is received in formalin, labeled with the patient's name and hospital number, and is designated "splenic flexure polyp  The specimen consists of 2 tan-red, focally friable soft tissue fragments measuring 0 1 and 0 4 cm in greatest dimension  The specimen is entirely submitted in a screened cassette  B  The specimen is received in formalin, labeled with the patient's name and hospital number, and is designated "rectal polyp biopsy  The specimen consists of a single tan-pink soft tissue fragment measuring 0 4 cm in greatest dimension    The specimen is entirely submitted in a screened cassette  Note: The estimated total formalin fixation time based upon information provided by the submitting clinician and the standard processing schedule is under 72 hours    Adrianacharlene         CBC and differential    Collection Time: 05/05/21  6:35 AM   Result Value Ref Range    White Blood Cell Count 4 9 3 4 - 10 8 x10E3/uL    Red Blood Cell Count 4 49 4 14 - 5 80 x10E6/uL    Hemoglobin 14 2 13 0 - 17 7 g/dL    HCT 42 6 37 5 - 51 0 %    MCV 95 79 - 97 fL    MCH 31 6 26 6 - 33 0 pg    MCHC 33 3 31 5 - 35 7 g/dL    RDW 12 9 11 6 - 15 4 %    Platelet Count 660 792 - 450 x10E3/uL    Neutrophils 58 Not Estab  %    Lymphocytes 28 Not Estab  %    Monocytes 9 Not Estab  %    Eosinophils 4 Not Estab  %    Basophils PCT 1 Not Estab  %    Neutrophils (Absolute) 2 8 1 4 - 7 0 x10E3/uL    Lymphocytes (Absolute) 1 4 0 7 - 3 1 x10E3/uL    Monocytes (Absolute) 0 5 0 1 - 0 9 x10E3/uL    Eosinophils (Absolute) 0 2 0 0 - 0 4 x10E3/uL    Basophils ABS 0 0 0 0 - 0 2 x10E3/uL    Immature Granulocytes 0 Not Estab  %    Immature Granulocytes (Absolute) 0 0 0 0 - 0 1 x10E3/uL   Comprehensive metabolic panel    Collection Time: 05/05/21  6:35 AM   Result Value Ref Range    Glucose, Random 107 (H) 65 - 99 mg/dL    BUN 21 8 - 27 mg/dL    Creatinine 0 79 0 76 - 1 27 mg/dL    eGFR Non  94 >59 mL/min/1 73    eGFR  108 >59 mL/min/1 73    SL AMB BUN/CREATININE RATIO 27 (H) 10 - 24    Sodium 139 134 - 144 mmol/L    Potassium 4 3 3 5 - 5 2 mmol/L    Chloride 103 96 - 106 mmol/L    CO2 23 20 - 29 mmol/L    CALCIUM 9 3 8 6 - 10 2 mg/dL    Protein, Total 6 3 6 0 - 8 5 g/dL    Albumin 4 2 3 8 - 4 8 g/dL    Globulin, Total 2 1 1 5 - 4 5 g/dL    Albumin/Globulin Ratio 2 0 1 2 - 2 2    TOTAL BILIRUBIN 0 5 0 0 - 1 2 mg/dL    Alk Phos Isoenzymes 61 39 - 117 IU/L    AST 22 0 - 40 IU/L    ALT 26 0 - 44 IU/L   Testosterone, free, total    Collection Time: 05/05/21  6:35 AM   Result Value Ref Range    TESTOSTERONE TOTAL 281 264 - 916 ng/dL    Testosterone, Free 4 6 (L) 6 6 - 18 1 pg/mL   T4, free    Collection Time: 05/05/21  6:35 AM   Result Value Ref Range    Free t4 1 29 0 82 - 1 77 ng/dL   TSH, 3rd generation    Collection Time: 05/05/21  6:35 AM   Result Value Ref Range    TSH 2 600 0 450 - 4 500 uIU/mL   Prolactin    Collection Time: 05/05/21  6:35 AM   Result Value Ref Range    Prolactin 10 6 4 0 - 15 2 ng/mL   Insulin-like growth factor 1 (IGF-1)    Collection Time: 05/05/21  6:35 AM   Result Value Ref Range    Insulin-Like GF-1 238 (H) 59 - 230 ng/mL   Cortisol Level, AM Specimen    Collection Time: 05/05/21  6:35 AM   Result Value Ref Range    Cortisol AM 12 6 6 2 - 19 4 ug/dL   CBC and differential    Collection Time: 09/23/21  6:56 AM   Result Value Ref Range    White Blood Cell Count 4 9 3 4 - 10 8 x10E3/uL    Red Blood Cell Count 4 51 4 14 - 5 80 x10E6/uL    Hemoglobin 13 8 13 0 - 17 7 g/dL    HCT 42 9 37 5 - 51 0 %    MCV 95 79 - 97 fL    MCH 30 6 26 6 - 33 0 pg    MCHC 32 2 31 5 - 35 7 g/dL    RDW 12 8 11 6 - 15 4 %    Platelet Count 807 484 - 450 x10E3/uL    Neutrophils 57 Not Estab  %    Lymphocytes 30 Not Estab  %    Monocytes 8 Not Estab  %    Eosinophils 5 Not Estab  %    Basophils PCT 0 Not Estab  %    Neutrophils (Absolute) 2 8 1 4 - 7 0 x10E3/uL    Lymphocytes (Absolute) 1 5 0 7 - 3 1 x10E3/uL    Monocytes (Absolute) 0 4 0 1 - 0 9 x10E3/uL    Eosinophils (Absolute) 0 2 0 0 - 0 4 x10E3/uL    Basophils ABS 0 0 0 0 - 0 2 x10E3/uL    Immature Granulocytes 0 Not Estab  %    Immature Granulocytes (Absolute) 0 0 0 0 - 0 1 x10E3/uL   Comprehensive metabolic panel    Collection Time: 09/23/21  6:56 AM   Result Value Ref Range    Glucose, Random 94 65 - 99 mg/dL    BUN 21 8 - 27 mg/dL    Creatinine 0 72 (L) 0 76 - 1 27 mg/dL    eGFR Non African American 97 >59 mL/min/1 73    eGFR  112 >59 mL/min/1 73    SL AMB BUN/CREATININE RATIO 29 (H) 10 - 24    Sodium 139 134 - 144 mmol/L    Potassium 4 4 3 5 - 5 2 mmol/L Chloride 102 96 - 106 mmol/L    CO2 25 20 - 29 mmol/L    CALCIUM 9 6 8 6 - 10 2 mg/dL    Protein, Total 6 5 6 0 - 8 5 g/dL    Albumin 4 2 3 8 - 4 8 g/dL    Globulin, Total 2 3 1 5 - 4 5 g/dL    Albumin/Globulin Ratio 1 8 1 2 - 2 2    TOTAL BILIRUBIN 0 4 0 0 - 1 2 mg/dL    Alk Phos Isoenzymes 58 44 - 121 IU/L    AST 22 0 - 40 IU/L    ALT 23 0 - 44 IU/L   Testosterone, free, total    Collection Time: 09/23/21  6:56 AM   Result Value Ref Range    TESTOSTERONE TOTAL 204 (L) 264 - 916 ng/dL    Testosterone, Free 4 4 (L) 6 6 - 18 1 pg/mL   Insulin-like growth factor 1 (IGF-1)    Collection Time: 09/23/21  6:56 AM   Result Value Ref Range    Insulin-Like GF-1 305 (H) 59 - 230 ng/mL   Growth hormone    Collection Time: 09/23/21  6:56 AM   Result Value Ref Range    Growth Hormone 5 9 0 0 - 10 0 ng/mL   Comprehensive metabolic panel    Collection Time: 12/30/21  6:56 AM   Result Value Ref Range    Glucose, Random 107 (H) 65 - 99 mg/dL    BUN 21 8 - 27 mg/dL    Creatinine 0 69 (L) 0 76 - 1 27 mg/dL    eGFR Non  98 >59 mL/min/1 73    eGFR  114 >59 mL/min/1 73    SL AMB BUN/CREATININE RATIO 30 (H) 10 - 24    Sodium 140 134 - 144 mmol/L    Potassium 4 3 3 5 - 5 2 mmol/L    Chloride 104 96 - 106 mmol/L    CO2 23 20 - 29 mmol/L    CALCIUM 9 3 8 6 - 10 2 mg/dL    Protein, Total 6 3 6 0 - 8 5 g/dL    Albumin 4 0 3 8 - 4 8 g/dL    Globulin, Total 2 3 1 5 - 4 5 g/dL    Albumin/Globulin Ratio 1 7 1 2 - 2 2    TOTAL BILIRUBIN 0 7 0 0 - 1 2 mg/dL    Alk Phos Isoenzymes 64 44 - 121 IU/L    AST 20 0 - 40 IU/L    ALT 18 0 - 44 IU/L   Testosterone, free, total    Collection Time: 12/30/21  6:56 AM   Result Value Ref Range    TESTOSTERONE TOTAL 194 (L) 264 - 916 ng/dL    Testosterone, Free 5 2 (L) 6 6 - 18 1 pg/mL   T4, free    Collection Time: 12/30/21  6:56 AM   Result Value Ref Range    Free t4 1 12 0 82 - 1 77 ng/dL   TSH, 3rd generation    Collection Time: 12/30/21  6:56 AM   Result Value Ref Range TSH 2 560 0 450 - 4 500 uIU/mL   Prolactin    Collection Time: 12/30/21  6:56 AM   Result Value Ref Range    Prolactin 10 9 4 0 - 15 2 ng/mL   Insulin-like growth factor 1 (IGF-1)    Collection Time: 12/30/21  6:56 AM   Result Value Ref Range    Insulin-Like GF-1 231 (H) 59 - 230 ng/mL   Growth hormone    Collection Time: 12/30/21  6:56 AM   Result Value Ref Range    Growth Hormone 4 2 0 0 - 10 0 ng/mL   Cortisol Level, AM Specimen    Collection Time: 12/30/21  6:56 AM   Result Value Ref Range    Cortisol AM 14 5 6 2 - 19 4 ug/dL         Future Appointments   Date Time Provider Trell Radha   7/21/2022  3:00 PM Nadia Arroyo MD NEURO [facility]       Portions of the record may have been created with voice recognition software  Occasional wrong word or "sound a like" substitutions may have occurred due to the inherent limitations of voice recognition software  Read the chart carefully and recognize, using context, where substitutions have occurred

## 2022-03-10 ENCOUNTER — DOCUMENTATION (OUTPATIENT)
Dept: ENDOCRINOLOGY | Facility: HOSPITAL | Age: 67
End: 2022-03-10

## 2022-04-18 ENCOUNTER — TELEPHONE (OUTPATIENT)
Dept: ENDOCRINOLOGY | Facility: HOSPITAL | Age: 67
End: 2022-04-18

## 2022-04-18 NOTE — TELEPHONE ENCOUNTER
The [address] [place] [address]  pulled the patient's flying license due to medical conditions  He needs a letter from you stating that his monthly medication keeps his levels normal and that his condition in not a hindrance for operating an aircraft  Let patient know when ready

## 2022-04-19 ENCOUNTER — TELEPHONE (OUTPATIENT)
Dept: NEUROSURGERY | Facility: CLINIC | Age: 67
End: 2022-04-19

## 2022-04-19 NOTE — TELEPHONE ENCOUNTER
Received faxed clearance request from Dr Rajesh Manning office looking for clearance for patient to undergo right total knee replacement  Noted patient was last seen in July of 2021 and following up in 1 year with MRI  Will route a message to Dr Jax Gomez to determine if Doreen Leyden is needed prior to providing clearance

## 2022-04-19 NOTE — TELEPHONE ENCOUNTER
Have tried calling patient several times  Always says "the person you are calling is unavailable, try again later"

## 2022-04-20 NOTE — TELEPHONE ENCOUNTER
Still cannot get thru on cell phone  Left message on his home phone  Not sure if it went thru, never got a beep

## 2022-05-12 ENCOUNTER — DOCUMENTATION (OUTPATIENT)
Dept: NEUROSURGERY | Facility: CLINIC | Age: 67
End: 2022-05-12

## 2022-05-12 NOTE — PROGRESS NOTES
Received fax from Dr Charlette Mejias office requesting clearance from [address] [place] [address] to proceed with TKR  Will discuss with Dr Darylene Doss and provide letter of clearance as able  normal

## 2022-05-12 NOTE — TELEPHONE ENCOUNTER
Spoke to Dr Zeb Bailey directly  He would like to review patients chart before providing this letter  Will provide letter as able

## 2022-05-13 NOTE — TELEPHONE ENCOUNTER
Received response from Dr Indira Ca  [id number] [address] to proceed with TKR from neurosurgical perspective  Faxed this letter to Dr Alexander Farr at [phone]

## 2022-05-16 PROBLEM — Z01.810 ENCOUNTER FOR PRE-OPERATIVE CARDIOVASCULAR CLEARANCE: Status: ACTIVE | Noted: 2022-05-16

## 2022-05-16 NOTE — PROGRESS NOTES
Pre-Operative   Consult Note         Reason for visit:   Chief Complaint   Patient presents with   • Initial Evaluation       HPI     Zack Ramirez comes to the office today for preoperative cardiac evaluation prior to a right hip with Dr. Cantor on 5/21/22.     He has a PMH of arthitis. He denies any cardiac history in his family.     FLP from [facility] completed 2/10/21: , Trigs 149, HDL 71,     Today he reports feeling well. He worked as a abdulaziz for many years. He does not exercise regularly. He reports that his back, hip and knee pain limit his activity. He reports that he can climb a flight of stairs and walk at least a city block in distance without chest discomfort or shortness of breath.He denies any chest pain, palpitations, shortness of breath, PND, light-headedness, syncope or stroke-like symptoms.        Pertinent lab results reviewed.    Past Medical History:   Diagnosis Date   • Arthritis    • Pituitary tumor        History reviewed. No pertinent surgical history.    Social History     Tobacco Use   Smoking Status Never Smoker   Smokeless Tobacco Never Used     Social History     Tobacco Use   • Smoking status: Never Smoker   • Smokeless tobacco: Never Used   Substance Use Topics   • Alcohol use: Yes     Alcohol/week: 6.0 standard drinks     Types: 6 Cans of beer per week   • Drug use: Not Currently       History reviewed. No pertinent family history.    Allergies:  Patient has no known allergies.    Current Outpatient Medications   Medication Sig Dispense Refill   • latanoprost (XALATAN) 0.005 % ophthalmic solution 1 drop nightly.     • octreotide acetate (SANDOSTATIN INJ) Inject as directed. Monthly injection.       No current facility-administered medications for this visit.       Review of Systems   Cardiovascular: Negative for chest pain, dyspnea on exertion, leg swelling, near-syncope, orthopnea, palpitations, paroxysmal nocturnal dyspnea and syncope.   Musculoskeletal: Positive for  arthritis, back pain and joint pain.   Neurological: Negative for dizziness and light-headedness.       Objective     Vitals:    05/19/22 0927   BP: 116/74   Pulse: (!) 53   SpO2: 98%       Wt Readings from Last 1 Encounters:   05/19/22 78.9 kg (174 lb)       Physical Exam  Constitutional: Well-developed and well-nourished. No distress.   HENT: Normocephalic and atraumatic. Moist mucous membranes.  Eyes: EOM are normal. Pupils are equal, round, and reactive to light.   Neck: No JVD present. No carotid bruits.  Cardiovascular: Normal rate and regular rhythm. No murmur, rub or gallop. Intact and symmetrical distal pulses.  Pulmonary/Chest: Normal effort and air entry. No wheezing, rales, ronchi.  Abdominal: Normal bowel sounds. Soft, non tender, no distension. No rebound or guarding. Musculoskeletal: No lower extremity edema or deformity.   Neurological: Alert and oriented to person, place, and time.   Skin: Skin is warm and dry. No rash.  Psychiatric: Normal mood, affect and behavior.    ECG   sinus bradycardia, nonspecific T wave changes    The patient has a Duke Activity Status Index score of 31.45, corresponding to an expected exertional capacity of 6.61 METS.      Assessment/Plan     Hyperlipidemia   on February 2021 check  NLA handout on nonpharmacologic lipid-lowering given to patient and discussed  10-year ASCVD risk of 10.9% calculated  He does not wish to start statin at this point  Extensive discussion of coronary calcium score.  I showed him pictures of coronary calcifications versus someone without we discussed the utility of this risk stratifying tool    He lives an hour away and prefers to have the test done at [facility] if possible.  I wrote it down for him on the NLA paper and he will discuss with his primary doctor    Abnormal ECG  Non-specific ECG finding  In the absence of symptoms and given this is a non-specific finding, no further workup indicated at this time      Encounter for  pre-operative cardiovascular clearance  Pre-operative cardiovascular risk assessment  - pt is planned for R hip surgery with Dr. Cantor on 5/24/22  - Pt has no active cardiopulmonary symptoms  - Pt has good functional capacity; somewhat limited due to orthopedic injury but able to walk 2 blocks or climb a flight of stairs without cardiopulmonary limitation  - using the Asher risk stratification tool, pt has less than 1% risk of major adverse cardiac event  - additional testing or intervention from our standpoint would not     - pt is an appropriate candidate for surgery from our perspective             Jefferson ALARCON, am scribing for, and in the presence of, Howard Chisholm MD.    IHoward MD, personally performed the services described in this documentation as scribed by Jefferson Magaña in my presence, and it is both accurate and complete.       Howard Chisholm MD  5/19/2022

## 2022-05-19 ENCOUNTER — OFFICE VISIT (OUTPATIENT)
Dept: CARDIOLOGY | Facility: CLINIC | Age: 67
End: 2022-05-19
Payer: COMMERCIAL

## 2022-05-19 ENCOUNTER — TRANSCRIBE ORDERS (OUTPATIENT)
Dept: REGISTRATION | Facility: HOSPITAL | Age: 67
End: 2022-05-19

## 2022-05-19 ENCOUNTER — OFFICE VISIT (OUTPATIENT)
Dept: PREADMISSION TESTING | Facility: HOSPITAL | Age: 67
End: 2022-05-19
Attending: ORTHOPAEDIC SURGERY
Payer: COMMERCIAL

## 2022-05-19 ENCOUNTER — APPOINTMENT (OUTPATIENT)
Dept: LAB | Facility: HOSPITAL | Age: 67
End: 2022-05-19
Attending: ORTHOPAEDIC SURGERY
Payer: COMMERCIAL

## 2022-05-19 VITALS
HEART RATE: 53 BPM | DIASTOLIC BLOOD PRESSURE: 74 MMHG | HEIGHT: 70 IN | SYSTOLIC BLOOD PRESSURE: 116 MMHG | OXYGEN SATURATION: 98 % | BODY MASS INDEX: 24.91 KG/M2 | WEIGHT: 174 LBS

## 2022-05-19 VITALS
HEART RATE: 52 BPM | BODY MASS INDEX: 24.92 KG/M2 | SYSTOLIC BLOOD PRESSURE: 134 MMHG | TEMPERATURE: 98.4 F | HEIGHT: 70 IN | DIASTOLIC BLOOD PRESSURE: 81 MMHG | OXYGEN SATURATION: 99 % | WEIGHT: 174.1 LBS | RESPIRATION RATE: 16 BRPM

## 2022-05-19 DIAGNOSIS — H40.9 GLAUCOMA, UNSPECIFIED GLAUCOMA TYPE, UNSPECIFIED LATERALITY: ICD-10-CM

## 2022-05-19 DIAGNOSIS — Z01.818 PREOP TESTING: Primary | ICD-10-CM

## 2022-05-19 DIAGNOSIS — M16.11 UNILATERAL PRIMARY OSTEOARTHRITIS, RIGHT HIP: Primary | ICD-10-CM

## 2022-05-19 DIAGNOSIS — M16.11 UNILATERAL PRIMARY OSTEOARTHRITIS, RIGHT HIP: ICD-10-CM

## 2022-05-19 DIAGNOSIS — Z87.898 HISTORY OF PITUITARY TUMOR: ICD-10-CM

## 2022-05-19 DIAGNOSIS — Z01.810 ENCOUNTER FOR PRE-OPERATIVE CARDIOVASCULAR CLEARANCE: Primary | ICD-10-CM

## 2022-05-19 DIAGNOSIS — R94.31 ABNORMAL ECG: ICD-10-CM

## 2022-05-19 DIAGNOSIS — M16.11 PRIMARY OSTEOARTHRITIS OF RIGHT HIP: ICD-10-CM

## 2022-05-19 PROBLEM — E78.5 HYPERLIPIDEMIA: Status: ACTIVE | Noted: 2022-05-19

## 2022-05-19 LAB
ABO + RH BLD: NORMAL
ANION GAP SERPL CALC-SCNC: 6 MEQ/L (ref 3–15)
APTT PPP: 30 SEC (ref 23–35)
BLD GP AB SCN SERPL QL: NEGATIVE
BUN SERPL-MCNC: 15 MG/DL (ref 8–20)
CALCIUM SERPL-MCNC: 9.9 MG/DL (ref 8.9–10.3)
CHLORIDE SERPL-SCNC: 105 MEQ/L (ref 98–109)
CO2 SERPL-SCNC: 27 MEQ/L (ref 22–32)
CREAT SERPL-MCNC: 0.5 MG/DL (ref 0.8–1.3)
D AG BLD QL: POSITIVE
ERYTHROCYTE [DISTWIDTH] IN BLOOD BY AUTOMATED COUNT: 13.8 % (ref 11.6–14.4)
EST. AVERAGE GLUCOSE BLD GHB EST-MCNC: 114 MG/DL
GFR SERPL CREATININE-BSD FRML MDRD: >60 ML/MIN/1.73M*2
GLUCOSE SERPL-MCNC: 97 MG/DL (ref 70–99)
HBA1C MFR BLD HPLC: 5.6 %
HCT VFR BLDCO AUTO: 42.4 % (ref 40.1–51)
HGB BLD-MCNC: 14.5 G/DL (ref 13.7–17.5)
INR PPP: 1
LABORATORY COMMENT REPORT: NORMAL
MCH RBC QN AUTO: 32.4 PG (ref 28–33.2)
MCHC RBC AUTO-ENTMCNC: 34.2 G/DL (ref 32.2–36.5)
MCV RBC AUTO: 94.9 FL (ref 83–98)
PDW BLD AUTO: 10 FL (ref 9.4–12.4)
PLATELET # BLD AUTO: 268 K/UL (ref 150–350)
POTASSIUM SERPL-SCNC: 4.2 MEQ/L (ref 3.6–5.1)
PROTHROMBIN TIME: 13 SEC (ref 12.2–14.5)
RBC # BLD AUTO: 4.47 M/UL (ref 4.5–5.8)
SARS-COV-2 RNA RESP QL NAA+PROBE: NEGATIVE
SODIUM SERPL-SCNC: 138 MEQ/L (ref 136–144)
SPECIMEN EXP DATE BLD: NORMAL
WBC # BLD AUTO: 4.7 K/UL (ref 3.8–10.5)

## 2022-05-19 PROCEDURE — 85610 PROTHROMBIN TIME: CPT

## 2022-05-19 PROCEDURE — U0003 INFECTIOUS AGENT DETECTION BY NUCLEIC ACID (DNA OR RNA); SEVERE ACUTE RESPIRATORY SYNDROME CORONAVIRUS 2 (SARS-COV-2) (CORONAVIRUS DISEASE [COVID-19]), AMPLIFIED PROBE TECHNIQUE, MAKING USE OF HIGH THROUGHPUT TECHNOLOGIES AS DESCRIBED BY CMS-2020-01-R: HCPCS | Performed by: HOSPITALIST

## 2022-05-19 PROCEDURE — 99204 OFFICE O/P NEW MOD 45 MIN: CPT | Performed by: HOSPITALIST

## 2022-05-19 PROCEDURE — 36415 COLL VENOUS BLD VENIPUNCTURE: CPT

## 2022-05-19 PROCEDURE — 85027 COMPLETE CBC AUTOMATED: CPT

## 2022-05-19 PROCEDURE — 85730 THROMBOPLASTIN TIME PARTIAL: CPT

## 2022-05-19 PROCEDURE — 3008F BODY MASS INDEX DOCD: CPT | Performed by: HOSPITALIST

## 2022-05-19 PROCEDURE — 83036 HEMOGLOBIN GLYCOSYLATED A1C: CPT

## 2022-05-19 PROCEDURE — 3008F BODY MASS INDEX DOCD: CPT | Performed by: INTERNAL MEDICINE

## 2022-05-19 PROCEDURE — 80048 BASIC METABOLIC PNL TOTAL CA: CPT

## 2022-05-19 PROCEDURE — 93000 ELECTROCARDIOGRAM COMPLETE: CPT | Performed by: INTERNAL MEDICINE

## 2022-05-19 PROCEDURE — 99204 OFFICE O/P NEW MOD 45 MIN: CPT | Performed by: INTERNAL MEDICINE

## 2022-05-19 PROCEDURE — 86900 BLOOD TYPING SEROLOGIC ABO: CPT

## 2022-05-19 RX ORDER — BRIMONIDINE TARTRATE AND TIMOLOL MALEATE 2; 5 MG/ML; MG/ML
1 SOLUTION OPHTHALMIC EVERY MORNING
COMMUNITY

## 2022-05-19 RX ORDER — VITAMIN B COMPLEX
1 CAPSULE ORAL EVERY MORNING
COMMUNITY

## 2022-05-19 RX ORDER — LATANOPROST 50 UG/ML
1 SOLUTION/ DROPS OPHTHALMIC NIGHTLY
COMMUNITY

## 2022-05-19 ASSESSMENT — ENCOUNTER SYMPTOMS
BACK PAIN: 1
DYSPNEA ON EXERTION: 0
PALPITATIONS: 0
DIZZINESS: 0
ORTHOPNEA: 0
PND: 0
NEAR-SYNCOPE: 0
LIGHT-HEADEDNESS: 0
SYNCOPE: 0

## 2022-05-19 ASSESSMENT — PAIN SCALES - GENERAL: PAINLEVEL: 0-NO PAIN

## 2022-05-19 NOTE — ASSESSMENT & PLAN NOTE
Bradycardic without symptoms  Seen by [facility] in cardiology for pre-op -- no further cardiac testing required pre-op

## 2022-05-19 NOTE — ASSESSMENT & PLAN NOTE
Pre-operative cardiovascular risk assessment  - pt is planned for R hip surgery with Dr. Cantor on 5/24/22  - Pt has no active cardiopulmonary symptoms  - Pt has good functional capacity; somewhat limited due to orthopedic injury but able to walk 2 blocks or climb a flight of stairs without cardiopulmonary limitation  - using the Asher risk stratification tool, pt has less than 1% risk of major adverse cardiac event  - additional testing or intervention from our standpoint would not     - pt is an appropriate candidate for surgery from our perspective

## 2022-05-19 NOTE — PRE-PROCEDURE INSTRUCTIONS
1. You will be called between 3pm-7pm one business day before your procedure to tell you where and when to report. If you do not receive a phone call by 7pm, please call the Admissions office at [phone].    2. Please report to Admissions or Short Procedure Unit on the day of your procedure.   Detailed directions will be given to you when you are called with arrival time.        3. Please follow the following fasting guidelines:     No solid food EIGHT HOURS prior to surgery.  Unlimited clear liquids, meaning water or PLAIN black coffee WITHOUT any milk, cream, sugar, or sweetener are permitted up to TWO HOURS prior to arrival at the hospital.    4. Early on the morning of the procedure please take your usual dose of the listed medications with a sip of water:  Per Dr. Forrester, take your Combigan eye drops on morning of surgery.  Hold your vitamins starting today.    5. Other Instructions: You may brush your teeth the morning of the procedure. Rinse and spit, do not swallow.  Bring a list of your medications with dosages.  Use surgical wash as directed.     6. If you develop a cold, cough, fever, rash, or other symptom prior to the day of the procedure, please report it to your physician immediately.    7. If you need to cancel the procedure for any reason, please contact your physician.    8. Make arrangements to have someone drive you home from the procedure. If you have not arranged for transportation home, your surgery may be cancelled.     9. You may not take public transportation unless accompanied by a responsible person.    10. You may not drive a car or operate complex or potentially dangerous machinery for 24 hours following anesthesia and/or sedation.    11.  If it is medically necessary for you to have a longer stay, you will be informed as soon as the decision is made.    12. Only bring essential items to the hospital.  Do not wear or bring anything of value to the hospital including jewelry of any  kind, money, or wallet. Do not wear make-up or contact lenses.  DO NOT BRING MEDICATIONS FROM HOME unless instructed to do so. DO bring your hearing aids, glasses, and a case    13. No lotion, creams, powders, or oils on skin the morning of procedure     14. Dress in comfortable clothes.    15.  If instructed, please bring a copy of your Advanced Directive (Living Will/Durable Power of ) on the day of your procedure.     16. Patients need to quarantine from the time of PAT COVID test to day of surgery, regardless of COVID vaccine status.      17. Ensuring your safety at all times is a very important part of our [facility] Culture of Safety. After having surgery and sedation, you are at risk for falling and balance issues. Although you may feel awake, the effects of the medication can last up to 24 hours after anesthesia. If you need to use the bathroom during your recovery period, nursing staff will escort you there and stay with you to ensure your safety.    18. Refrain from drinking alcohol and smoking cigarettes for 24 hours prior to surgery.    19. Shower with antibacterial soap (Dial) the night before and morning of your procedure.  If your procedure indicates the need for CHG antiseptic wash (Bactoshield or Hibiclens), please use this instead and follow instructions as discussed at the time of your Pre-Admission Testing phone interview or visit.    Above instructions reviewed with patient and patient acknowledges understanding.    Form explained by: Armida Max RN

## 2022-05-19 NOTE — ASSESSMENT & PLAN NOTE
R ANGELA planned for 5/24/2022  See below for statement in regards to perioperative risk  The patient was instructed to stop taking any NSAIDS per the surgeon's discretion  Recommend DVT prophylaxis at the discretion of the surgeon  Incentive spirometry and early ambulation post op  Post op bowel regimen  If present, recommend removal of Ross catheter as early as possible to prevent infection  Monitor CBC, BMP, and volume status in the perioperative period

## 2022-05-19 NOTE — CONSULTS
[facility] Medicine Service -  Pre-Operative Consultation       Patient Name: Zack Ramirez  Referring Surgeon: Dr. Oscar Cantor    Reason for Referral: Pre-Operative Evaluation  Surgical Procedure: right total hip arthroplasty  Operative Date: 5/24/2022    PCP: Jose England MD        HISTORY OF PRESENT ILLNESS      Zack Ramirez is a 66 y.o. male presenting today to the [facility] at [place] for pre-operative evaluation prior to planned surgery.    Regarding the current issue:  Zack describes about a year of progressively worsening right hip pain.  He has pain with work and late in the day.    He also has trouble sleeping and is more comfortable in a recliner.    He takes ibuprofen for pain.      Regarding the pre-op eval:  Overall, the patient has been feeling in good health without issues of chest pain/pressure, dyspnea, or recent hospitalizations/significant infections.     Functionally, the patient is able to ascend a flight of stairs with no dyspnea or chest pain.   Functional capacity is good,  > 4 METS.    The patient denies, on specific questioning, the following:  No history of MI, arrhythmia, valvular heart disease or CHF.  No history of KIM.  No history of DVT/PE.  No history of COPD.  No history of CVA or TIA.  No history of DM or insulin use.   No history of CKD.     No history of difficult airway/difficult intubation.  No history of adverse reaction to anesthesia in the past.    Additional comments in regards to the patient's additional medical history:    1) Hx of pituitary tumor s/p resection -- on monthly octreotide injections, managed by endocrinology      PAST MEDICAL AND SURGICAL HISTORY      Past Medical History:   Diagnosis Date   • Arthritis    • COVID-19 vaccine series declined    • Glaucoma    • Pituitary tumor        Past Surgical History:   Procedure Laterality Date   • BRAIN TUMOR EXCISION     • COLONOSCOPY     • FINGER SURGERY Left    •  "LIPOMA RESECTION Left     L shin       MEDICATIONS        Current Outpatient Medications:   •  b complex vitamins capsule, Take 1 capsule by mouth every morning., Disp: , Rfl:   •  brimonidine-timoloL (COMBIGAN) 0.2-0.5 % ophthalmic solution, Administer 1 drop into the left eye every morning., Disp: , Rfl:   •  multivitamin/iron/folic acid (CENTRUM COMPLETE ORAL), Take 1 tablet by mouth every morning., Disp: , Rfl:   •  latanoprost (XALATAN) 0.005 % ophthalmic solution, Administer 1 drop into both eyes nightly., Disp: , Rfl:   •  octreotide acetate (SANDOSTATIN INJ), Inject 20 mg as directed every 30 (thirty) days. Monthly injection.  Patient received last dose on 4/15/22., Disp: , Rfl:     ALLERGIES      Patient has no known allergies.    FAMILY HISTORY        No pertinent family history    SOCIAL HISTORY      Social History     Tobacco Use   • Smoking status: Never Smoker   • Smokeless tobacco: Never Used   Vaping Use   • Vaping Use: Never used   Substance Use Topics   • Alcohol use: Yes     Alcohol/week: 6.0 standard drinks     Types: 6 Cans of beer per week   • Drug use: Not Currently       REVIEW OF SYSTEMS      All other systems reviewed and negative except as noted in HPI    PHYSICAL EXAMINATION      Visit Vitals  /81   Pulse (!) 52   Temp 36.9 °C (98.4 °F) (Oral)   Resp 16   Ht 1.765 m (5' 9.5\")   Wt 79 kg (174 lb 1.6 oz)   SpO2 99%   BMI 25.34 kg/m²     Body mass index is 25.34 kg/m².    Physical Exam  General: well-appearing M, nontoxic appearing, no acute distress  HEENT: Moist membranes, PERRL, anicteric sclera   Neck: supple, no JVD  Cardiac: RRR, no murmur, no rub   Lungs: clear bilaterally, no wheezing/rales/rhonchi  Abdomen: soft, NT/ND, +BS, no rebound/guarding   Extremities: no edema, distal perfusion intact  MSK: restricted ROM of right hip on flexion  Neuro: AAOx3, nonfocal. CN's intact grossly. No focal motor deficit. No sensory deficit.  Skin: no rash. Clean, dry, intact.   Psych: " cooperative    LABS / EKG        Labs  Today's labs reviewed. No issues.     Lab Results   Component Value Date     05/19/2022    K 4.2 05/19/2022     05/19/2022    BUN 15 05/19/2022    GLUCOSE 97 05/19/2022    CREATININE 0.5 (L) 05/19/2022    WBC 4.70 05/19/2022    HGB 14.5 05/19/2022    HCT 42.4 05/19/2022     05/19/2022    INR 1.0 05/19/2022         ECG   Reviewed. 5/19/2022 -- SB    ASSESSMENT AND PLAN         Abnormal ECG  Bradycardic without symptoms  Seen by [facility] in cardiology for pre-op -- no further cardiac testing required pre-op    Glaucoma  Can cont eye drops perioperatively    Primary osteoarthritis of right hip  R ANGELA planned for 5/24/2022  See below for statement in regards to perioperative risk  The patient was instructed to stop taking any NSAIDS per the surgeon's discretion  Recommend DVT prophylaxis at the discretion of the surgeon  Incentive spirometry and early ambulation post op  Post op bowel regimen  If present, recommend removal of Ross catheter as early as possible to prevent infection  Monitor CBC, BMP, and volume status in the perioperative period    History of pituitary tumor  S/p resection around 2018 or so  Follows w/ endocrine for monthly octreotide infusion       In regards to perioperative cardiac risk:  The patient denies any history of ischemic heart disease, denies any history of CHF, denies any history of CVA, is not on pre-operative treatment with insulin, and does not have a pre-operative creatinine > 2 mg/dL.   The Revised Cardiac Risk Index (RCRI) = 0 for this patient which indicates a 0.4% risk of major adverse cardiac event in the perioperative period for this intermediate risk procedure.     KIM screening:  STOP-BANG screening for obstructive sleep apnea = 2, which indicates the patient is low risk for having underlying KIM.     Additional comments:  - pt may take his eye drops on AM of surgery       Please do not hesitate to contact [facility] during the  upcoming hospitalization with any questions or concerns.     Sabi Forrester MD  5/19/2022

## 2022-05-19 NOTE — H&P (VIEW-ONLY)
[facility] Medicine Service -  Pre-Operative Consultation       Patient Name: Zack Ramirez  Referring Surgeon: Dr. Oscar Cantor    Reason for Referral: Pre-Operative Evaluation  Surgical Procedure: right total hip arthroplasty  Operative Date: 5/24/2022    PCP: Jose England MD        HISTORY OF PRESENT ILLNESS      Zack Ramirez is a 66 y.o. male presenting today to the [facility] at [place] for pre-operative evaluation prior to planned surgery.    Regarding the current issue:  Zack describes about a year of progressively worsening right hip pain.  He has pain with work and late in the day.    He also has trouble sleeping and is more comfortable in a recliner.    He takes ibuprofen for pain.      Regarding the pre-op eval:  Overall, the patient has been feeling in good health without issues of chest pain/pressure, dyspnea, or recent hospitalizations/significant infections.     Functionally, the patient is able to ascend a flight of stairs with no dyspnea or chest pain.   Functional capacity is good,  > 4 METS.    The patient denies, on specific questioning, the following:  No history of MI, arrhythmia, valvular heart disease or CHF.  No history of KIM.  No history of DVT/PE.  No history of COPD.  No history of CVA or TIA.  No history of DM or insulin use.   No history of CKD.     No history of difficult airway/difficult intubation.  No history of adverse reaction to anesthesia in the past.    Additional comments in regards to the patient's additional medical history:    1) Hx of pituitary tumor s/p resection -- on monthly octreotide injections, managed by endocrinology      PAST MEDICAL AND SURGICAL HISTORY      Past Medical History:   Diagnosis Date   • Arthritis    • COVID-19 vaccine series declined    • Glaucoma    • Pituitary tumor        Past Surgical History:   Procedure Laterality Date   • BRAIN TUMOR EXCISION     • COLONOSCOPY     • FINGER SURGERY Left    •  "LIPOMA RESECTION Left     L shin       MEDICATIONS        Current Outpatient Medications:   •  b complex vitamins capsule, Take 1 capsule by mouth every morning., Disp: , Rfl:   •  brimonidine-timoloL (COMBIGAN) 0.2-0.5 % ophthalmic solution, Administer 1 drop into the left eye every morning., Disp: , Rfl:   •  multivitamin/iron/folic acid (CENTRUM COMPLETE ORAL), Take 1 tablet by mouth every morning., Disp: , Rfl:   •  latanoprost (XALATAN) 0.005 % ophthalmic solution, Administer 1 drop into both eyes nightly., Disp: , Rfl:   •  octreotide acetate (SANDOSTATIN INJ), Inject 20 mg as directed every 30 (thirty) days. Monthly injection.  Patient received last dose on 4/15/22., Disp: , Rfl:     ALLERGIES      Patient has no known allergies.    FAMILY HISTORY        No pertinent family history    SOCIAL HISTORY      Social History     Tobacco Use   • Smoking status: Never Smoker   • Smokeless tobacco: Never Used   Vaping Use   • Vaping Use: Never used   Substance Use Topics   • Alcohol use: Yes     Alcohol/week: 6.0 standard drinks     Types: 6 Cans of beer per week   • Drug use: Not Currently       REVIEW OF SYSTEMS      All other systems reviewed and negative except as noted in HPI    PHYSICAL EXAMINATION      Visit Vitals  /81   Pulse (!) 52   Temp 36.9 °C (98.4 °F) (Oral)   Resp 16   Ht 1.765 m (5' 9.5\")   Wt 79 kg (174 lb 1.6 oz)   SpO2 99%   BMI 25.34 kg/m²     Body mass index is 25.34 kg/m².    Physical Exam  General: well-appearing M, nontoxic appearing, no acute distress  HEENT: Moist membranes, PERRL, anicteric sclera   Neck: supple, no JVD  Cardiac: RRR, no murmur, no rub   Lungs: clear bilaterally, no wheezing/rales/rhonchi  Abdomen: soft, NT/ND, +BS, no rebound/guarding   Extremities: no edema, distal perfusion intact  MSK: restricted ROM of right hip on flexion  Neuro: AAOx3, nonfocal. CN's intact grossly. No focal motor deficit. No sensory deficit.  Skin: no rash. Clean, dry, intact.   Psych: " cooperative    LABS / EKG        Labs  Today's labs reviewed. No issues.     Lab Results   Component Value Date     05/19/2022    K 4.2 05/19/2022     05/19/2022    BUN 15 05/19/2022    GLUCOSE 97 05/19/2022    CREATININE 0.5 (L) 05/19/2022    WBC 4.70 05/19/2022    HGB 14.5 05/19/2022    HCT 42.4 05/19/2022     05/19/2022    INR 1.0 05/19/2022         ECG   Reviewed. 5/19/2022 -- SB    ASSESSMENT AND PLAN         Abnormal ECG  Bradycardic without symptoms  Seen by [facility] in cardiology for pre-op -- no further cardiac testing required pre-op    Glaucoma  Can cont eye drops perioperatively    Primary osteoarthritis of right hip  R ANGELA planned for 5/24/2022  See below for statement in regards to perioperative risk  The patient was instructed to stop taking any NSAIDS per the surgeon's discretion  Recommend DVT prophylaxis at the discretion of the surgeon  Incentive spirometry and early ambulation post op  Post op bowel regimen  If present, recommend removal of Ross catheter as early as possible to prevent infection  Monitor CBC, BMP, and volume status in the perioperative period    History of pituitary tumor  S/p resection around 2018 or so  Follows w/ endocrine for monthly octreotide infusion       In regards to perioperative cardiac risk:  The patient denies any history of ischemic heart disease, denies any history of CHF, denies any history of CVA, is not on pre-operative treatment with insulin, and does not have a pre-operative creatinine > 2 mg/dL.   The Revised Cardiac Risk Index (RCRI) = 0 for this patient which indicates a 0.4% risk of major adverse cardiac event in the perioperative period for this intermediate risk procedure.     KIM screening:  STOP-BANG screening for obstructive sleep apnea = 2, which indicates the patient is low risk for having underlying KIM.     Additional comments:  - pt may take his eye drops on AM of surgery       Please do not hesitate to contact [facility] [place] during the  upcoming hospitalization with any questions or concerns.     Sabi Forrester MD  5/19/2022

## 2022-05-19 NOTE — ASSESSMENT & PLAN NOTE
Non-specific ECG finding  In the absence of symptoms and given this is a non-specific finding, no further workup indicated at this time

## 2022-05-19 NOTE — ASSESSMENT & PLAN NOTE
on February 2021 check  NLA handout on nonpharmacologic lipid-lowering given to patient and discussed  10-year ASCVD risk of 10.9% calculated  He does not wish to start statin at this point  Extensive discussion of coronary calcium score.  I showed him pictures of coronary calcifications versus someone without we discussed the utility of this risk stratifying tool    He lives [place] hour away and prefers to have the test done at [facility] if possible.  I wrote it down for him on the NLA paper and he will discuss with his primary doctor

## 2022-05-23 ENCOUNTER — ANESTHESIA EVENT (OUTPATIENT)
Dept: OPERATING ROOM | Facility: HOSPITAL | Age: 67
Setting detail: HOSPITAL OUTPATIENT SURGERY
End: 2022-05-23
Payer: COMMERCIAL

## 2022-05-23 NOTE — ANESTHESIA PREPROCEDURE EVALUATION
Relevant Problems   MUSCULOSKELETAL   (+) Primary osteoarthritis of right hip      NEUROLOGY   (+) History of pituitary tumor   ROS/Med Hx   Past Medical History:   Diagnosis Date   • Arthritis    • COVID-19 vaccine series declined    • Glaucoma    • Pituitary tumor      Past Surgical History:   Procedure Laterality Date   • BRAIN TUMOR EXCISION     • COLONOSCOPY     • FINGER SURGERY Left    • LIPOMA RESECTION Left     L shin     Patient Active Problem List   Diagnosis   • Encounter for pre-operative cardiovascular clearance   • Abnormal ECG   • Hyperlipidemia   • Glaucoma   • Primary osteoarthritis of right hip   • History of pituitary tumor     Prior to Admission medications    Medication Sig Start Date End Date Taking? Authorizing Provider   b complex vitamins capsule Take 1 capsule by mouth every morning.    Shay Gregory MD   brimonidine-timoloL (COMBIGAN) 0.2-0.5 % ophthalmic solution Administer 1 drop into the left eye every morning.    Shay Gregory MD   latanoprost (XALATAN) 0.005 % ophthalmic solution Administer 1 drop into both eyes nightly.    Shay Gregory MD   multivitamin/iron/folic acid (CENTRUM COMPLETE ORAL) Take 1 tablet by mouth every morning.    Shay Gregory MD   octreotide acetate (SANDOSTATIN INJ) Inject 20 mg as directed every 30 (thirty) days. Monthly injection.  Patient received last dose on 4/15/22.    Shay Gregory MD     CBC Results       05/19/22     1115    WBC 4.70    RBC 4.47    HGB 14.5    HCT 42.4    MCV 94.9    MCH 32.4    MCHC 34.2         BMP Results       05/19/22     1115        K 4.2    Cl 105    CO2 27    Glucose 97    BUN 15    Creatinine 0.5    Calcium 9.9    Anion Gap 6     Results from last 7 days   Lab Units 05/19/22  1115   INR  1.0   PTT sec 30     Physical Exam    Airway   Mallampati: II   TM distance: >3 FB   Neck ROM: full  Cardiovascular    Rhythm: regular   Rate: normalPulmonary - normal   clear to  auscultation  Dental - normal        Anesthesia Plan    Plan: spinal    Technique: spinal and MAC     Lines and Monitors: PIV     Airway: natural airway / supplemental oxygen   ASA 2  Blood Products:     Use of Blood Products Discussed: Yes     Consented to blood products  Anesthetic plan and risks discussed with: patient  Postop Plan:   Patient Disposition: inpatient floor planned admission   Pain Management: IV analgesics and spinal

## 2022-05-24 ENCOUNTER — HOSPITAL ENCOUNTER (OUTPATIENT)
Facility: HOSPITAL | Age: 67
Discharge: HOME | End: 2022-05-25
Attending: ORTHOPAEDIC SURGERY | Admitting: ORTHOPAEDIC SURGERY
Payer: COMMERCIAL

## 2022-05-24 ENCOUNTER — APPOINTMENT (OUTPATIENT)
Dept: RADIOLOGY | Facility: HOSPITAL | Age: 67
End: 2022-05-24
Attending: NURSE PRACTITIONER
Payer: COMMERCIAL

## 2022-05-24 ENCOUNTER — ANESTHESIA (OUTPATIENT)
Dept: OPERATING ROOM | Facility: HOSPITAL | Age: 67
Setting detail: HOSPITAL OUTPATIENT SURGERY
End: 2022-05-24
Payer: COMMERCIAL

## 2022-05-24 PROBLEM — R00.1 BRADYCARDIA: Status: ACTIVE | Noted: 2022-05-24

## 2022-05-24 LAB
ABO + RH BLD: NORMAL
BLD GP AB SCN SERPL QL: NEGATIVE
D AG BLD QL: POSITIVE
LABORATORY COMMENT REPORT: NORMAL
SPECIMEN EXP DATE BLD: NORMAL

## 2022-05-24 PROCEDURE — 25800000 HC PHARMACY IV SOLUTIONS: Performed by: NURSE PRACTITIONER

## 2022-05-24 PROCEDURE — 63600000 HC DRUGS/DETAIL CODE: Performed by: ORTHOPAEDIC SURGERY

## 2022-05-24 PROCEDURE — 25000000 HC PHARMACY GENERAL: Performed by: ORTHOPAEDIC SURGERY

## 2022-05-24 PROCEDURE — 97162 PT EVAL MOD COMPLEX 30 MIN: CPT | Mod: GP | Performed by: PHYSICAL THERAPIST

## 2022-05-24 PROCEDURE — 25000000 HC PHARMACY GENERAL: Performed by: ANESTHESIOLOGY

## 2022-05-24 PROCEDURE — 25000000 HC PHARMACY GENERAL: Performed by: NURSE ANESTHETIST, CERTIFIED REGISTERED

## 2022-05-24 PROCEDURE — 86850 RBC ANTIBODY SCREEN: CPT

## 2022-05-24 PROCEDURE — 63700000 HC SELF-ADMINISTRABLE DRUG: Performed by: NURSE PRACTITIONER

## 2022-05-24 PROCEDURE — 25000000 HC PHARMACY GENERAL: Performed by: NURSE PRACTITIONER

## 2022-05-24 PROCEDURE — 0SR902A REPLACEMENT OF RIGHT HIP JOINT WITH METAL ON POLYETHYLENE SYNTHETIC SUBSTITUTE, UNCEMENTED, OPEN APPROACH: ICD-10-PCS | Performed by: ORTHOPAEDIC SURGERY

## 2022-05-24 PROCEDURE — 73501 X-RAY EXAM HIP UNI 1 VIEW: CPT | Mod: RT

## 2022-05-24 PROCEDURE — 36000005 HC OR LEVEL 5 INITIAL 30MIN: Performed by: ORTHOPAEDIC SURGERY

## 2022-05-24 PROCEDURE — 27200000 HC STERILE SUPPLY: Performed by: ORTHOPAEDIC SURGERY

## 2022-05-24 PROCEDURE — 63600000 HC DRUGS/DETAIL CODE: Mod: JW | Performed by: NURSE ANESTHETIST, CERTIFIED REGISTERED

## 2022-05-24 PROCEDURE — 63600000 HC DRUGS/DETAIL CODE: Performed by: NURSE PRACTITIONER

## 2022-05-24 PROCEDURE — 99213 OFFICE O/P EST LOW 20 MIN: CPT | Performed by: INTERNAL MEDICINE

## 2022-05-24 PROCEDURE — C1776 JOINT DEVICE (IMPLANTABLE): HCPCS | Performed by: ORTHOPAEDIC SURGERY

## 2022-05-24 PROCEDURE — 63600000 HC DRUGS/DETAIL CODE: Performed by: ANESTHESIOLOGY

## 2022-05-24 PROCEDURE — 37000010 HC ANESTHESIA SPINAL: Performed by: ORTHOPAEDIC SURGERY

## 2022-05-24 PROCEDURE — 71000001 HC PACU PHASE 1 INITIAL 30MIN: Performed by: ORTHOPAEDIC SURGERY

## 2022-05-24 PROCEDURE — 36415 COLL VENOUS BLD VENIPUNCTURE: CPT | Performed by: ORTHOPAEDIC SURGERY

## 2022-05-24 PROCEDURE — 63600000 HC DRUGS/DETAIL CODE: Performed by: NURSE ANESTHETIST, CERTIFIED REGISTERED

## 2022-05-24 PROCEDURE — 71000011 HC PACU PHASE 1 EA ADDL MIN: Performed by: ORTHOPAEDIC SURGERY

## 2022-05-24 PROCEDURE — 36000015 HC OR LEVEL 5 EA ADDL MIN: Performed by: ORTHOPAEDIC SURGERY

## 2022-05-24 DEVICE — IMPLANTABLE DEVICE: Type: IMPLANTABLE DEVICE | Site: HIP | Status: FUNCTIONAL

## 2022-05-24 DEVICE — FEMORAL HEAD SZ 28/MED 0 BENCOX: Type: IMPLANTABLE DEVICE | Site: HIP | Status: FUNCTIONAL

## 2022-05-24 DEVICE — ACETAB LINER 46MM/SZ G G7 DUAL: Type: IMPLANTABLE DEVICE | Site: HIP | Status: FUNCTIONAL

## 2022-05-24 DEVICE — BEARING DUAL MOBILITY 28X46MM VIVACIT-E: Type: IMPLANTABLE DEVICE | Site: HIP | Status: FUNCTIONAL

## 2022-05-24 DEVICE — HIP STEM SZ 6 BENCOX M LATERAL: Type: IMPLANTABLE DEVICE | Site: HIP | Status: FUNCTIONAL

## 2022-05-24 RX ORDER — BRIMONIDINE TARTRATE 2 MG/ML
1 SOLUTION/ DROPS OPHTHALMIC 2 TIMES DAILY
Status: DISCONTINUED | OUTPATIENT
Start: 2022-05-25 | End: 2022-05-25 | Stop reason: HOSPADM

## 2022-05-24 RX ORDER — HEPARIN SODIUM 1000 [USP'U]/ML
INJECTION, SOLUTION INTRAVENOUS; SUBCUTANEOUS AS NEEDED
Status: DISCONTINUED | OUTPATIENT
Start: 2022-05-24 | End: 2022-05-24 | Stop reason: SURG

## 2022-05-24 RX ORDER — BISACODYL 10 MG/1
10 SUPPOSITORY RECTAL DAILY PRN
Status: DISCONTINUED | OUTPATIENT
Start: 2022-05-24 | End: 2022-05-25 | Stop reason: HOSPADM

## 2022-05-24 RX ORDER — OXYCODONE HYDROCHLORIDE 5 MG/1
5-10 TABLET ORAL EVERY 4 HOURS PRN
Status: DISCONTINUED | OUTPATIENT
Start: 2022-05-24 | End: 2022-05-25 | Stop reason: HOSPADM

## 2022-05-24 RX ORDER — CEFAZOLIN SODIUM 1 G/50ML
1 SOLUTION INTRAVENOUS
Status: COMPLETED | OUTPATIENT
Start: 2022-05-24 | End: 2022-05-25

## 2022-05-24 RX ORDER — LIDOCAINE HYDROCHLORIDE 20 MG/ML
INJECTION, SOLUTION EPIDURAL; INFILTRATION; INTRACAUDAL; PERINEURAL AS NEEDED
Status: DISCONTINUED | OUTPATIENT
Start: 2022-05-24 | End: 2022-05-24 | Stop reason: SURG

## 2022-05-24 RX ORDER — DEXAMETHASONE SODIUM PHOSPHATE 4 MG/ML
10 INJECTION, SOLUTION INTRA-ARTICULAR; INTRALESIONAL; INTRAMUSCULAR; INTRAVENOUS; SOFT TISSUE ONCE
Status: COMPLETED | OUTPATIENT
Start: 2022-05-25 | End: 2022-05-25

## 2022-05-24 RX ORDER — ONDANSETRON 4 MG/1
4 TABLET, ORALLY DISINTEGRATING ORAL EVERY 8 HOURS PRN
Status: DISCONTINUED | OUTPATIENT
Start: 2022-05-24 | End: 2022-05-25 | Stop reason: HOSPADM

## 2022-05-24 RX ORDER — BUPIVACAINE HYDROCHLORIDE 7.5 MG/ML
INJECTION, SOLUTION INTRASPINAL
Status: COMPLETED | OUTPATIENT
Start: 2022-05-24 | End: 2022-05-24

## 2022-05-24 RX ORDER — PANTOPRAZOLE SODIUM 40 MG/1
40 TABLET, DELAYED RELEASE ORAL
Status: DISCONTINUED | OUTPATIENT
Start: 2022-05-25 | End: 2022-05-25 | Stop reason: HOSPADM

## 2022-05-24 RX ORDER — DEXAMETHASONE SODIUM PHOSPHATE 4 MG/ML
INJECTION, SOLUTION INTRA-ARTICULAR; INTRALESIONAL; INTRAMUSCULAR; INTRAVENOUS; SOFT TISSUE AS NEEDED
Status: DISCONTINUED | OUTPATIENT
Start: 2022-05-24 | End: 2022-05-24 | Stop reason: SURG

## 2022-05-24 RX ORDER — DIPHENHYDRAMINE HCL 25 MG
25 CAPSULE ORAL EVERY 6 HOURS PRN
Status: DISCONTINUED | OUTPATIENT
Start: 2022-05-24 | End: 2022-05-25 | Stop reason: HOSPADM

## 2022-05-24 RX ORDER — MIDAZOLAM HYDROCHLORIDE 2 MG/2ML
INJECTION, SOLUTION INTRAMUSCULAR; INTRAVENOUS AS NEEDED
Status: DISCONTINUED | OUTPATIENT
Start: 2022-05-24 | End: 2022-05-24 | Stop reason: SURG

## 2022-05-24 RX ORDER — SODIUM CHLORIDE 0.9 G/100ML
INJECTION, SOLUTION IRRIGATION
Status: DISCONTINUED | OUTPATIENT
Start: 2022-05-24 | End: 2022-05-24 | Stop reason: HOSPADM

## 2022-05-24 RX ORDER — ONDANSETRON HYDROCHLORIDE 2 MG/ML
INJECTION, SOLUTION INTRAVENOUS AS NEEDED
Status: DISCONTINUED | OUTPATIENT
Start: 2022-05-24 | End: 2022-05-24 | Stop reason: SURG

## 2022-05-24 RX ORDER — DEXTROSE 50 % IN WATER (D50W) INTRAVENOUS SYRINGE
25 AS NEEDED
Status: DISCONTINUED | OUTPATIENT
Start: 2022-05-24 | End: 2022-05-24 | Stop reason: HOSPADM

## 2022-05-24 RX ORDER — TRANEXAMIC ACID 10 MG/ML
1000 INJECTION, SOLUTION INTRAVENOUS ONCE
Status: COMPLETED | OUTPATIENT
Start: 2022-05-24 | End: 2022-05-24

## 2022-05-24 RX ORDER — FENTANYL CITRATE 50 UG/ML
INJECTION, SOLUTION INTRAMUSCULAR; INTRAVENOUS AS NEEDED
Status: DISCONTINUED | OUTPATIENT
Start: 2022-05-24 | End: 2022-05-24 | Stop reason: SURG

## 2022-05-24 RX ORDER — TIMOLOL MALEATE 5 MG/ML
1 SOLUTION/ DROPS OPHTHALMIC 2 TIMES DAILY
Status: DISCONTINUED | OUTPATIENT
Start: 2022-05-24 | End: 2022-05-25 | Stop reason: HOSPADM

## 2022-05-24 RX ORDER — TIZANIDINE 4 MG/1
4 TABLET ORAL EVERY 6 HOURS PRN
Status: DISCONTINUED | OUTPATIENT
Start: 2022-05-24 | End: 2022-05-25 | Stop reason: HOSPADM

## 2022-05-24 RX ORDER — POVIDONE-IODINE 5 %
SOLUTION, NON-ORAL OPHTHALMIC (EYE)
Status: DISCONTINUED | OUTPATIENT
Start: 2022-05-24 | End: 2022-05-24 | Stop reason: HOSPADM

## 2022-05-24 RX ORDER — ROPIVACAINE HYDROCHLORIDE 5 MG/ML
INJECTION, SOLUTION EPIDURAL; INFILTRATION; PERINEURAL
Status: DISCONTINUED | OUTPATIENT
Start: 2022-05-24 | End: 2022-05-24 | Stop reason: HOSPADM

## 2022-05-24 RX ORDER — ACETAMINOPHEN 325 MG/1
975 TABLET ORAL EVERY 8 HOURS
Status: DISCONTINUED | OUTPATIENT
Start: 2022-05-24 | End: 2022-05-25 | Stop reason: HOSPADM

## 2022-05-24 RX ORDER — CELECOXIB 200 MG/1
200 CAPSULE ORAL ONCE
Status: COMPLETED | OUTPATIENT
Start: 2022-05-24 | End: 2022-05-24

## 2022-05-24 RX ORDER — ASPIRIN 81 MG/1
81 TABLET ORAL 2 TIMES DAILY
Status: DISCONTINUED | OUTPATIENT
Start: 2022-05-24 | End: 2022-05-25 | Stop reason: HOSPADM

## 2022-05-24 RX ORDER — ONDANSETRON HYDROCHLORIDE 2 MG/ML
4 INJECTION, SOLUTION INTRAVENOUS EVERY 8 HOURS PRN
Status: DISCONTINUED | OUTPATIENT
Start: 2022-05-24 | End: 2022-05-25 | Stop reason: HOSPADM

## 2022-05-24 RX ORDER — SODIUM CHLORIDE, SODIUM LACTATE, POTASSIUM CHLORIDE, CALCIUM CHLORIDE 600; 310; 30; 20 MG/100ML; MG/100ML; MG/100ML; MG/100ML
INJECTION, SOLUTION INTRAVENOUS CONTINUOUS
Status: ACTIVE | OUTPATIENT
Start: 2022-05-24 | End: 2022-05-25

## 2022-05-24 RX ORDER — DEXTROSE 40 %
15-30 GEL (GRAM) ORAL AS NEEDED
Status: DISCONTINUED | OUTPATIENT
Start: 2022-05-24 | End: 2022-05-24 | Stop reason: HOSPADM

## 2022-05-24 RX ORDER — EPHEDRINE SULFATE 50 MG/ML
INJECTION, SOLUTION INTRAVENOUS AS NEEDED
Status: DISCONTINUED | OUTPATIENT
Start: 2022-05-24 | End: 2022-05-24 | Stop reason: SURG

## 2022-05-24 RX ORDER — SODIUM CHLORIDE 9 MG/ML
INJECTION, SOLUTION INTRAVENOUS CONTINUOUS
Status: ACTIVE | OUTPATIENT
Start: 2022-05-24 | End: 2022-05-24

## 2022-05-24 RX ORDER — AMOXICILLIN 250 MG
1 CAPSULE ORAL 2 TIMES DAILY
Status: DISCONTINUED | OUTPATIENT
Start: 2022-05-24 | End: 2022-05-25 | Stop reason: HOSPADM

## 2022-05-24 RX ORDER — SODIUM CHLORIDE, SODIUM LACTATE, POTASSIUM CHLORIDE, CALCIUM CHLORIDE 600; 310; 30; 20 MG/100ML; MG/100ML; MG/100ML; MG/100ML
INJECTION, SOLUTION INTRAVENOUS CONTINUOUS
Status: ACTIVE | OUTPATIENT
Start: 2022-05-24 | End: 2022-05-24

## 2022-05-24 RX ORDER — FENTANYL CITRATE 50 UG/ML
50 INJECTION, SOLUTION INTRAMUSCULAR; INTRAVENOUS
Status: DISCONTINUED | OUTPATIENT
Start: 2022-05-24 | End: 2022-05-24 | Stop reason: HOSPADM

## 2022-05-24 RX ORDER — PROPOFOL 10 MG/ML
INJECTION, EMULSION INTRAVENOUS CONTINUOUS PRN
Status: DISCONTINUED | OUTPATIENT
Start: 2022-05-24 | End: 2022-05-24 | Stop reason: SURG

## 2022-05-24 RX ORDER — IBUPROFEN 200 MG
16-32 TABLET ORAL AS NEEDED
Status: DISCONTINUED | OUTPATIENT
Start: 2022-05-24 | End: 2022-05-24 | Stop reason: HOSPADM

## 2022-05-24 RX ORDER — KETOROLAC TROMETHAMINE 15 MG/ML
15 INJECTION, SOLUTION INTRAMUSCULAR; INTRAVENOUS EVERY 6 HOURS
Status: DISCONTINUED | OUTPATIENT
Start: 2022-05-24 | End: 2022-05-25 | Stop reason: HOSPADM

## 2022-05-24 RX ORDER — MORPHINE SULFATE 2 MG/ML
2 INJECTION, SOLUTION INTRAMUSCULAR; INTRAVENOUS EVERY 4 HOURS PRN
Status: DISCONTINUED | OUTPATIENT
Start: 2022-05-24 | End: 2022-05-25 | Stop reason: HOSPADM

## 2022-05-24 RX ORDER — DIPHENHYDRAMINE HCL 50 MG/ML
25 VIAL (ML) INJECTION EVERY 6 HOURS PRN
Status: DISCONTINUED | OUTPATIENT
Start: 2022-05-24 | End: 2022-05-25 | Stop reason: HOSPADM

## 2022-05-24 RX ORDER — POLYETHYLENE GLYCOL 3350 17 G/17G
17 POWDER, FOR SOLUTION ORAL DAILY
Status: DISCONTINUED | OUTPATIENT
Start: 2022-05-24 | End: 2022-05-25 | Stop reason: HOSPADM

## 2022-05-24 RX ORDER — CEFAZOLIN SODIUM/WATER 2 G/20 ML
2 SYRINGE (ML) INTRAVENOUS
Status: COMPLETED | OUTPATIENT
Start: 2022-05-24 | End: 2022-05-24

## 2022-05-24 RX ORDER — HYDROMORPHONE HYDROCHLORIDE 1 MG/ML
0.5 INJECTION, SOLUTION INTRAMUSCULAR; INTRAVENOUS; SUBCUTANEOUS
Status: DISCONTINUED | OUTPATIENT
Start: 2022-05-24 | End: 2022-05-24 | Stop reason: HOSPADM

## 2022-05-24 RX ORDER — VANCOMYCIN HYDROCHLORIDE 1 G/20ML
INJECTION, POWDER, LYOPHILIZED, FOR SOLUTION INTRAVENOUS
Status: DISCONTINUED | OUTPATIENT
Start: 2022-05-24 | End: 2022-05-24 | Stop reason: HOSPADM

## 2022-05-24 RX ORDER — ONDANSETRON HYDROCHLORIDE 2 MG/ML
4 INJECTION, SOLUTION INTRAVENOUS
Status: DISCONTINUED | OUTPATIENT
Start: 2022-05-24 | End: 2022-05-24 | Stop reason: HOSPADM

## 2022-05-24 RX ORDER — ACETAMINOPHEN 325 MG/1
975 TABLET ORAL ONCE
Status: COMPLETED | OUTPATIENT
Start: 2022-05-24 | End: 2022-05-24

## 2022-05-24 RX ORDER — ALUMINUM HYDROXIDE, MAGNESIUM HYDROXIDE, AND SIMETHICONE 1200; 120; 1200 MG/30ML; MG/30ML; MG/30ML
30 SUSPENSION ORAL EVERY 4 HOURS PRN
Status: DISCONTINUED | OUTPATIENT
Start: 2022-05-24 | End: 2022-05-25 | Stop reason: HOSPADM

## 2022-05-24 RX ADMIN — SODIUM CHLORIDE: 9 INJECTION, SOLUTION INTRAVENOUS at 20:27

## 2022-05-24 RX ADMIN — ONDANSETRON HYDROCHLORIDE 4 MG: 2 INJECTION, SOLUTION INTRAMUSCULAR; INTRAVENOUS at 07:22

## 2022-05-24 RX ADMIN — SODIUM CHLORIDE, POTASSIUM CHLORIDE, SODIUM LACTATE AND CALCIUM CHLORIDE: 600; 310; 30; 20 INJECTION, SOLUTION INTRAVENOUS at 10:48

## 2022-05-24 RX ADMIN — DEXAMETHASONE SODIUM PHOSPHATE 10 MG: 4 INJECTION, SOLUTION INTRA-ARTICULAR; INTRALESIONAL; INTRAMUSCULAR; INTRAVENOUS; SOFT TISSUE at 07:19

## 2022-05-24 RX ADMIN — SODIUM CHLORIDE: 9 INJECTION, SOLUTION INTRAVENOUS at 12:42

## 2022-05-24 RX ADMIN — SODIUM CHLORIDE, POTASSIUM CHLORIDE, SODIUM LACTATE AND CALCIUM CHLORIDE: 600; 310; 30; 20 INJECTION, SOLUTION INTRAVENOUS at 05:51

## 2022-05-24 RX ADMIN — FENTANYL CITRATE 50 MCG: 50 INJECTION, SOLUTION INTRAMUSCULAR; INTRAVENOUS at 06:57

## 2022-05-24 RX ADMIN — FENTANYL CITRATE 50 MCG: 50 INJECTION, SOLUTION INTRAMUSCULAR; INTRAVENOUS at 08:58

## 2022-05-24 RX ADMIN — MIDAZOLAM HYDROCHLORIDE 2 MG: 1 INJECTION, SOLUTION INTRAMUSCULAR; INTRAVENOUS at 06:57

## 2022-05-24 RX ADMIN — PROPOFOL INJECTABLE EMULSION 20 MG: 10 INJECTION, EMULSION INTRAVENOUS at 07:36

## 2022-05-24 RX ADMIN — LIDOCAINE HYDROCHLORIDE 5 ML: 20 INJECTION, SOLUTION EPIDURAL; INFILTRATION; INTRACAUDAL at 07:13

## 2022-05-24 RX ADMIN — TRANEXAMIC ACID 1000 MG: 10 INJECTION, SOLUTION INTRAVENOUS at 05:51

## 2022-05-24 RX ADMIN — BUPIVACAINE HYDROCHLORIDE IN DEXTROSE 1.8 ML: 7.5 INJECTION, SOLUTION SUBARACHNOID at 07:01

## 2022-05-24 RX ADMIN — FENTANYL CITRATE 50 MCG: 50 INJECTION, SOLUTION INTRAMUSCULAR; INTRAVENOUS at 07:29

## 2022-05-24 RX ADMIN — MIDAZOLAM HYDROCHLORIDE 1 MG: 1 INJECTION, SOLUTION INTRAMUSCULAR; INTRAVENOUS at 07:13

## 2022-05-24 RX ADMIN — CEFAZOLIN 2 G: 330 INJECTION, POWDER, FOR SOLUTION INTRAMUSCULAR; INTRAVENOUS at 07:12

## 2022-05-24 RX ADMIN — EPHEDRINE SULFATE 10 MG: 50 INJECTION, SOLUTION INTRAVENOUS at 08:19

## 2022-05-24 RX ADMIN — CEFAZOLIN SODIUM 1 G: 1 SOLUTION INTRAVENOUS at 22:40

## 2022-05-24 RX ADMIN — TIZANIDINE 4 MG: 4 TABLET ORAL at 14:59

## 2022-05-24 RX ADMIN — CEFAZOLIN SODIUM 1 G: 1 SOLUTION INTRAVENOUS at 15:02

## 2022-05-24 RX ADMIN — ACETAMINOPHEN 325MG 975 MG: 325 TABLET ORAL at 12:39

## 2022-05-24 RX ADMIN — KETOROLAC TROMETHAMINE 15 MG: 15 INJECTION, SOLUTION INTRAMUSCULAR; INTRAVENOUS at 12:39

## 2022-05-24 RX ADMIN — ACETAMINOPHEN 325MG 975 MG: 325 TABLET ORAL at 05:00

## 2022-05-24 RX ADMIN — PROPOFOL INJECTABLE EMULSION 50 MCG/KG/MIN: 10 INJECTION, EMULSION INTRAVENOUS at 07:14

## 2022-05-24 RX ADMIN — ACETAMINOPHEN 325MG 975 MG: 325 TABLET ORAL at 22:39

## 2022-05-24 RX ADMIN — MIDAZOLAM HYDROCHLORIDE 1 MG: 1 INJECTION, SOLUTION INTRAMUSCULAR; INTRAVENOUS at 07:00

## 2022-05-24 RX ADMIN — SODIUM CHLORIDE, POTASSIUM CHLORIDE, SODIUM LACTATE AND CALCIUM CHLORIDE: 600; 310; 30; 20 INJECTION, SOLUTION INTRAVENOUS at 07:34

## 2022-05-24 RX ADMIN — CELECOXIB 200 MG: 200 CAPSULE ORAL at 05:00

## 2022-05-24 RX ADMIN — TRANEXAMIC ACID 1000 MG: 10 INJECTION, SOLUTION INTRAVENOUS at 09:01

## 2022-05-24 RX ADMIN — HEPARIN SODIUM 1000 UNITS: 1000 INJECTION, SOLUTION INTRAVENOUS; SUBCUTANEOUS at 07:19

## 2022-05-24 RX ADMIN — THERA TABS 1 TABLET: TAB at 12:39

## 2022-05-24 RX ADMIN — TIMOLOL MALEATE 1 DROP: 5 SOLUTION/ DROPS OPHTHALMIC at 20:28

## 2022-05-24 RX ADMIN — ASPIRIN 81 MG: 81 TABLET, COATED ORAL at 17:58

## 2022-05-24 RX ADMIN — KETOROLAC TROMETHAMINE 15 MG: 15 INJECTION, SOLUTION INTRAMUSCULAR; INTRAVENOUS at 17:58

## 2022-05-24 ASSESSMENT — COGNITIVE AND FUNCTIONAL STATUS - GENERAL
MOVING TO AND FROM BED TO CHAIR: 3 - A LITTLE
STANDING UP FROM CHAIR USING ARMS: 3 - A LITTLE
WALKING IN HOSPITAL ROOM: 3 - A LITTLE
CLIMB 3 TO 5 STEPS WITH RAILING: 3 - A LITTLE
AFFECT: FLAT/BLUNTED AFFECT

## 2022-05-24 ASSESSMENT — PATIENT HEALTH QUESTIONNAIRE - PHQ9: SUM OF ALL RESPONSES TO PHQ9 QUESTIONS 1 & 2: 0

## 2022-05-24 NOTE — PROGRESS NOTES
Pt seen & examined in PACU  No complaints, pain well controlled  Denies Cp, SOB, palpitations, nausea & vomiting    Alert, VSS, NAD  RLE aquacell dressing c/d/i with ice pack in place  BLE DP/PT intact, unable to obtain motor/sensory exam secondary to spinal anesthesia  Calves soft, nontender    A/P: s/p right ANGELA POD 0 d/w Attending  Pain control  Post op xray reviewed by MD   DVT ppx ASA 81mg BID x 4 weeks  PT/OT  WBAT  Med management: [facility]  IVF/schaefer until POD1   Decadron 10mg IV x1 POD 1   dispo planing likely home tmw pending clearances  Motor check

## 2022-05-24 NOTE — PLAN OF CARE
Problem: Adult Inpatient Plan of Care  Goal: Plan of Care Review  Outcome: Progressing  Flowsheets (Taken 5/24/2022 1712)  Progress: improving  Plan of Care Reviewed With: patient  Outcome Summary: Pain well controlled. Orthostatic with PT, Ortho aware. Pt resting comfortably in the chair. Ross draining clear yellow urine.   Plan of Care Review  Plan of Care Reviewed With: patient  Progress: improving  Outcome Summary: Pain well controlled. Orthostatic with PT, Ortho aware. Pt resting comfortably in the chair. Ross draining clear yellow urine.

## 2022-05-24 NOTE — DISCHARGE INSTRUCTIONS
POST-OPERATIVE INSTRUCTIONS -HIP REPLACEMENT  Surgeon:  Dr. Oscar Cantor   Nurse:  Ce Lr    Phone: [phone]  [facility] Phone: [phone]   Use for emergencies (including after business hours and weekends)     Congratulations!  You've made a big step!  Our focus now is on your recovery and getting you back to a healthy, happy, pain-free lifestyle.  Every day, you will find you can do a bit more.  You are literally teaching yourself to walk normally again! Here are answers to some common questions:  1. ACTIVITY/REHABILITATION/PHYSICALTHERAPY (PT)  There is a great deal of healing that will take place during the next several months of your recovery as your bone literally grows into the new implant and your muscles heal.  Please do not overdo it!  Let your hip heal and it will likely give you a lifetime of pain relief. Your focus should simply be on walking and doing your normal activities of daily living (ADL).  You use all the muscles around your hip when you walk and when you're up doing things.  You do not need to set records, simply walk as much as you feel comfortable every day.  It is normal to have discomfort, but if you are having intense pain, stop the activity and rest.  You'll notice just about every day that you are feeling stronger and can walk further with less assistance. Don't be concerned about specific exercises, JUST WALK!!  The plan is for you to go home directly from the hospital (there may be an exception, but it is rare).  Most people will be able to go home in 1 or 2 days after surgery.  The timing of your discharge will be determined by how well you do with the hospital physical therapy.  We want to make sure that you are safe to go home.  If you can move around the hospital without assistance, it's unlikely that you'll need extra assistance when you get home.  Home care services; such as physical therapy and nursing are rarely required, even if you live alone.  Remember, we simply want you to be able to safely get around your home and WALK!  Inpatient rehabilitation is almost never required in this day and age. In the unusual situation that it is necessary, it will be arranged by the hospital .  Even at inpatient rehab, we just want you to focus on walking. The vast majority of patients will NOT even need any formal outpatient PT!  However, if you are having any problems at home, please call us and we can discuss the option of a more formal PT program. We've learned that patients “taking it easy” for the first couple of weeks after surgery may have better outcomes. Doing intense PT right after surgery can cause pain and swelling.  If you avoid overdoing it, you will have less pain and swelling.  Every day you will feel stronger and more stable. You will use a walker at first and you should plan to advance to a cane sometime within a couple weeks after surgery - you can do this whenever you feel comfortable. Using the cane helps your muscles heal faster, so use the cane for about a month. Stay active, but don't overdo it - we want the tissues and bone to heal well before we start stressing the hip.  You can resume upper body exercises or start using a stationary bike (but not necessary!) after 2 weeks.  Remember: NO FORMAL Physical Therapy…Just WALK!  2. SWELLING   It is NORMAL for your leg to swell after surgery.  If you're having a lot of swelling, you must spend more time elevating your leg well above your heart (see image below).  You may need to elevate your leg on 4 or 5 pillows for 30-60 minutes at a time, 5 times a day.  If your leg swelling is not improving, please let us know. Some swelling can persist for weeks to months after the procedure.    This is How to Elevate Your Legs above Heart Level:        To Reduce Swelling and pain  Do this 30-60 mins at a time!  Do this 4 to 5 times a day!    3. MEDICINES  Typically, you are given prescriptions at  discharge from the hospital. Let us know if you have any allergies to these medicines.  Your nurse will review your medicines upon your discharge.    Pain Medicine: Typically you will be given a prescription for oxycodone, hydrocodone, or tramadol.  Take this as necessary, but wean off of it to Tylenol as soon as you feel comfortable.  You should notice that you need less narcotic pain medicine as time passes. Do NOT drive while you are taking narcotic pain medicines.  Stop taking the pain medicine if you become dizzy or disoriented.  Constipation is a major side effect of narcotics and many patients will avoid narcotics, as they prefer to deal with a bit of pain rather than constipation.  If you stop taking the pain medicine, you can typically stop taking the stool softener. Gradually weaning off the narcotic pain medicines may be safer than abruptly stopping them.     Stool Softener: (usually Colace).  This helps to avoid constipation caused by the other medications. Take it 2 times per day for 2-4 weeks, or as long as you are taking the narcotics. It is fine to take over-the-counter laxatives in addition to Colace for constipation management.  If you are not constipated or you experience diarrhea, stop taking the stool softener!    Blood Thinners: Aspirin, Coumadin (warfarin), or Lovenox (enoxaparin) - ONLY ONE - you should not be on more than one of these medicines.  These medicines will help to reduce the risk of blood clots.  However, if your blood becomes too “thin”, you may see bleeding (at the surgical site, gums, in urine, rectum, etc.), severe bruising, or stomach upset.  Please call the office if this occurs.  Do not take vitamin K, vitamin E, or supplements until seen in the office.  It is fine to take a multivitamin. Please remember that the most important way to reduce the risk of clot formation is to get up and get going!        ASPIRIN/ECOTRIN (nearly all patients):   If you are discharged with  aspirin (either 81mg or 325mg), take it twice a day x weeks.       COUMADIN (Warfarin):  If you are taking Coumadin, you must get a blood test (INR) drawn 2x/week (you'll have a prescription for this).  Your dose will be adjusted based on the lab values.  Stay on Coumadin for 4 weeks.  If you were taking Coumadin before surgery you may resume your normal dose at this time.  Typically the cardiologist or your medical doctor will follow your labs and adjust the Coumadin dose.  If you do not receive instruction from them or cannot get in touch with them, call our office.      LOVENOX (Enoxaparin):    This is an injection you will give to yourself for 14 days after surgery.  NOTE: If you notice any excessive bruising or bleeding from your surgical wound (or elsewhere), call our office.    Other Blood Thinners:  If you are taking other types of blood thinners such as Xarelto, Eliquis, Plavix, Aggrenox, Arixtra, Heparin, etc. please confirm your specific dosing and when to resume these medicines prior to your discharge.  Please DO NOT leave the hospital without a clear understanding of the instructions regarding blood thinners!    If you are discharged on low dose 81 mg Aspirin, you may resume Non-Steroidal Anti-Inflammatory Drugs (NSAID's) such as ibuprofen (Motrin), naproxen (Aleve), etc. Wait at least 1 hour after aspirin dose before taking the NSAID. Please be careful when taking NSAID's and while taking aspirin because it can cause damage to your stomach or lead to bleeding. Do not take NSAID's if you are discharged on Coumadin, Lovenox, or other blood thinners. Call our office if you have questions.    4. SURGICAL DRESSING/INCISION  A special dressing was placed over your wound at the time of surgery.  This has been shown to reduce the risk of infection. This protects your wound and can remain on for up to 1 week.  Blood spotting is normal, but should be contained in the padding of the dressing. You can shower  without covering it. Remove the dressing 7 days after your surgery. It may be helpful to warm up the dressing by placing a warm towel over the dressing for 10 minutes prior to removal, or get in the shower and let warm water run over dressing.  This is done in an attempt to reduce the pull on your skin. Once removed, simply use soap and water to clean the incision daily and leave the incision open to air. Do not apply any lotions, creams, or ointments to your incision until fully closed and healed (4-6 weeks). Do not soak in a tub or swim until incision fully closed and healed (4-6 weeks). There usually are no staples or stitches that will need to be removed from your incision (if you have them, call for an appointment to remove at 2 weeks post op).  There are deep sutures under the skin that will dissolve over time.      5. SLEEPING   It is NORMAL to have difficulty sleeping after orthopaedic surgery.  It may take several months until you are sleeping normally. Elevating your leg throughout the day will reduce swelling that builds up at night - this can help you get a better night's rest. We generally advise against taking sleeping medication postoperatively, unless you were doing so prior to the surgery.    6. SLEEPING POSITION   There are no restrictions regarding position.  When lying on your side, you may place a pillow between your knees for comfort, if necessary.     7. DRIVING  You must have advanced to a cane, be off narcotics, and feel comfortable and safe to drive! If it was your left hip, you may be ready after 1 week.  For the right hip, it usually takes longer; you may be ready as early as 3 weeks. You should drive only if you feel safe! If in doubt, call us.     8. INFECTION PRECAUTIONS FOR DENTAL APPOINTMENTS  You will need to take an antibiotic approximately one hour before any dental appointments. Call our office for a prescription if you have a dental appointment scheduled. Current recommendations  are to continue to do this for the first 2 years after your surgery.    9. FLYING/TRIPS    By 6 weeks after surgery, you should be able to take just about any trip. Prior to that, it is likely that it will be uncomfortable, especially for longer trips, so we advise you to plan your travel accordingly.  For patients who come from far away, you can typically fly home within the first week. During any long trip (plane, train, auto), we suggest that you to take time to get up, stretch your legs, and walk around. Take a 325mg Aspirin on the morning and night of your trip (if not on other blood thinners). Special cards stating that you have a hip replacement are no longer provided, as the [facility] no longer accepts them.  Plan to spend some extra time at airport security in case you set off the alarm when you pass through the metal detector!    10. DISABILITY FORMS  Please contact our  if you have any disability, work, or other forms that need to be completed. Forms can be processed via the  at our [facility]. Form processing requires a signed release of information on file and form payment. Turnaround time for form completion is 7-10 days.    11. POST-OP APPOINTMENT: call [phone] if you do not have one scheduled.  Your appointment was made when you scheduled surgery. Routine post op visits are between 4- 6 weeks after your procedure.    12. RETURN TO WORK   I expect my patients to return to work within 3 months from surgery.  Most patients return around 4-6 weeks post op. You can return when you feel ready. You can return with restrictions (if your job allows you to do so).  Call my nurse Encinas if you need a note.         Dr Cantor Team Tips:    Call my Nurse Encinas if you have questions or concerns when you are home.  Please call at any time if you experience worsening pain, new numbness/tingling or weakness, wound redness or drainage, bleeding, loss of motion, fevers  (temp above 101 F), or chills.  Never hesitate to call if you have a concern.  [phone] for Non-Emergent needs: (Mon-Fri 8:30am to 4:00pm)  [phone] for Emergency needs during or after business hours     Pain medication refills- Call my nurse before you run out of medication (24 hours notice is preferred). Please keep track of how much medication you have left and call between 8:30 am and 3pm Monday through Friday. We do not prescribe medications after hours or on weekends.     We do not refill pain medication after 3 months from your surgery date. To prevent narcotic dependency, we recommend you start decreasing the amount of narcotic medication you are taking at 1 month from surgery (many patients do it sooner) and rely on just Tylenol or NSAID's for pain management.     It's normal to have an elevated temperature or low grade fever that may persist for 6 to 8 weeks after surgery. Call if 101 degrees or higher.    It's normal for the hip area to feel warm and can persist for up to 6 months.    It's normal to feel numbness around the incisional area (may resolve in 6 months to 1 year, but may be permanent). It's normal to feel a burning pain around the hip or near the incision (this will calm down as the swelling and inflammation lessens in your leg).    You might find it hard to raise or lift your leg on your own right after surgery. It might take up to 4 weeks before you regain full strength. The walking you do daily will help strengthen the leg.    It can be normal for some redness/pinkness to appear around the incision. Angry or bright redness should be a concern and call my nurse if this occurs.      If you develop blisters on your skin on the operative leg, please call my nurse Ce for further instructions and treatment to help prevent infection.    It is normal to have bruising up and down the operative leg and may take a long time to go away.    It's good to apply ice for about 20 minutes 3-4  times a day (not directly on your skin though) to help with pain and swelling.    It's normal to hear or feel clicking in the hip (which may go away as inflammation goes down).    If you have increased swelling, you are likely overdoing it with activity.    It might take a few weeks to “turn the corner” after surgery. Try to stay positive during this time. You will be able to return to your normal activities at a gradual pace in due time.

## 2022-05-24 NOTE — PROGRESS NOTES
Inpatient Cardiology   Daily Progress Note       Overview:  - s/p R THR 5/24/22     Assessment/Plan   Bradycardia  Assessment & Plan  - PAT EKG HR 49, HR in PACU 45-60  - BP stable    - No treatment needed for asymptomatic tati     History of pituitary tumor  Assessment & Plan  - Hx of pituitary tumor s/p resection -- on monthly octreotide injections, managed by his OP endocrinology    Hyperlipidemia  Assessment & Plan  -  on February 2021. Did not wish to start statin as OP     - Plans to discuss with his PCP     Primary osteoarthritis of right hip  Assessment & Plan  - s/p R THR 5/24/22    -  DVT prophylaxis and pain management per ortho service  - Pulmonary toilet  - Ambulate  - Hold all natural supplements. Resume post op when ok to do so per ortho             Subjective   Seen and examined in PACU. Tolerated procedure well, reports expected level of pain. .Denies headache, CP, SOB TORO or palp       Current Medications:    Scheduled:      Infusions:  • lactated ringer's   125 mL/hr at 05/24/22 0709   • lactated ringer's   125 mL/hr at 05/24/22 1048       PRN:  •  glucose **OR** dextrose **OR** glucagon **OR** dextrose in water  •  fentaNYL  •  HYDROmorphone  •  ondansetron     Objective   Vital signs in last 24 hours:  Temp:  [36.4 °C (97.6 °F)-36.8 °C (98.2 °F)] 36.8 °C (98.2 °F)  Heart Rate:  [39-69] 39  Resp:  [8-19] 14  BP: ()/(60-86) 134/77    Wt Readings from Last 3 Encounters:   05/24/22 77.1 kg (169 lb 14.4 oz)   05/19/22 79 kg (174 lb 1.6 oz)   05/19/22 78.9 kg (174 lb)       Physical Exam  Vitals reviewed.   HENT:      Head: Normocephalic.      Nose: Nose normal.      Mouth/Throat:      Mouth: Mucous membranes are dry.   Eyes:      General: No scleral icterus.  Cardiovascular:      Rate and Rhythm: Regular rhythm. Bradycardia present.      Heart sounds: No murmur heard.  Pulmonary:      Comments: Decreased at bases  Abdominal:      General: Abdomen is flat.      Palpations: Abdomen  is soft.   Genitourinary:     Comments: NASSAR   Musculoskeletal:      Cervical back: Normal range of motion.      Comments: Decreased R LE ROM    Skin:     General: Skin is warm and dry.      Coloration: Skin is pale.      Comments: R hip incision not well visualized    Neurological:      General: No focal deficit present.      Mental Status: He is alert and oriented to person, place, and time.   Psychiatric:         Mood and Affect: Mood normal.         Behavior: Behavior normal.            Labs and Data:      Hematology  Lab Results   Component Value Date    WBC 4.70 05/19/2022    HGB 14.5 05/19/2022    HCT 42.4 05/19/2022     05/19/2022    INR 1.0 05/19/2022       Chemistries  Lab Results   Component Value Date     05/19/2022    K 4.2 05/19/2022     05/19/2022    CREATININE 0.5 (L) 05/19/2022    BUN 15 05/19/2022    CO2 27 05/19/2022    GLUCOSE 97 05/19/2022    CALCIUM 9.9 05/19/2022       Biomarkers  No results found for: CKTOTAL, HSTROPONINI, BNP    Cholesterol  No results found for: CHOL, TRIG, HDL, LDLCALC, NONHDLCALC    Endocrine  Lab Results   Component Value Date    HGBA1C 5.6 05/19/2022      Radiology:  I have independently reviewed the patient's pertinent imaging for this hospital visit. Pertinent Imaging findings are within normal limits.    X-RAY HIP WITH OR WITHOUT PELVIS 1 VW RIGHT    Result Date: 5/24/2022  IMPRESSION: Satisfactory postoperative hip.       Cardiology:    Telemetry  sinus bradycardia     ECG   Not applicable.              LYNDA Jones  5/24/2022

## 2022-05-24 NOTE — ASSESSMENT & PLAN NOTE
- s/p R THR 5/24/22    -  DVT prophylaxis and pain management per ortho service  - Pulmonary toilet  - Ambulate  - Hold all natural supplements. Resume post op when ok to do so per ortho

## 2022-05-24 NOTE — PLAN OF CARE
Problem: Adult Inpatient Plan of Care  Goal: Plan of Care Review  Outcome: Progressing  Flowsheets (Taken 5/24/2022 1522)  Progress: (eval only) --  Plan of Care Reviewed With: patient  Outcome Summary: PT eval completed     Problem: Joint Function Impaired (Hip Arthroplasty)  Goal: Optimal Functional Ability  Outcome: Progressing

## 2022-05-24 NOTE — PROGRESS NOTES
Patient: Zack Ramirez  Location:  [address] [address]  MRN:  [id number]  Today's date:  5/24/2022     RN cleared pt for PT eval/tx. Pt left in chair, chair alarm in place and activated, SCD's in place, call bell & personal items within reach & all stated needs met. Nurse aware of pt's status/performance/positioning.      Zack is a 66 y.o. male admitted on 5/24/2022 with Osteoarthritis of right hip, unspecified osteoarthritis type [M16.11]  Osteoarthritis [M19.90].   Past Medical History  Zack has a past medical history of Arthritis, COVID-19 vaccine series declined, Glaucoma, and Pituitary tumor.    History of Present Illness   S/p R ANGELA 5/24, Dr. Man, WBAT, no prec per orders      PT Vitals    Date/Time Pulse SpO2 Pt Activity O2 Therapy BP BP Location BP Method Pt Position [facility]   05/24/22 1343 58 99 % At rest None (Room air) 94/66 Right upper arm Automatic Lying KM   05/24/22 1348 -- -- -- -- 95/58 Right upper arm Automatic Sitting KM   05/24/22 1404 -- -- -- -- 86/56 Right upper arm Automatic Standing KM      PT Pain    Date/Time Pain Type Side/Orientation Location Rating: Rest Rating: Activity Interventions [facility]   05/24/22 1343 Pain Assessment right hip 2 4 position adjusted;premedicated for activity;cold applied KM          Prior Living Environment    Flowsheet Row Most Recent Value   People in Home alone   Current Living Arrangements apartment   Home Accessibility stairs to enter home (Group)   Living Environment Comment 1SH, 14 ZOE bilat rail, walk-in shower w/ grab bar        Prior Level of Function    Flowsheet Row Most Recent Value   Ambulation assistive equipment   Transferring independent   Toileting independent   Bathing independent   Dressing independent   Prior Level of Function Comment uses SPC for ambul, otherwise indep all mob/self-care/ADL's, works FT   Assistive Device Currently Used at Home cane, straight           PT Evaluation and Treatment - 05/24/22 1343        PT Time Calculation     Start Time 1343     Stop Time 1412     Time Calculation (min) 29 min        Session Details    Document Type initial evaluation     Mode of Treatment physical therapy        General Information    Patient Profile Reviewed yes     Onset of Illness/Injury or Date of Surgery 05/24/22     Referring Physician favian     Patient/Family/Caregiver Comments/Observations RN cleared pt for PT evval     General Observations of Patient Pt rec'd in bed, agreeable to PT eval     Existing Precautions/Restrictions fall;weight bearing        Weight-bearing Status    Right LE Weight-Bearing Status weight-bearing as tolerated (WBAT)        Living Environment    Primary Care Provided by self        Cognition/Psychosocial    Affect/Mental Status (Cognition) flat/blunted affect     Behavioral Issues (Cognition) withdrawn     Orientation Status (Cognition) oriented x 4     Follows Commands (Cognition) WFL        Sensory Assessment (Somatosensory)    Sensory Assessment (Somatosensory) sensation intact;bilateral LE        Range of Motion (ROM)    Left Lower Extremity (ROM) left LE ROM is WFL     Right Lower Extremity (ROM) right LE ROM is WFL except;hip     Hip, Right (ROM) flex 90, abd 10, add 0, ext 0     Comment: Range of Motion pain-limited        Strength (Manual Muscle Testing)    Left Lower Extremity Strength left LE strength is WFL     Hip, Right (Strength) 2- t/o all planes, pain-limited     Knee, Right (Strength) ext 3+        Edema Assessment & Management    Location (Edema) lower extremity, right        Lower Extremity Edema Measures, Right    Lower Extremity, Right (Edema) mid-thigh        Edema Symptoms/Interventions    Description (Edema) localized   mild    Associated Symptoms (Edema) pain;joint contracture/ROM impairments     Treatment Interventions (Edema) cryotherapy;elevation;positioning;mechanical compression device        Bed Mobility    [place], Supine to Sit close supervision;supervision     [place], Sit  to Supine minimum assist (75% or more patient effort)     Assistive Device head of bed elevated;matress firmed     Comment (Bed Mobility) 2 trials supine to sit; first close superv, 2nd superv.  Min A R LE mgt sit to supine        Sit to Stand Transfer    [place], Sit to Stand Transfer verbal cues;touching/steadying assist;close supervision     Verbal Cues hand placement;safety;proper use of assistive device     Assistive Device walker, front-wheeled     Comment from EOB x 2; CG first trial, close superv second trial.  Educ pt in safe transfer technique        Stand to Sit Transfer    [place], Stand to Sit Transfer touching/steadying assist;close supervision;verbal cues     Verbal Cues hand placement;preparatory posture;proper use of assistive device;technique     Assistive Device walker, front-wheeled   arm rests    Comment x 1 to EOB, x 1 to armed chair; educ pt in safe transfer technique        Gait Training    [place], Gait touching/steadying assist;verbal cues     Assistive Device walker, front-wheeled     Distance in Feet 3 feet     Pattern (Gait) step-to     Deviations/Abnormal Patterns (Gait) antalgic;base of support, wide;gait speed decreased;step length decreased     Maintains Weight-bearing Status (Gait) physical assist to maintain     Comment (Gait/Stairs) mildly unsteady w/ RW, antalgic R LE, vc's for RW positioning for safety.        Stairs Training    [place], Stairs not tested     Comment tba        Safety Issues, Functional Mobility    Impairments Affecting Function (Mobility) balance;pain;range of motion (ROM);strength        Balance    Static Sitting Balance WNL;unsupported;sitting, edge of bed     Static Standing Balance WFL;supported;asymmetrical weight shifting     Dynamic Standing Balance mild impairment;supported;asymmetrical weight shifting        Motor Skills    Functional Endurance poor today due to low resting BP and + orthostatic hypotension w/ symptoms         Therapeutic Exercise    Therapeutic Exercise --   HEP educ: QS, GS, ankle pumps       AM-PAC (TM) - Mobility (Current Function)    Turning from your back to your side while in a flat bed without using bedrails? 4 - None     Moving from lying on your back to sitting on the side of a flat bed without using bedrails? 4 - None     Moving to and from a bed to a chair? 3 - A Little     Standing up from a chair using your arms? 3 - A Little     To walk in a hospital room? 3 - A Little     Climbing 3-5 steps with a railing? 3 - A Little     AM-PAC (TM) Mobility Score 20        Assessment/Plan (PT)    Daily Outcome Statement Pt seen for PT eval POD 0 s/p RTHA.  Mobility superrv to CG level today, and expect pt to progress to mod I per goals quickly for d/c home indep/alone POD 1-2.     Rehab Potential good, to achieve stated therapy goals     Therapy Frequency 5-7 times/wk     Planned Therapy Interventions balance training;bed mobility training;gait training;home exercise program;patient/family education;ROM (range of motion);stair training;strengthening;transfer training               PT Assessment/Plan    Flowsheet Row Most Recent Value   PT Recommended Discharge Disposition home with home health, home with outpatient services at 05/24/2022 1343   Anticipated Equipment Needs at Discharge (PT) walker, front-wheeled, commode, 3-in-1 at 05/24/2022 1343   Patient/Family Therapy Goals Statement to go home asap at 05/24/2022 1343                    Education Documentation  Joint Mobility/Strength, taught by Dinah Rader, PT at 5/24/2022  3:23 PM.  Learner: Patient  Readiness: Acceptance  Method: Explanation, Demonstration  Response: Verbalizes Understanding  Comment: PT role/goals/POC; fxnl mob tech and safety- transfers, ambul w/ RW; fall prev; HEP    Home Safety, taught by Dinah Rader, PT at 5/24/2022  3:23 PM.  Learner: Patient  Readiness: Acceptance  Method: Explanation, Demonstration  Response: Verbalizes  Understanding  Comment: PT role/goals/POC; fxnl mob tech and safety- transfers, ambul w/ RW; fall prev; HEP    Assistive/Adaptive Devices, taught by Dinah Rader PT at 5/24/2022  3:23 PM.  Learner: Patient  Readiness: Acceptance  Method: Explanation, Demonstration  Response: Verbalizes Understanding  Comment: PT role/goals/POC; fxnl mob tech and safety- transfers, ambul w/ RW; fall prev; HEP    Call Light Use, taught by Dinah Rader PT at 5/24/2022  3:23 PM.  Learner: Patient  Readiness: Acceptance  Method: Explanation, Demonstration  Response: Verbalizes Understanding  Comment: PT role/goals/POC; fxnl mob tech and safety- transfers, ambul w/ RW; fall prev; HEP    Treatment Plan, taught by Dinah Rader PT at 5/24/2022  3:23 PM.  Learner: Patient  Readiness: Acceptance  Method: Explanation, Demonstration  Response: Verbalizes Understanding  Comment: PT role/goals/POC; fxnl mob tech and safety- transfers, ambul w/ RW; fall prev; HEP          PT Goals    Flowsheet Row Most Recent Value   Bed Mobility Goal 1    Activity/Assistive Device sit to supine/supine to sit at 05/24/2022 1343   [place] modified independence, independent at 05/24/2022 1343   Time Frame by discharge at 05/24/2022 1343   Progress/Outcome goal ongoing at 05/24/2022 1343   Transfer Goal 1    Activity/Assistive Device sit-to-stand/stand-to-sit, walker, front-wheeled at 05/24/2022 1343   [place] modified independence at 05/24/2022 1343   Time Frame by discharge at 05/24/2022 1343   Progress/Outcome goal ongoing at 05/24/2022 1343   Transfer Goal 2    Activity/Assistive Device car transfer, walker, front-wheeled at 05/24/2022 1343   [place] supervision required at 05/24/2022 1343   Time Frame by discharge at 05/24/2022 1343   Progress/Outcome goal ongoing at 05/24/2022 1343   Gait Training Goal 1    Activity/Assistive Device walker, front-wheeled, increase endurance/gait distance, improve balance and speed, diminish gait deviation at  05/24/2022 1343   [place] modified independence at 05/24/2022 1343   Distance 150 at 05/24/2022 1343   Time Frame by discharge at 05/24/2022 1343   Progress/Outcome goal ongoing at 05/24/2022 1343   Stairs Goal 1    Activity/Assistive Device ascending stairs, descending stairs, using handrail, left, using handrail, right, step-to-step, decrease fall risk at 05/24/2022 1343   [place] modified independence at 05/24/2022 1343   Number of Stairs 12 at 05/24/2022 1343   Time Frame by discharge at 05/24/2022 1343   Progress/Outcome goal ongoing at 05/24/2022 1343

## 2022-05-24 NOTE — ANESTHESIA PROCEDURE NOTES
Spinal Block    Patient location during procedure: holding area  Start time: 5/24/2022 6:57 AM  End time: 5/24/2022 7:01 AM  Staffing  Performed: anesthesiologist   Anesthesiologist: Colin Servin MD  Resident/CRNA: Martha Dodson CRNA  Reason for block: at surgeon's request  Preanesthetic Checklist  Completed: patient identified, site marked, surgical consent, pre-op evaluation, timeout performed, IV checked, risks and benefits discussed, monitors and equipment checked and sterile field maintained during procedure  Spinal Block  Patient position: sitting  Prep: ChloraPrep and site prepped and draped  Patient monitoring: heart rate, cardiac monitor, continuous pulse ox and blood pressure  Approach: midline  Location: L3-4  Injection technique: single-shot  Needle  Needle type: Sprotte   Needle gauge: 24 G  Needle length: 3.5 in  Assessment  Sensory level: T10  Events: cerebrospinal fluid  Additional Notes  .Procedure well tolerated. Vital signs stable. No paresthesia.  Analgesia satisfactory. Patients nurse to notify anesthesiologist if hemodynamically unstable. 1% lido wheal at L3-L4.    Medications Administered - 5/24/2022 7:01 AM   bupivacaine PF (MARCAINE SPINAL) 0.75% intrathecal injection - intrathecal   1.8 mL - 5/24/2022 7:01:00 AM

## 2022-05-24 NOTE — ASSESSMENT & PLAN NOTE
- Hx of pituitary tumor s/p resection -- on monthly octreotide injections, managed by his OP endocrinology

## 2022-05-24 NOTE — HOSPITAL COURSE
Zack is a 66 y.o. male admitted on 5/24/2022 with Osteoarthritis of right hip, unspecified osteoarthritis type [M16.11]  Osteoarthritis [M19.90].   Past Medical History  Zack has a past medical history of Arthritis, COVID-19 vaccine series declined, Glaucoma, and Pituitary tumor.    History of Present Illness   S/p R ANGELA 5/24, Dr. Man, WBAT, no prec per orders

## 2022-05-24 NOTE — PLAN OF CARE
Problem: Adult Inpatient Plan of Care  Goal: Plan of Care Review  Flowsheets (Taken 5/24/2022 1422)  Plan of Care Reviewed With: patient     Problem: Adult Inpatient Plan of Care  Goal: Patient-Specific Goal (Individualized)  Flowsheets (Taken 5/24/2022 1422)  Patient-Specific Goals (Include Timeframe): To go home     Problem: Adult Inpatient Plan of Care  Goal: Readiness for Transition of Care  Intervention: Mutually Develop Transition Plan  Flowsheets (Taken 5/24/2022 1422)  Anticipated Discharge Disposition: home without assistance or services  Equipment Needed After Discharge: walker, front-wheeled  Discharge Coordination/Progress: Please see SW note  Assistive Device/Animal Currently Used at Home: cane, straight  Anticipated Changes Related to Illness: none  Transportation Concerns: car, none  Readmission Within the Last 30 Days: no previous admission in last 30 days  Patient/Family Anticipated Services at Transition: none  Patient/Family Anticipates Transition to: home  Transportation Anticipated: family or friend will provide  Concerns to be Addressed:   no discharge needs identified   denies needs/concerns at this time       Social Work Note: Met with pt in the room, introduced self, verified ID, demographic info, and PCP.   PCP: Jose England  Pharmacy: [facility]    Housing: Patient lives alone in an apartment with 14 steps to enter.     PLOF:  Patient is independent with ADL's.      DME: Pt has a cane, which he uses occasionally  Will need to obtain walker from PT.    Anticipated Disposition:  Anticipate discharge to home.  No skilled needs identified at this time. Pt will have a ride home upon d/c.    Will continue to follow.    Plan of care discussed with pt.

## 2022-05-24 NOTE — ANESTHESIA POSTPROCEDURE EVALUATION
Patient: Zcak Ramirez    Procedure Summary     Date: 05/24/22 Room / Location:  OR 6 /  OR    Anesthesia Start: 0709 Anesthesia Stop: 0844    Procedure: Right total hip replacement (Right Hip) Diagnosis:       Osteoarthritis of right hip, unspecified osteoarthritis type      (Rt hip djd)    Surgeons: Oscar Cantor MD Responsible Provider: Colin Servin MD    Anesthesia Type: spinal ASA Status: 2          Anesthesia Type: spinal  PACU Vitals  5/24/2022 0854 - 5/24/2022 0954      5/24/2022  0858 5/24/2022  0900 5/24/2022  0910 5/24/2022  0920    BP: -- 81/60 95/66 100/67    Pulse: -- 43 42 52    Resp: -- 16 11 19    SpO2: 99 % 99 % 99 % 97 %              5/24/2022  0930 5/24/2022  0940 5/24/2022  0945 5/24/2022  0950    BP: 101/71 113/74 -- 109/74    Pulse: 40 41 40 40    Resp: 11 8 -- 14    SpO2: 100 % 100 % 100 % 100 %            Anesthesia Post Evaluation    Pain management: adequate  Mode of pain management: IV medication  Patient location during evaluation: PACU  Patient participation: complete - patient participated  Level of consciousness: awake and alert  Cardiovascular status: acceptable  Airway Patency: adequate  Respiratory status: acceptable  Hydration status: acceptable  Anesthetic complications: no

## 2022-05-24 NOTE — ANESTHESIOLOGIST PRE-PROCEDURE ATTESTATION
Pre-Procedure Patient Identification:  I am the Primary Anesthesiologist and have identified the patient on 05/24/22 at 6:08 AM.   I have confirmed the procedure(s) will be performed by the following surgeon/proceduralist Oscar Cantor MD.

## 2022-05-24 NOTE — OR SURGEON
Pre-Procedure patient identification:  I am the primary operating surgeon/proceduralist and I have identified the patient on 05/24/22 at 6:23 AM Oscar Cantor MD  Phone Number: [phone]

## 2022-05-25 VITALS
TEMPERATURE: 97.9 F | RESPIRATION RATE: 14 BRPM | HEART RATE: 50 BPM | HEIGHT: 70 IN | OXYGEN SATURATION: 98 % | BODY MASS INDEX: 24.32 KG/M2 | SYSTOLIC BLOOD PRESSURE: 129 MMHG | WEIGHT: 169.9 LBS | DIASTOLIC BLOOD PRESSURE: 78 MMHG

## 2022-05-25 LAB
ANION GAP SERPL CALC-SCNC: 6 MEQ/L (ref 3–15)
BUN SERPL-MCNC: 21 MG/DL (ref 8–20)
CALCIUM SERPL-MCNC: 8.7 MG/DL (ref 8.9–10.3)
CHLORIDE SERPL-SCNC: 106 MEQ/L (ref 98–109)
CO2 SERPL-SCNC: 23 MEQ/L (ref 22–32)
CREAT SERPL-MCNC: 0.6 MG/DL (ref 0.8–1.3)
ERYTHROCYTE [DISTWIDTH] IN BLOOD BY AUTOMATED COUNT: 14.1 % (ref 11.6–14.4)
GFR SERPL CREATININE-BSD FRML MDRD: >60 ML/MIN/1.73M*2
GLUCOSE SERPL-MCNC: 101 MG/DL (ref 70–99)
HCT VFR BLDCO AUTO: 37.6 % (ref 40.1–51)
HGB BLD-MCNC: 12.7 G/DL (ref 13.7–17.5)
MCH RBC QN AUTO: 32.4 PG (ref 28–33.2)
MCHC RBC AUTO-ENTMCNC: 33.8 G/DL (ref 32.2–36.5)
MCV RBC AUTO: 95.9 FL (ref 83–98)
PDW BLD AUTO: 10 FL (ref 9.4–12.4)
PLATELET # BLD AUTO: 224 K/UL (ref 150–350)
POTASSIUM SERPL-SCNC: 4.2 MEQ/L (ref 3.6–5.1)
RBC # BLD AUTO: 3.92 M/UL (ref 4.5–5.8)
SODIUM SERPL-SCNC: 135 MEQ/L (ref 136–144)
WBC # BLD AUTO: 7.91 K/UL (ref 3.8–10.5)

## 2022-05-25 PROCEDURE — 80048 BASIC METABOLIC PNL TOTAL CA: CPT | Performed by: NURSE PRACTITIONER

## 2022-05-25 PROCEDURE — 97535 SELF CARE MNGMENT TRAINING: CPT | Mod: GO

## 2022-05-25 PROCEDURE — 99213 OFFICE O/P EST LOW 20 MIN: CPT | Performed by: INTERNAL MEDICINE

## 2022-05-25 PROCEDURE — 63700000 HC SELF-ADMINISTRABLE DRUG: Performed by: NURSE PRACTITIONER

## 2022-05-25 PROCEDURE — 97166 OT EVAL MOD COMPLEX 45 MIN: CPT | Mod: GO

## 2022-05-25 PROCEDURE — 63600000 HC DRUGS/DETAIL CODE: Performed by: NURSE PRACTITIONER

## 2022-05-25 PROCEDURE — 97116 GAIT TRAINING THERAPY: CPT | Mod: GP,CQ

## 2022-05-25 PROCEDURE — 36415 COLL VENOUS BLD VENIPUNCTURE: CPT | Performed by: NURSE PRACTITIONER

## 2022-05-25 PROCEDURE — 85027 COMPLETE CBC AUTOMATED: CPT | Performed by: NURSE PRACTITIONER

## 2022-05-25 RX ORDER — ASPIRIN 81 MG/1
81 TABLET ORAL 2 TIMES DAILY
Qty: 60 TABLET | Refills: 0 | Status: SHIPPED | OUTPATIENT
Start: 2022-05-25 | End: 2022-06-24

## 2022-05-25 RX ORDER — ACETAMINOPHEN 325 MG/1
650 TABLET ORAL EVERY 6 HOURS PRN
Refills: 0
Start: 2022-05-25 | End: 2022-06-24

## 2022-05-25 RX ORDER — AMOXICILLIN 250 MG
1 CAPSULE ORAL 2 TIMES DAILY PRN
Start: 2022-05-25 | End: 2022-06-24

## 2022-05-25 RX ORDER — OXYCODONE HYDROCHLORIDE 5 MG/1
5 TABLET ORAL EVERY 6 HOURS PRN
Qty: 10 TABLET | Refills: 0 | Status: SHIPPED | OUTPATIENT
Start: 2022-05-25 | End: 2022-05-28

## 2022-05-25 RX ORDER — POLYETHYLENE GLYCOL 3350 17 G/17G
17 POWDER, FOR SOLUTION ORAL DAILY PRN
Start: 2022-05-25 | End: 2022-06-24

## 2022-05-25 RX ADMIN — KETOROLAC TROMETHAMINE 15 MG: 15 INJECTION, SOLUTION INTRAMUSCULAR; INTRAVENOUS at 05:17

## 2022-05-25 RX ADMIN — DEXAMETHASONE SODIUM PHOSPHATE 10 MG: 4 INJECTION, SOLUTION INTRAMUSCULAR; INTRAVENOUS at 07:24

## 2022-05-25 RX ADMIN — BRIMONIDINE TARTRATE 1 DROP: 2 SOLUTION/ DROPS OPHTHALMIC at 07:31

## 2022-05-25 RX ADMIN — TIMOLOL MALEATE 1 DROP: 5 SOLUTION/ DROPS OPHTHALMIC at 07:24

## 2022-05-25 RX ADMIN — KETOROLAC TROMETHAMINE 15 MG: 15 INJECTION, SOLUTION INTRAMUSCULAR; INTRAVENOUS at 00:52

## 2022-05-25 RX ADMIN — PANTOPRAZOLE SODIUM 40 MG: 40 TABLET, DELAYED RELEASE ORAL at 07:23

## 2022-05-25 RX ADMIN — ASPIRIN 81 MG: 81 TABLET, COATED ORAL at 07:24

## 2022-05-25 RX ADMIN — THERA TABS 1 TABLET: TAB at 07:23

## 2022-05-25 ASSESSMENT — COGNITIVE AND FUNCTIONAL STATUS - GENERAL
EATING MEALS: 4 - NONE
HELP NEEDED FOR PERSONAL GROOMING: 4 - NONE
WALKING IN HOSPITAL ROOM: 4 - NONE
CLIMB 3 TO 5 STEPS WITH RAILING: 4 - NONE
DRESSING REGULAR LOWER BODY CLOTHING: 3 - A LITTLE
STANDING UP FROM CHAIR USING ARMS: 4 - NONE
HELP NEEDED FOR BATHING: 4 - NONE
DRESSING REGULAR UPPER BODY CLOTHING: 4 - NONE
AFFECT: WFL
TOILETING: 4 - NONE
MOVING TO AND FROM BED TO CHAIR: 4 - NONE
AFFECT: FLAT/BLUNTED AFFECT

## 2022-05-25 NOTE — PROGRESS NOTES
Patient:  Zack Ramirez  Location:  [address] [address]  MRN:  [id number]  Today's date:  5/25/2022    RN cleared pt for therapy. Pt received dressed in recliner in gym and agreeable to therapy.    Pt in recliner in gym at end of session. Therapy staff to return pt to room. NAD. RN notified of performance.    Zack is a 66 y.o. male admitted on 5/24/2022 with Osteoarthritis of right hip, unspecified osteoarthritis type [M16.11]  Osteoarthritis [M19.90].   Past Medical History  Zack has a past medical history of Arthritis, COVID-19 vaccine series declined, Glaucoma, and Pituitary tumor.    History of Present Illness   S/p R ANGELA 5/24, Dr. Man, WBAT, no prec per orders      OT Vitals    Date/Time Pulse HR Source SpO2 Pt Activity O2 Therapy BP BP Location BP Method Pt Position [facility]   05/25/22 0832 50 -- 98 % At rest None (Room air) 129/78 -- -- -- [facility]   05/25/22 0833 50 Monitor 98 % At rest None (Room air) 129/78 Right upper arm Automatic Sitting [facility]      OT Pain    Date/Time Pain Type Side/Orientation Location Rating: Rest Rating: Activity Rating: Rest Rating: Activity [facility]   05/25/22 0832 Pain Assessment right hip -- -- 0 - no pain 2 - mild pain [facility]   05/25/22 0833 Pain Assessment -- -- 0 2 -- -- [facility]          Prior Living Environment    Flowsheet Row Most Recent Value   People in Home alone   Current Living Arrangements apartment   Home Accessibility stairs to enter home (Group)   Living Environment Comment 1SH, 14 ZOE bilat rail, walk-in shower w/ grab bar        Prior Level of Function    Flowsheet Row Most Recent Value   Ambulation assistive equipment   Transferring independent   Toileting independent   Bathing independent   Dressing independent   Eating independent   Communication understands/communicates without difficulty   Prior Level of Function Comment uses SPC for ambul, otherwise indep all mob/self-care/ADL's, works FT   Assistive Device Currently Used at Home cane, straight, grab bar         Occupational Profile    Flowsheet Row Most Recent Value   Reason for Services/Referral ADL dysfunction           OT Evaluation and Treatment - 05/25/22 0827        OT Time Calculation    Start Time 0827     Stop Time 0852     Time Calculation (min) 25 min        Session Details    Document Type initial evaluation     Mode of Treatment occupational therapy        General Information    Patient Profile Reviewed yes     Onset of Illness/Injury or Date of Surgery 05/24/22     Referring Physician Sakshi     Patient/Family/Caregiver Comments/Observations RN cleared pt for therapy     General Observations of Patient Pt received dressed in recliner in gym and agreeable to therapy     Existing Precautions/Restrictions fall;weight bearing        Weight-bearing Status    Right LE Weight-Bearing Status weight-bearing as tolerated (WBAT)        Cognition/Psychosocial    Affect/Mental Status (Cognition) WFL     Orientation Status (Cognition) oriented x 4     Follows Commands (Cognition) WFL     Cognitive Function WFL        Hearing Assessment    Hearing Status WFL        Vision Assessment/Intervention    Visual Impairment/Limitations WFL        Sensory Assessment (Somatosensory)    Sensory Assessment (Somatosensory) LE sensation intact        Range of Motion (ROM)    Range of Motion ROM is WFL;bilateral upper extremities        Strength (Manual Muscle Testing)    Strength (Manual Muscle Testing) strength is WFL;bilateral upper extremities        Bed Mobility    Comment (Bed Mobility) OOB throughout session        Transfers    Transfers toilet transfer;tub transfer        Sit to Stand Transfer    [place], Sit to Stand Transfer modified independence     Assistive Device walker, front-wheeled        Stand to Sit Transfer    [place], Stand to Sit Transfer modified independence     Assistive Device walker, front-wheeled        Toilet Transfer    Transfer Technique sit-stand;stand-sit     [place], Toilet Transfer  modified independence     Assistive Device walker, front-wheeled        Tub Transfer    Transfer Technique step over, left entry     [place], Tub Transfer modified independence     Assistive Device grab bars/tub rail;walker, front-wheeled        Balance    Balance Assessment sitting static balance;sitting dynamic balance;sit to stand dynamic balance;standing static balance;standing dynamic balance     Static Sitting Balance WFL     Dynamic Sitting Balance WFL     Sit to Stand Dynamic Balance WFL     Static Standing Balance WFL     Dynamic Standing Balance WFL        Functional Reach Test    Trial One: Functional Reach Test (in) 8 inches     Trial Two: Functional Reach Test (in) 10 inches     Average (in) 9 inches        Upper Body Dressing    Comment pt dressed at start of session        Lower Body Dressing    [place] modified independence     Position supported sitting     Adaptive Equipment sock aid;long-handled shoe horn;reacher        AM-PAC (TM) - ADL (Current Function)    Putting on and taking off regular lower body clothing? 3 - A Little     Bathing? 4 - None     Toileting? 4 - None     Putting on/taking off regular upper body clothing? 4 - None     How much help for taking care of personal grooming? 4 - None     Eating meals? 4 - None     AM-PAC (TM) ADL Score 23        Assessment/Plan (OT)    Daily Outcome Statement OT eval complete. Pt mod I for ambulation and I with ADLs PTA. Today, pt mod I for functional transfers and ambulation. Pt mod I for distal LBD tasks. Pt demonstrated I in ADLs observed. No further acute OT needs. Rec DC home when medically cleared.     Rehab Potential good, to achieve stated therapy goals     Therapy Frequency evaluation only     Planned Therapy Interventions activity tolerance training;BADL retraining;functional balance retraining;occupation/activity based interventions;patient/caregiver education/training;transfer/mobility retraining               OT Assessment/Plan     Flowsheet Row Most Recent Value   OT Recommended Discharge Disposition home with assistance at 05/25/2022 0827   Anticipated Equipment Needs At Discharge (OT) none at 05/25/2022 0827   Patient/Family Therapy Goal Statement to go home at 05/25/2022 0827

## 2022-05-25 NOTE — OP NOTE
[facility]   OPERATIVE REPORT      PATIENT NAME:  CATHLEEN CARREON  MR#:    [id number]  VISIT #:   [id number]  :    1955  ADMIT DATE:   2022  PROCEDURE DATE: 2022  ROOM NUMBER:    OPERATION: TOTAL HIP ARTHROPLASTY  PREOPERATIVE DIAGNOSIS: Primary osteoarthritis right hip  POSTOPERATIVE DIAGNOSIS: Primary osteoarthritis right hip  ANESTHESIA: Spinal   SURGEON: Oscar Cantor M.D.     ASSISTANT: BHARTI Macias     EBL:   Specimens: If required by hospital rules, resected tissue was sent for routine analysis.     PROCEDURE:  After the induction of anesthesia, the patient was routinely prepped and draped for total hip arthroplasty.  Dr Oscar Cantor performed and/or supervised the key portions of the surgical procedure including bone preparation, insertion of the provisional and final components and assessment of range of motion, joint stability, and leg length.  Following draping, the right hip was exposed and the hip was dislocated.  Severe joint deterioration was noted. The acetabulum was prepared to accept a 58/46 acetabular shell.  The shell was impacted into place and a firm press fit was obtained.  A 28 liner was then inserted into the shell.  The femoral canal was prepared to accept a 6 lateral femoral stem.  Trial reduction was performed.  The final stem was impacted into place and firm fixation was obtained.  Again, trial reductions were performed.  A standard ceramic femoral head was impacted onto the femoral stem and a final reduction was performed.  After trial and final reduction, the hip was checked for leg length, stability, and range of motion.  After final reduction, the wound was  closed in layers and a sterile dressing was applied.      Corentec M Stem/Valarie Dual Mobility components were utilized for this procedure.     I performed and/or supervised the key portions of the surgical procedure including an evaluation of the bone cuts, reshaping of the  bony elements, insertion of the provisional and final components, and  assessment of range of motion and stability    A surgical assistant was required during this procedure. This assistant facilitated the safe and competent performance of the surgical operation. The assistant functions included retraction, vascular coagulation, removal of resected bone elements, suction, wound irrigation, and wound closure. The presence of this assistant during the procedure was essential.     I certify that the services for which payment is claimed were medically necessary and that no qualified resident or fellow was available to perform the services.

## 2022-05-25 NOTE — PROGRESS NOTES
Patient: Zack Ramirez  Location:  [address] [address]  MRN:  [id number]  Today's date:  5/25/2022   Pt returned to room via Therapy staff.    Zack is a 66 y.o. male admitted on 5/24/2022 with Osteoarthritis of right hip, unspecified osteoarthritis type [M16.11]  Osteoarthritis [M19.90].   Past Medical History  Zack has a past medical history of Arthritis, COVID-19 vaccine series declined, Glaucoma, and Pituitary tumor.    History of Present Illness   S/p R ANGELA 5/24, Dr. Man, WBAT, no prec per orders      PT Vitals    Date/Time Pulse HR Source SpO2 Pt Activity O2 Therapy BP BP Location BP Method Pt Position [facility]   05/25/22 0832 50 -- 98 % At rest None (Room air) 129/78 -- -- -- AK   05/25/22 0833 50 Monitor 98 % At rest None (Room air) 129/78 Right upper arm Automatic Sitting OMB      PT Pain    Date/Time Pain Type Side/Orientation Location Rating: Rest Rating: Activity Rating: Rest Rating: Activity [facility]   05/25/22 0832 Pain Assessment right hip -- -- 0 - no pain 2 - mild pain AKN   05/25/22 0833 Pain Assessment -- -- 0 2 -- -- OMB          Prior Living Environment    Flowsheet Row Most Recent Value   People in Home alone   Current Living Arrangements apartment   Home Accessibility stairs to enter home (Group)   Living Environment Comment 1SH, 14 ZOE bilat rail, walk-in shower w/ grab bar        Prior Level of Function    Flowsheet Row Most Recent Value   Ambulation assistive equipment   Transferring independent   Toileting independent   Bathing independent   Dressing independent   Prior Level of Function Comment uses SPC for ambul, otherwise indep all mob/self-care/ADL's, works FT   Assistive Device Currently Used at Home cane, straight           PT Evaluation and Treatment - 05/25/22 0832        PT Time Calculation    Start Time 0832     Stop Time 0851     Time Calculation (min) 19 min        Session Details    Document Type daily treatment/progress note     Mode of Treatment physical therapy         General Information    Patient Profile Reviewed yes     Referring Physician Sakshi     General Observations of Patient Pt seen in gym     Existing Precautions/Restrictions weight bearing        Weight-bearing Status    Right LE Weight-Bearing Status weight-bearing as tolerated (WBAT)        Cognition/Psychosocial    Affect/Mental Status (Cognition) flat/blunted affect     Orientation Status (Cognition) oriented x 4     Follows Commands (Cognition) WNL        Bed Mobility    [place], Supine to Sit independent     [place], Sit to Supine independent     Comment (Bed Mobility) On flat surface        Transfers    Transfers car transfer     Maintains Weight-Bearing Status (Transfers) able to maintain        Sit to Stand Transfer    [place], Sit to Stand Transfer modified independence     Assistive Device walker, front-wheeled        Stand to Sit Transfer    [place], Stand to Sit Transfer modified independence     Assistive Device walker, front-wheeled        Car Transfer    Transfer Technique sit-stand;stand-sit     [place], Car Transfer modified independence     Assistive Device walker, front-wheeled        Gait Training    [place], Gait modified independence     Assistive Device walker, front-wheeled     Distance in Feet 150 feet     Pattern (Gait) step-through     Deviations/Abnormal Patterns (Gait) antalgic     Maintains Weight-bearing Status (Gait) able to maintain     Advanced Gait Activity 10 Meter Walk Test (Self-Selected Velocity)     Comment (Gait/Stairs) safe,steady gait        10 Meter Walk Test (Self-Selected Velocity)    Trial One: Ten Meter Walk Test (sec) 13.32 seconds     Trial Two: Ten Meter Walk Test (sec) 12.01 seconds     Trial One: Gait Speed (m/s) 0.45 m/s     Trial Two: Gait Speed (m/s) 0.5 m/s     Average Gait Speed (m/s): Two Trials 0.48 m/s        Stairs Training    [place], Stairs modified independence     Assistive Device railing     Handrail Location  (Stairs) right side (ascending)     Number of Stairs 12     Stair Height 6 inches     Ascending Stairs Technique step-to-step     Descending Stairs Technique step-to-step     Maintains Weight-bearing Status (Stairs) able to maintain        Balance    Balance Assessment sitting static balance;sitting dynamic balance;sit to stand dynamic balance;standing static balance;standing dynamic balance     Static Sitting Balance WNL     Dynamic Sitting Balance WNL     Sit to Stand Dynamic Balance WFL     Static Standing Balance WFL;supported     Dynamic Standing Balance mild impairment        AM-PAC (TM) - Mobility (Current Function)    Turning from your back to your side while in a flat bed without using bedrails? 4 - None     Moving from lying on your back to sitting on the side of a flat bed without using bedrails? 4 - None     Moving to and from a bed to a chair? 4 - None     Standing up from a chair using your arms? 4 - None     To walk in a hospital room? 4 - None     Climbing 3-5 steps with a railing? 4 - None     AM-PAC (TM) Mobility Score 24        Assessment/Plan (PT)    Daily Outcome Statement Pt is IND with mobility with rw, has achieved all goals, has been issued walker, and is safe for D/C when cleared medically.               PT Assessment/Plan    Flowsheet Row Most Recent Value   PT Recommended Discharge Disposition home with assistance, home with outpatient services at 05/25/2022 0832   Anticipated Equipment Needs at Discharge (PT) walker, front-wheeled  [Has been issued] at 05/25/2022 0832   Patient/Family Therapy Goals Statement to go home asap at 05/24/2022 1343                         PT Goals    Flowsheet Row Most Recent Value   Bed Mobility Goal 1    Activity/Assistive Device sit to supine/supine to sit at 05/24/2022 1343   [place] modified independence, independent at 05/24/2022 1343   Time Frame by discharge at 05/24/2022 1343   Progress/Outcome goal met at 05/25/2022 0832   Transfer Goal 1     Activity/Assistive Device sit-to-stand/stand-to-sit, walker, front-wheeled at 05/24/2022 1343   [place] modified independence at 05/24/2022 1343   Time Frame by discharge at 05/24/2022 1343   Progress/Outcome goal met at 05/25/2022 0832   Transfer Goal 2    Activity/Assistive Device car transfer, walker, front-wheeled at 05/24/2022 1343   [place] supervision required at 05/24/2022 1343   Time Frame by discharge at 05/24/2022 1343   Progress/Outcome goal ongoing at 05/24/2022 1343   Gait Training Goal 1    Activity/Assistive Device walker, front-wheeled, increase endurance/gait distance, improve balance and speed, diminish gait deviation at 05/24/2022 1343   [place] modified independence at 05/24/2022 1343   Distance 150 at 05/24/2022 1343   Time Frame by discharge at 05/24/2022 1343   Progress/Outcome goal met at 05/25/2022 0832   Stairs Goal 1    Activity/Assistive Device ascending stairs, descending stairs, using handrail, left, using handrail, right, step-to-step, decrease fall risk at 05/24/2022 1343   [place] modified independence at 05/24/2022 1343   Number of Stairs 12 at 05/24/2022 1343   Time Frame by discharge at 05/24/2022 1343   Progress/Outcome goal met at 05/25/2022 0832

## 2022-05-25 NOTE — PLAN OF CARE
Problem: Adult Inpatient Plan of Care  Goal: Plan of Care Review  Outcome: Progressing  Flowsheets (Taken 5/25/2022 0334)  Progress: improving  Plan of Care Reviewed With: patient  Outcome Summary: Pt denied having any pain, up in a recliner, ambulates in the room, vitals stable, schaefer d/c, pt is voiding, dressing is intact, pt had a restful night.  Goal: Patient-Specific Goal (Individualized)  Outcome: Progressing  Goal: Absence of Hospital-Acquired Illness or Injury  Outcome: Progressing  Goal: Optimal Comfort and Wellbeing  Outcome: Progressing  Goal: Readiness for Transition of Care  Outcome: Progressing     Problem: Bleeding (Hip Arthroplasty)  Goal: Absence of Bleeding  Outcome: Progressing     Problem: Bowel Motility Impaired (Hip Arthroplasty)  Goal: Effective Bowel Elimination  Outcome: Progressing     Problem: Infection (Hip Arthroplasty)  Goal: Absence of Infection Signs and Symptoms  Outcome: Progressing     Problem: Joint Function Impaired (Hip Arthroplasty)  Goal: Optimal Functional Ability  Outcome: Progressing     Problem: Ongoing Anesthesia Effects (Hip Arthroplasty)  Goal: Anesthesia/Sedation Recovery  Outcome: Progressing     Problem: Pain (Hip Arthroplasty)  Goal: Acceptable Pain Control  Outcome: Progressing     Problem: Postoperative Nausea and Vomiting (Hip Arthroplasty)  Goal: Nausea and Vomiting Relief  Outcome: Progressing     Problem: Postoperative Urinary Retention (Hip Arthroplasty)  Goal: Effective Urinary Elimination  Outcome: Progressing

## 2022-05-25 NOTE — PLAN OF CARE
Problem: Adult Inpatient Plan of Care  Goal: Plan of Care Review  Outcome: Progressing  Flowsheets (Taken 5/25/2022 1119)  Plan of Care Reviewed With: patient  Outcome Summary: OT eval complete. Pt mod I for ambulation and I with ADLs PTA. Today, pt mod I for functional transfers and ambulation. Pt mod I for distal LBD tasks. Pt demonstrated I in ADLs observed. No further acute OT needs. Rec DC home when medically cleared.     Problem: Self-Care Deficit  Goal: Improved Ability to Complete Activities of Daily Living  Outcome: Met

## 2022-05-25 NOTE — PROGRESS NOTES
Pt seen & examined. Pt reports pain controlled. No complaints.    A+Ox3, AVSS, NAD  +DF/PF/EHL, + DP/PT b/l, capillary refill brisk, SILT, compartments soft, nontender  Right hip aquacell dressing clean, dry and intact with expected postop edema     Hg 12.7    A/P: s/p Procedure(s) (LRB):  Right total hip replacement (Right) POD #1  DVT ppx: multimodal- SCDS & aspirin 81 mg po bid x 4 weeks  Pain management  Bowel regimen  WBAT  PT/OT per [facility] hip protocol   Acute anemiadue to acute blood loss, expected from surgery  Consult: [facility]  Dispo planning: d/c when stable per PT/OT/Cards/Medicine likely home today

## 2022-05-25 NOTE — PATIENT CARE CONFERENCE
Care Progression Rounds Note  Date: 5/25/2022  Time: 10:21 AM     Patient Name: Zack Ramirez     Medical Record Number: [id number]   YOB: 1955  Sex: Male      Room/Bed: [address]D    Admitting Diagnosis: Osteoarthritis of right hip, unspecified osteoarthritis type [M16.11]  Osteoarthritis [M19.90]   Admit Date/Time: 5/24/2022  4:19 AM    Primary Diagnosis: No Principal Problem: There is no principal problem currently on the Problem List. Please update the Problem List and refresh.  Principal Problem: No Principal Problem: There is no principal problem currently on the Problem List. Please update the Problem List and refresh.    GMLOS: pending  Anticipated Discharge Date: 5/25/2022    AM-PAC:  Mobility Score: 20    Discharge Planning:  Current Living Arrangements: apartment  Concerns to be Addressed: no discharge needs identified, denies needs/concerns at this time  Anticipated Discharge Disposition: home without assistance or services    Barriers to Discharge:  None    Comments:       Participants:  , nursing, occupational therapy, social work/services, physical therapy

## 2022-05-25 NOTE — PROGRESS NOTES
Inpatient Cardiology   Daily Progress Note       Overview:  - s/p R THR 5/24/22    - Medically stable for discharge once cleared by PT and ortho.     Assessment/Plan   Bradycardia  Assessment & Plan  - PAT EKG HR 49, HR in PACU 45-60  - BP stable    - No treatment needed for asymptomatic tati     History of pituitary tumor  Assessment & Plan  - Hx of pituitary tumor s/p resection -- on monthly octreotide injections, managed by his OP endocrinology    Primary osteoarthritis of right hip  Assessment & Plan  - s/p R THR 5/24/22    -  DVT prophylaxis and pain management per ortho service  - Pulmonary toilet  - Ambulate  - Hold all natural supplements. Resume post op when ok to do so per ortho       Hyperlipidemia  Assessment & Plan  -  on February 2021. Did not wish to start statin as OP     - Plans to discuss with his PCP           Subjective   Patient seen and examined.  Surgical pain controlled.  Patient denies any chest pain, dyspnea, palpitations, or nausea.      Current Medications:    Scheduled:  • acetaminophen  975 mg oral q8h GILES   • aspirin  81 mg oral BID   • brimonidine  1 drop Left Eye BID    And   • timolol  1 drop Left Eye BID   • ketorolac  15 mg intravenous q6h GILES   • multivitamin  1 tablet oral Daily   • pantoprazole  40 mg oral Daily before breakfast   • polyethylene glycol  17 g oral Daily   • sennosides-docusate sodium  1 tablet oral BID       Infusions:      PRN:  •  alum-mag hydroxide-simeth  •  bisacodyL  •  diphenhydrAMINE **OR** diphenhydrAMINE  •  oxyCODONE **AND** morphine  •  ondansetron ODT **OR** ondansetron  •  tiZANidine     Objective   Vital signs in last 24 hours:  Temp:  [36.5 °C (97.7 °F)-36.9 °C (98.4 °F)] 36.6 °C (97.9 °F)  Heart Rate:  [39-60] 50  Resp:  [12-21] 14  BP: ()/(56-91) 129/78    Wt Readings from Last 3 Encounters:   05/24/22 77.1 kg (169 lb 14.4 oz)   05/19/22 79 kg (174 lb 1.6 oz)   05/19/22 78.9 kg (174 lb)       Physical Exam  Constitutional:        General: He is not in acute distress.     Appearance: Normal appearance.   HENT:      Head: Normocephalic.   Eyes:      Extraocular Movements: Extraocular movements intact.   Neck:      Vascular: No JVD.   Cardiovascular:      Rate and Rhythm: Regular rhythm. Bradycardia present.      Heart sounds: Normal heart sounds. No murmur heard.  Pulmonary:      Breath sounds: Normal breath sounds. No wheezing, rhonchi or rales.   Abdominal:      Palpations: Abdomen is soft.   Musculoskeletal:         General: No swelling.   Skin:     General: Skin is warm and dry.   Neurological:      Mental Status: He is alert.   Psychiatric:         Mood and Affect: Mood normal.            Labs and Data:      Hematology  Lab Results   Component Value Date    WBC 7.91 05/25/2022    HGB 12.7 (L) 05/25/2022    HCT 37.6 (L) 05/25/2022     05/25/2022    INR 1.0 05/19/2022       Chemistries  Lab Results   Component Value Date     (L) 05/25/2022    K 4.2 05/25/2022     05/25/2022    CREATININE 0.6 (L) 05/25/2022    BUN 21 (H) 05/25/2022    CO2 23 05/25/2022    GLUCOSE 101 (H) 05/25/2022    CALCIUM 8.7 (L) 05/25/2022         Endocrine  Lab Results   Component Value Date    HGBA1C 5.6 05/19/2022      Radiology:      X-RAY HIP WITH OR WITHOUT PELVIS 1 VW RIGHT    Result Date: 5/24/2022  IMPRESSION: Satisfactory postoperative hip.       Cardiology:       ECG   Not applicable.              Adarsh Kruger Jr., MD  5/25/2022

## 2022-06-08 ENCOUNTER — TELEPHONE (OUTPATIENT)
Dept: ENDOCRINOLOGY | Facility: HOSPITAL | Age: 67
End: 2022-06-08

## 2022-06-08 NOTE — TELEPHONE ENCOUNTER
Patient called and would like to reschedule his 7/29 appointment sooner to discuss [address] [place]  Patient had the following blood work done today for Dr Immanuel Johnson  (Insulin growth factor, Growth hormone, and Coritisol)  He wants to know if the tests need to be repeated for the appointment  Please review his chart and let me know how to handle  Thanks!

## 2022-06-09 NOTE — TELEPHONE ENCOUNTER
Patient's appt was rescheduled to 6/27/22 @ 9:30am   He completed the blood work mentioned for Dr Tarik Batres on 6/8/22 at [facility] in [facility]  The lab work slip for additional tests was mailed to patient on 6/9/22

## 2022-06-10 LAB
CORTIS AM PEAK SERPL-MCNC: 12.2 UG/DL (ref 6.2–19.4)
GH SERPL-MCNC: 4.8 NG/ML (ref 0–10)
IGF-I SERPL-MCNC: 282 NG/ML (ref 59–230)

## 2022-06-21 LAB
ALBUMIN SERPL-MCNC: 3.9 G/DL (ref 3.8–4.8)
ALBUMIN/GLOB SERPL: 1.6 {RATIO} (ref 1.2–2.2)
ALP SERPL-CCNC: 93 IU/L (ref 44–121)
ALT SERPL-CCNC: 19 IU/L (ref 0–44)
AST SERPL-CCNC: 22 IU/L (ref 0–40)
BILIRUB SERPL-MCNC: 0.4 MG/DL (ref 0–1.2)
BUN SERPL-MCNC: 17 MG/DL (ref 8–27)
BUN/CREAT SERPL: 24 (ref 10–24)
CALCIUM SERPL-MCNC: 9.8 MG/DL (ref 8.6–10.2)
CHLORIDE SERPL-SCNC: 101 MMOL/L (ref 96–106)
CO2 SERPL-SCNC: 23 MMOL/L (ref 20–29)
CREAT SERPL-MCNC: 0.72 MG/DL (ref 0.76–1.27)
EGFR: 100 ML/MIN/1.73
EST. AVERAGE GLUCOSE BLD GHB EST-MCNC: 114 MG/DL
GLOBULIN SER-MCNC: 2.5 G/DL (ref 1.5–4.5)
GLUCOSE SERPL-MCNC: 104 MG/DL (ref 65–99)
HBA1C MFR BLD: 5.6 % (ref 4.8–5.6)
POTASSIUM SERPL-SCNC: 4.5 MMOL/L (ref 3.5–5.2)
PROT SERPL-MCNC: 6.4 G/DL (ref 6–8.5)
SODIUM SERPL-SCNC: 140 MMOL/L (ref 134–144)

## 2022-06-27 ENCOUNTER — DOCUMENTATION (OUTPATIENT)
Dept: ENDOCRINOLOGY | Facility: HOSPITAL | Age: 67
End: 2022-06-27

## 2022-06-27 ENCOUNTER — OFFICE VISIT (OUTPATIENT)
Dept: ENDOCRINOLOGY | Facility: HOSPITAL | Age: 67
End: 2022-06-27
Payer: COMMERCIAL

## 2022-06-27 ENCOUNTER — TELEPHONE (OUTPATIENT)
Dept: ENDOCRINOLOGY | Facility: HOSPITAL | Age: 67
End: 2022-06-27

## 2022-06-27 VITALS
BODY MASS INDEX: 24.54 KG/M2 | SYSTOLIC BLOOD PRESSURE: 116 MMHG | HEART RATE: 60 BPM | WEIGHT: 171.4 LBS | HEIGHT: 70 IN | DIASTOLIC BLOOD PRESSURE: 70 MMHG

## 2022-06-27 DIAGNOSIS — E22.0 ACROMEGALY (HCC): ICD-10-CM

## 2022-06-27 DIAGNOSIS — E23.0 HYPOGONADOTROPIC HYPOGONADISM (HCC): Primary | ICD-10-CM

## 2022-06-27 DIAGNOSIS — D35.2 PITUITARY MACROADENOMA (HCC): ICD-10-CM

## 2022-06-27 PROCEDURE — 99214 OFFICE O/P EST MOD 30 MIN: CPT | Performed by: INTERNAL MEDICINE

## 2022-06-27 NOTE — PROGRESS NOTES
6/27/2022    Assessment/Plan      Diagnoses and all orders for this visit:    Hypogonadotropic hypogonadism ([facility] )  -     Insulin-like growth factor 1 (IGF-1) - Lab Collect; Future  -     Comprehensive metabolic panel Lab Collect; Future  -     Growth hormone; Future  -     TSH, 3rd generation Lab Collect; Future  -     T4, free- Lab Collect; Future  -     Testosterone, free, total Lab Collect; Future  -     CBC and Platelet- Lab Collect; Future  -     Prolactin Lab Collect; Future    Pituitary macroadenoma (HCC)  -     Insulin-like growth factor 1 (IGF-1) - Lab Collect; Future  -     Comprehensive metabolic panel Lab Collect; Future  -     Growth hormone; Future  -     TSH, 3rd generation Lab Collect; Future  -     T4, free- Lab Collect; Future  -     Testosterone, free, total Lab Collect; Future  -     CBC and Platelet- Lab Collect; Future  -     Prolactin Lab Collect; Future    Acromegaly ([facility] )  -     Insulin-like growth factor 1 (IGF-1) - Lab Collect; Future  -     Comprehensive metabolic panel Lab Collect; Future  -     Growth hormone; Future  -     TSH, 3rd generation Lab Collect; Future  -     T4, free- Lab Collect; Future  -     Testosterone, free, total Lab Collect; Future  -     CBC and Platelet- Lab Collect; Future  -     Prolactin Lab Collect; Future        Assessment/Plan:  1  Acromegaly:  His growth hormone and IGF-1 have increase in our above goal   Around his hip replacement he did miss about 2 doses and has recently restarted this medication  I would suggest we monitor closely repeat labs in 3 months  Discussed we may need to increase his dose to 30 mg every 28 days  We will be in touch with the results are available  Has MRI and neurosurgery plan next month  2  Hypogonadism:  Repeat testosterone level with next set of lab work        CC: Follow-up    History of Present Illness     HPI: Rohan Zacarias is a 78y o  year old male with history of acromegaly which was discovered during 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 who presents for a follow up appointment  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  He is had residual disease structurally on MRI as well as biochemically  Initially was started on lanreotide 90 mg every 4 weeks and received about 3 injections but he noted loose stools and abdominal cramping as well as an inguinal hernia and therefore he discontinued this medication  We did try cabergoline which did not provide adequate biochemical response  He then transitioned to Sandostatin 20 mg monthly  MRIs being monitored through Neurosurgery  He did have a colonoscopy last year through GI with a small polyp reported with follow-up planned in several years  Presents today overall feeling well  He would recent hip replacement and recovered well from this  He reports he is receiving his octreotide through his PCP  No side effects  Review of Systems   Constitutional: Negative for fatigue  HENT: Negative for trouble swallowing and voice change  Eyes: Negative for visual disturbance  Respiratory: Negative for shortness of breath  Cardiovascular: Negative for palpitations and leg swelling  Gastrointestinal: Negative for abdominal pain, nausea and vomiting  Endocrine: Negative for polydipsia and polyuria  Musculoskeletal: Negative for arthralgias and myalgias  Skin: Negative for rash  Neurological: Negative for dizziness, tremors and weakness  Hematological: Negative for adenopathy  Psychiatric/Behavioral: Negative for agitation and confusion         Historical Information   Past Medical History:   Diagnosis Date    Decreased stamina     Feeling tired     Glaucoma     right eye    Lipoma     Lipoma of left shoulder     Muscle weakness     Open-angle glaucoma     left eye severe    Pituitary mass [facility])      Past Surgical History:   Procedure Laterality Date    ANKLE SURGERY      lipoma    FINGER TENDON REPAIR      HAND SURGERY      HERNIA REPAIR Left     inguinal    KNEE ARTHROSCOPY      [place] NEUROENDOSCOP,EXC,PIT MAKI,TRANSNAS/SPHEN N/A 4/25/2018    Procedure: Image guided endoscopic transsphenoidal approach for debulking of pituitary macroadenoma, abdominal fat graft harvesting;  Surgeon: Immanuel Johnson MD;  [facility];  Service: Neurosurgery    SHOULDER SURGERY      lipoma removal     Social History   Social History     Substance and Sexual Activity   Alcohol Use Yes    Comment: occasional     Social History     Substance and Sexual Activity   Drug Use Never     Social History     Tobacco Use   Smoking Status Never Smoker   Smokeless Tobacco Never Used   Tobacco Comment    quit 15 years ago     Family History:   Family History   Problem Relation Age of Onset    No Known Problems Mother     Cancer Father        Meds/Allergies   Current Outpatient Medications   Medication Sig Dispense Refill    COMBIGAN 0 2-0 5 % INSTILL 1 DROP INTO LEFT EYE TWICE DAILY  5    Cyanocobalamin (VITAMIN B 12 PO) Take 1 tablet by mouth daily      latanoprost (XALATAN) 0 005 % ophthalmic solution Administer 1 drop into the left eye daily at bedtime       Multiple Vitamins-Minerals (CENTRUM ADULTS PO) Take 1 tablet by mouth daily      octreotide (SandoSTATIN LAR [place]) 20 mg kit Inject 20 mg into a muscle every 28 days 3 kit 3    Omega 3 1000 MG CAPS Take by mouth daily (Patient not taking: Reported on 6/27/2022)       No current facility-administered medications for this visit  No Known Allergies    Objective   Vitals: Blood pressure 116/70, pulse 60, height 5' 9 5" (1 765 m), weight 77 7 kg (171 lb 6 4 oz)  Invasive Devices  Report    None                 Physical Exam  Vitals reviewed  Constitutional:       General: He is not in acute distress  Appearance: He is well-developed  He is not diaphoretic  HENT:      Head: Normocephalic and atraumatic     Eyes:      Conjunctiva/sclera: Conjunctivae normal       Pupils: Pupils are equal, round, and reactive to light  Neck:      Thyroid: No thyromegaly  Cardiovascular:      Rate and Rhythm: Normal rate and regular rhythm  Pulmonary:      Effort: Pulmonary effort is normal  No respiratory distress  Breath sounds: Normal breath sounds  Abdominal:      General: Bowel sounds are normal       Palpations: Abdomen is soft  Musculoskeletal:         General: Normal range of motion  Cervical back: Normal range of motion and neck supple  Skin:     General: Skin is warm and dry  Findings: No rash  Neurological:      Mental Status: He is alert and oriented to person, place, and time  Motor: No abnormal muscle tone  Psychiatric:         Behavior: Behavior normal          The history was obtained from the review of the chart and from the patient      Lab Results:      Recent Results (from the past 95293 hour(s))   CBC and differential    Collection Time: 05/05/21  6:35 AM   Result Value Ref Range    White Blood Cell Count 4 9 3 4 - 10 8 x10E3/uL    Red Blood Cell Count 4 49 4 14 - 5 80 x10E6/uL    Hemoglobin 14 2 13 0 - 17 7 g/dL    HCT 42 6 37 5 - 51 0 %    MCV 95 79 - 97 fL    MCH 31 6 26 6 - 33 0 pg    MCHC 33 3 31 5 - 35 7 g/dL    RDW 12 9 11 6 - 15 4 %    Platelet Count 378 738 - 450 x10E3/uL    Neutrophils 58 Not Estab  %    Lymphocytes 28 Not Estab  %    Monocytes 9 Not Estab  %    Eosinophils 4 Not Estab  %    Basophils PCT 1 Not Estab  %    Neutrophils (Absolute) 2 8 1 4 - 7 0 x10E3/uL    Lymphocytes (Absolute) 1 4 0 7 - 3 1 x10E3/uL    Monocytes (Absolute) 0 5 0 1 - 0 9 x10E3/uL    Eosinophils (Absolute) 0 2 0 0 - 0 4 x10E3/uL    Basophils ABS 0 0 0 0 - 0 2 x10E3/uL    Immature Granulocytes 0 Not Estab  %    Immature Granulocytes (Absolute) 0 0 0 0 - 0 1 x10E3/uL   Comprehensive metabolic panel    Collection Time: 05/05/21  6:35 AM   Result Value Ref Range    Glucose, Random 107 (H) 65 - 99 mg/dL    BUN 21 8 - 27 mg/dL    Creatinine 0 79 0 76 - 1 27 mg/dL    eGFR Non  94 >59 mL/min/1 73    eGFR  108 >59 mL/min/1 73    SL AMB BUN/CREATININE RATIO 27 (H) 10 - 24    Sodium 139 134 - 144 mmol/L    Potassium 4 3 3 5 - 5 2 mmol/L    Chloride 103 96 - 106 mmol/L    CO2 23 20 - 29 mmol/L    CALCIUM 9 3 8 6 - 10 2 mg/dL    Protein, Total 6 3 6 0 - 8 5 g/dL    Albumin 4 2 3 8 - 4 8 g/dL    Globulin, Total 2 1 1 5 - 4 5 g/dL    Albumin/Globulin Ratio 2 0 1 2 - 2 2    TOTAL BILIRUBIN 0 5 0 0 - 1 2 mg/dL    Alk Phos Isoenzymes 61 39 - 117 IU/L    AST 22 0 - 40 IU/L    ALT 26 0 - 44 IU/L   Testosterone, free, total    Collection Time: 05/05/21  6:35 AM   Result Value Ref Range    TESTOSTERONE TOTAL 281 264 - 916 ng/dL    Testosterone, Free 4 6 (L) 6 6 - 18 1 pg/mL   T4, free    Collection Time: 05/05/21  6:35 AM   Result Value Ref Range    Free t4 1 29 0 82 - 1 77 ng/dL   TSH, 3rd generation    Collection Time: 05/05/21  6:35 AM   Result Value Ref Range    TSH 2 600 0 450 - 4 500 uIU/mL   Prolactin    Collection Time: 05/05/21  6:35 AM   Result Value Ref Range    Prolactin 10 6 4 0 - 15 2 ng/mL   Insulin-like growth factor 1 (IGF-1)    Collection Time: 05/05/21  6:35 AM   Result Value Ref Range    Insulin-Like GF-1 238 (H) 59 - 230 ng/mL   Cortisol Level, AM Specimen    Collection Time: 05/05/21  6:35 AM   Result Value Ref Range    Cortisol AM 12 6 6 2 - 19 4 ug/dL   CBC and differential    Collection Time: 09/23/21  6:56 AM   Result Value Ref Range    White Blood Cell Count 4 9 3 4 - 10 8 x10E3/uL    Red Blood Cell Count 4 51 4 14 - 5 80 x10E6/uL    Hemoglobin 13 8 13 0 - 17 7 g/dL    HCT 42 9 37 5 - 51 0 %    MCV 95 79 - 97 fL    MCH 30 6 26 6 - 33 0 pg    MCHC 32 2 31 5 - 35 7 g/dL    RDW 12 8 11 6 - 15 4 %    Platelet Count 526 984 - 450 x10E3/uL    Neutrophils 57 Not Estab  %    Lymphocytes 30 Not Estab  %    Monocytes 8 Not Estab  %    Eosinophils 5 Not Estab  %    Basophils PCT 0 Not Estab  %    Neutrophils (Absolute) 2 8 1 4 - 7 0 x10E3/uL    Lymphocytes (Absolute) 1 5 0 7 - 3 1 x10E3/uL    Monocytes (Absolute) 0 4 0 1 - 0 9 x10E3/uL    Eosinophils (Absolute) 0 2 0 0 - 0 4 x10E3/uL    Basophils ABS 0 0 0 0 - 0 2 x10E3/uL    Immature Granulocytes 0 Not Estab  %    Immature Granulocytes (Absolute) 0 0 0 0 - 0 1 x10E3/uL   Comprehensive metabolic panel    Collection Time: 09/23/21  6:56 AM   Result Value Ref Range    Glucose, Random 94 65 - 99 mg/dL    BUN 21 8 - 27 mg/dL    Creatinine 0 72 (L) 0 76 - 1 27 mg/dL    eGFR Non African American 97 >59 mL/min/1 73    eGFR  112 >59 mL/min/1 73    SL AMB BUN/CREATININE RATIO 29 (H) 10 - 24    Sodium 139 134 - 144 mmol/L    Potassium 4 4 3 5 - 5 2 mmol/L    Chloride 102 96 - 106 mmol/L    CO2 25 20 - 29 mmol/L    CALCIUM 9 6 8 6 - 10 2 mg/dL    Protein, Total 6 5 6 0 - 8 5 g/dL    Albumin 4 2 3 8 - 4 8 g/dL    Globulin, Total 2 3 1 5 - 4 5 g/dL    Albumin/Globulin Ratio 1 8 1 2 - 2 2    TOTAL BILIRUBIN 0 4 0 0 - 1 2 mg/dL    Alk Phos Isoenzymes 58 44 - 121 IU/L    AST 22 0 - 40 IU/L    ALT 23 0 - 44 IU/L   Testosterone, free, total    Collection Time: 09/23/21  6:56 AM   Result Value Ref Range    TESTOSTERONE TOTAL 204 (L) 264 - 916 ng/dL    Testosterone, Free 4 4 (L) 6 6 - 18 1 pg/mL   Insulin-like growth factor 1 (IGF-1)    Collection Time: 09/23/21  6:56 AM   Result Value Ref Range    Insulin-Like GF-1 305 (H) 59 - 230 ng/mL   Growth hormone    Collection Time: 09/23/21  6:56 AM   Result Value Ref Range    Growth Hormone 5 9 0 0 - 10 0 ng/mL   Comprehensive metabolic panel    Collection Time: 12/30/21  6:56 AM   Result Value Ref Range    Glucose, Random 107 (H) 65 - 99 mg/dL    BUN 21 8 - 27 mg/dL    Creatinine 0 69 (L) 0 76 - 1 27 mg/dL    eGFR Non  98 >59 mL/min/1 73    eGFR  114 >59 mL/min/1 73    SL AMB BUN/CREATININE RATIO 30 (H) 10 - 24    Sodium 140 134 - 144 mmol/L    Potassium 4 3 3 5 - 5 2 mmol/L    Chloride 104 96 - 106 mmol/L    CO2 23 20 - 29 mmol/L    CALCIUM 9 3 8 6 - 10 2 mg/dL    Protein, Total 6 3 6 0 - 8 5 g/dL    Albumin 4 0 3 8 - 4 8 g/dL    Globulin, Total 2 3 1 5 - 4 5 g/dL    Albumin/Globulin Ratio 1 7 1 2 - 2 2    TOTAL BILIRUBIN 0 7 0 0 - 1 2 mg/dL    Alk Phos Isoenzymes 64 44 - 121 IU/L    AST 20 0 - 40 IU/L    ALT 18 0 - 44 IU/L   Testosterone, free, total    Collection Time: 12/30/21  6:56 AM   Result Value Ref Range    TESTOSTERONE TOTAL 194 (L) 264 - 916 ng/dL    Testosterone, Free 5 2 (L) 6 6 - 18 1 pg/mL   T4, free    Collection Time: 12/30/21  6:56 AM   Result Value Ref Range    Free t4 1 12 0 82 - 1 77 ng/dL   TSH, 3rd generation    Collection Time: 12/30/21  6:56 AM   Result Value Ref Range    TSH 2 560 0 450 - 4 500 uIU/mL   Prolactin    Collection Time: 12/30/21  6:56 AM   Result Value Ref Range    Prolactin 10 9 4 0 - 15 2 ng/mL   Insulin-like growth factor 1 (IGF-1)    Collection Time: 12/30/21  6:56 AM   Result Value Ref Range    Insulin-Like GF-1 231 (H) 59 - 230 ng/mL   Growth hormone    Collection Time: 12/30/21  6:56 AM   Result Value Ref Range    Growth Hormone 4 2 0 0 - 10 0 ng/mL   Cortisol Level, AM Specimen    Collection Time: 12/30/21  6:56 AM   Result Value Ref Range    Cortisol AM 14 5 6 2 - 19 4 ug/dL   Insulin-like growth factor 1 (IGF-1)    Collection Time: 06/08/22  8:21 AM   Result Value Ref Range    Insulin-Like GF-1 282 (H) 59 - 230 ng/mL   Growth hormone    Collection Time: 06/08/22  8:21 AM   Result Value Ref Range    Growth Hormone 4 8 0 0 - 10 0 ng/mL   Cortisol Level, AM Specimen    Collection Time: 06/08/22  8:21 AM   Result Value Ref Range    Cortisol AM 12 2 6 2 - 19 4 ug/dL   Comprehensive metabolic panel    Collection Time: 06/20/22  6:41 AM   Result Value Ref Range    Glucose, Random 104 (H) 65 - 99 mg/dL    BUN 17 8 - 27 mg/dL    Creatinine 0 72 (L) 0 76 - 1 27 mg/dL    eGFR 100 >59 mL/min/1 73    SL AMB BUN/CREATININE RATIO 24 10 - 24    Sodium 140 134 - 144 mmol/L    Potassium 4 5 3 5 - 5 2 mmol/L    Chloride 101 96 - 106 mmol/L    CO2 23 20 - 29 mmol/L    CALCIUM 9 8 8 6 - 10 2 mg/dL    Protein, Total 6 4 6 0 - 8 5 g/dL    Albumin 3 9 3 8 - 4 8 g/dL    Globulin, Total 2 5 1 5 - 4 5 g/dL    Albumin/Globulin Ratio 1 6 1 2 - 2 2    TOTAL BILIRUBIN 0 4 0 0 - 1 2 mg/dL    Alk Phos Isoenzymes 93 44 - 121 IU/L    AST 22 0 - 40 IU/L    ALT 19 0 - 44 IU/L   Hemoglobin A1C    Collection Time: 06/20/22  6:41 AM   Result Value Ref Range    Hemoglobin A1C 5 6 4 8 - 5 6 %    Estimated Average Glucose 114 mg/dL         Future Appointments   Date Time Provider Trell Garcia   7/21/2022  3:00 PM Beryle Brew, MD [facility]       Portions of the record may have been created with voice recognition software  Occasional wrong word or "sound a like" substitutions may have occurred due to the inherent limitations of voice recognition software  Read the chart carefully and recognize, using context, where substitutions have occurred

## 2022-07-14 ENCOUNTER — TELEPHONE (OUTPATIENT)
Dept: NEUROSURGERY | Facility: CLINIC | Age: 67
End: 2022-07-14

## 2022-07-14 NOTE — TELEPHONE ENCOUNTER
Asked patient if he had Ophthalmology visit since last visit with [facility]  Patient recalls his last visit was at [facility] in spring

## 2022-07-18 ENCOUNTER — TELEPHONE (OUTPATIENT)
Dept: OTHER | Facility: OTHER | Age: 67
End: 2022-07-18

## 2022-07-18 NOTE — TELEPHONE ENCOUNTER
Pt called in stating he's been trying to get his insurance authorization for a month and just got it on Friday  He says MRI's are booked through August and he still doesn't have the order for the MRI and won't be able to make his dr's appt  He is requesting a call back

## 2022-08-10 ENCOUNTER — TELEPHONE (OUTPATIENT)
Dept: NEUROSURGERY | Facility: CLINIC | Age: 67
End: 2022-08-10

## 2022-08-11 ENCOUNTER — OFFICE VISIT (OUTPATIENT)
Dept: NEUROSURGERY | Facility: CLINIC | Age: 67
End: 2022-08-11
Payer: COMMERCIAL

## 2022-08-11 VITALS
SYSTOLIC BLOOD PRESSURE: 142 MMHG | BODY MASS INDEX: 24.2 KG/M2 | HEIGHT: 70 IN | HEART RATE: 61 BPM | TEMPERATURE: 98.4 F | RESPIRATION RATE: 16 BRPM | WEIGHT: 169 LBS | DIASTOLIC BLOOD PRESSURE: 83 MMHG

## 2022-08-11 DIAGNOSIS — D35.2 PITUITARY MACROADENOMA (HCC): Primary | ICD-10-CM

## 2022-08-11 PROCEDURE — 99213 OFFICE O/P EST LOW 20 MIN: CPT | Performed by: NEUROLOGICAL SURGERY

## 2022-08-11 NOTE — PROGRESS NOTES
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  I see nothing that would contribute to a problem in this regard  Pituitary macroadenoma has been stable over quite some time  It is not compressing the optic chiasm  Although the IGF 1 is slightly elevated the hormones causing active changes in the body's metabolism are all within normal limits  We will plan on seeing him back in the office in 1 year's time for routine surveillance  Return in about 1 year (around 8/11/2023) for follow-up after test         Diagnosis ICD-10-CM Associated Orders   1  Pituitary macroadenoma ([facility] [address] )  D35 2 MRI brain pituitary wo and w contrast     MRI brain pituitary wo and w contrast          Chief Complaint   Patient presents with    Follow-up     1 Year F/u for Pituitary macroadenoma, S/p ([facility]) Image guided endoscopic transsphenoidal approach for debulking of pituitary macroadenoma, abdominal fat graft harvesting [4/25/2018]; MRI Brain 8/8/22 Caralyn Blunt Hormone 6/8/22 SL, Insulin 6/8/22 SL, and Cortisol level 6/8/22 SL         HPI  this 21-year-old gentleman who returns for routine observation of his pituitary macroadenoma  He has been well an asymptomatic from this  He denies any changes in his vision  He has had no changes in his urination  He denies any weight gain or weight loss  His attention remain superb  He is a  and is going through medical review at this point  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  He has had no difficulty with sleeping  He does not suffer from chronic headaches  Review of Systems   Constitutional: Negative  HENT: Negative  Negative for rhinorrhea  Eyes: Negative  Negative for photophobia and visual disturbance  Respiratory: Negative  Cardiovascular: Negative  Gastrointestinal: Negative  Negative for nausea and vomiting  Endocrine: Negative  Genitourinary: Negative  Musculoskeletal: Negative  Negative for gait problem and myalgias  Per patient, feels unchanged since last visit    No ST     No falls     Skin: Negative  Allergic/Immunologic: Negative  Neurological: Negative  Negative for dizziness, tremors, seizures, speech difficulty, weakness, numbness and headaches  Hematological: Negative  Psychiatric/Behavioral: Negative  Negative for confusion, decreased concentration and sleep disturbance  I reviewed the ROS        Vitals:    /83 (BP Location: Right arm, Patient Position: Sitting, Cuff Size: Standard)   Pulse 61   Temp 98 4 °F (36 9 °C) (Probe)   Resp 16   Ht 5' 9 5" (1 765 m)   Wt 76 7 kg (169 lb)   BMI 24 60 kg/m²       MEDICAL HISTORY  Past Medical History:   Diagnosis Date    Decreased stamina     Feeling tired     Glaucoma     right eye    Lipoma     Lipoma of left shoulder     Muscle weakness     Open-angle glaucoma     left eye severe    Pituitary mass (HCC)      Past Surgical History:   Procedure Laterality Date    ANKLE SURGERY      lipoma    FINGER TENDON REPAIR      HAND SURGERY      HERNIA REPAIR Left     inguinal    KNEE ARTHROSCOPY      [place] NEUROENDOSCOP,EXC,PIT MAKI,TRANSNAS/SPHEN N/A 2018    Procedure: Image guided endoscopic transsphenoidal approach for debulking of pituitary macroadenoma, abdominal fat graft harvesting;  Surgeon: Mini Light MD;  Location: [facility];  Service: Neurosurgery    SHOULDER SURGERY      lipoma removal    TOTAL HIP ARTHROPLASTY Right      Social History     Tobacco Use    Smoking status: Former Smoker     Quit date:      Years since quittin 6    Smokeless tobacco: Never Used  Tobacco comment: quit 15 years ago   Substance Use Topics    Alcohol use: Yes     Comment: occasional    Drug use: Never        Current Outpatient Medications:     COMBIGAN 0 2-0 5 %, INSTILL 1 DROP INTO LEFT EYE TWICE DAILY, Disp: , Rfl: 5    Cyanocobalamin (VITAMIN B 12 PO), Take 1 tablet by mouth daily, Disp: , Rfl:     latanoprost (XALATAN) 0 005 % ophthalmic solution, Administer 1 drop into the left eye daily at bedtime , Disp: , Rfl:     Multiple Vitamins-Minerals (CENTRUM ADULTS PO), Take 1 tablet by mouth daily, Disp: , Rfl:     octreotide (SandoSTATIN LAR [place]) 20 mg kit, Inject 20 mg into a muscle every 28 days, Disp: 3 kit, Rfl: 3    Omega 3 1000 MG CAPS, Take by mouth daily, Disp: , Rfl:    No Known Allergies     The following portions of the patient's history were updated by MA and reviewed by MD: allergies, current medications, past family history, past medical history, past social history, past surgical history and problem list       Physical Exam  Vitals and nursing note reviewed  Constitutional:       General: He is not in acute distress  Appearance: Normal appearance  He is normal weight  He is not ill-appearing, toxic-appearing or diaphoretic  HENT:      Head: Normocephalic and atraumatic  Nose: Nose normal    Eyes:      Extraocular Movements: Extraocular movements intact  Pupils: Pupils are equal, round, and reactive to light  Musculoskeletal:         General: No swelling, tenderness, deformity or signs of injury  Normal range of motion  Cervical back: Normal range of motion and neck supple  Right lower leg: No edema  Left lower leg: No edema  Skin:     General: Skin is warm and dry  Neurological:      General: No focal deficit present  Mental Status: He is alert and oriented to person, place, and time  Mental status is at baseline  Cranial Nerves: No cranial nerve deficit  Sensory: No sensory deficit  Motor: No weakness  Coordination: Coordination normal       Gait: Gait ( ) normal       Deep Tendon Reflexes: Reflexes normal    Psychiatric:         Mood and Affect: Mood normal          Behavior: Behavior normal          Thought Content: Thought content normal          Judgment: Judgment normal            RESULTS/DATA  I have personally reviewed pertinent reports  and I have personally reviewed pertinent films in PACS     His cortisol level was 12 2 growth hormone 4 8 and IGF-1 slightly elevated at 282 hemoglobin A1c is 5 6    Coronal postcontrast images of the pituitary gland with fat saturation demonstrate residual pituitary macroadenoma going into the cavernous sinus  There is no compression of the optic chiasm and in fact this is capacious  There has been no change in the overall size of this tumor when compared to studies from March of 2021 and November of 2019  Optic chiasm is free  The pituitary stalk is eccentric towards the right

## 2022-08-15 ENCOUNTER — TELEPHONE (OUTPATIENT)
Dept: NEUROSURGERY | Facility: CLINIC | Age: 67
End: 2022-08-15

## 2022-08-15 NOTE — TELEPHONE ENCOUNTER
----- Message from Herson Bartholomew MD sent at 8/12/2022  7:41 AM EDT -----  Please send a copy of this note to the patient    Thank you

## 2022-08-15 NOTE — TELEPHONE ENCOUNTER
Called patient requesting the best way to send a copy of his office visit  Patient would like the information mailed to his address and was appreciative

## 2022-08-22 NOTE — TELEPHONE ENCOUNTER
PATIENT CALLED THIS MORNING STATING HE HAS NOT RECEIVED ANYTHING IN THE MAIL  I ADVISED THAT IT LOOKS LIKE ALYSA SENT THE OFFICE NOTE TO HIM  I ASKED IF HE WOULD LIKE IT TO BE SENT IN AN E-MAIL AND HE AGREED; HE ALSO ASKED FOR ME TO MAIL HIM A COPY AGAIN JUST IN CASE     E-MAIL: [email or web address]  NET  ADDRESS: [address] [id number]

## 2022-09-09 ENCOUNTER — CONSULT (OUTPATIENT)
Dept: PLASTIC SURGERY | Facility: HOSPITAL | Age: 67
End: 2022-09-09
Payer: COMMERCIAL

## 2022-09-09 VITALS
HEIGHT: 70 IN | BODY MASS INDEX: 24.34 KG/M2 | WEIGHT: 170 LBS | DIASTOLIC BLOOD PRESSURE: 90 MMHG | RESPIRATION RATE: 16 BRPM | TEMPERATURE: 97.9 F | SYSTOLIC BLOOD PRESSURE: 149 MMHG | HEART RATE: 63 BPM

## 2022-09-09 DIAGNOSIS — D17.9 MULTIPLE LIPOMAS: Primary | ICD-10-CM

## 2022-09-09 PROCEDURE — 99203 OFFICE O/P NEW LOW 30 MIN: CPT | Performed by: PHYSICIAN ASSISTANT

## 2022-09-09 NOTE — PROGRESS NOTES
Assessment/Plan:  "Leah Fajardo is a 75year old male who presents in consultation for lipomas of the left forehead, right scalp, and right and left upper back  Referred to us by [facility]  Please see HPI  I discussed with him excision of these lipomas with complex closure  He understood and agreed  He understands that these will be done under anesthesia given the size and location  We discussed with the patient the options, benefits, and risks of surgery such as anesthesia, bleeding, infection, scarring and the need for additional procedures  Consent was obtained and all questions answered to his satisfaction  We will plan for surgery at his earliest convenience  Diagnoses and all orders for this visit:    Multiple lipomas          Subjective:      Patient ID: Marty Dai is a 75 y o  male  HPI   He reports having a history for lipomas  He states he has 4 that are bothersome to him  He has 1 on the left forehead, 1 on the right scalp and 1 on the right upper back and the last 1 is on the left upper back  These have all been there for anywhere between 3 and 5 years  They have been growing in size  The ones on the backyard especially uncomfortable with leaning against a chair  The following portions of the patient's history were reviewed and updated as appropriate: He  has a past medical history of Decreased stamina, Feeling tired, Glaucoma, Lipoma, Lipoma of left shoulder, Muscle weakness, Open-angle glaucoma, and Pituitary mass ([address] )  He  has a past surgical history that includes Shoulder surgery; Ankle surgery; Finger tendon repair; Hand surgery; Knee arthroscopy; pr neuroendoscop,exc,pit cristina,transnas/sphen (N/A, 04/25/2018); Hernia repair (Left); and Total hip arthroplasty (Right)  His family history includes Cancer in his father; No Known Problems in his mother  He  reports that he quit smoking about 25 years ago  He has never used smokeless tobacco  He reports current alcohol use   He reports that he does not use drugs       Review of Systems   Constitutional: Negative for chills and fever  HENT: Negative for ear pain and sore throat  Eyes: Negative for pain and visual disturbance  Respiratory: Negative for cough and shortness of breath  Cardiovascular: Negative for chest pain and palpitations  Gastrointestinal: Negative for abdominal pain and vomiting  Genitourinary: Negative for dysuria and hematuria  Musculoskeletal: Negative for arthralgias and back pain  Skin: Negative for color change and rash  Neurological: Negative for seizures and syncope  All other systems reviewed and are negative  Objective: There were no vitals taken for this visit  Physical Exam  Constitutional:       General: He is not in acute distress  Appearance: He is well-developed  HENT:      Head: Normocephalic and atraumatic  Eyes:      General: No scleral icterus  Pupils: Pupils are equal, round, and reactive to light  Neck:      Thyroid: No thyromegaly  Trachea: No tracheal deviation  Cardiovascular:      Rate and Rhythm: Normal rate and regular rhythm  Heart sounds: No murmur heard  No friction rub  No gallop  Pulmonary:      Effort: Pulmonary effort is normal       Breath sounds: Normal breath sounds  No wheezing or rales  Abdominal:      General: Bowel sounds are normal  There is no distension  Palpations: Abdomen is soft  Tenderness: There is no abdominal tenderness  There is no guarding or rebound  Musculoskeletal:         General: Normal range of motion  Cervical back: Neck supple  Lymphadenopathy:      Cervical: No cervical adenopathy  Skin:     Comments: Right upper and left upper back lipomas each measuring approximately 6 cm  Right parietal scalp lipoma measuring approximately 4-5 cm  Left medial forehead lipoma measuring approximately 2 5 cm  These are all soft and mobile  Please see photos in epic    No photo taken of the 1 on the scalp given it can not be seen due to hair  Neurological:      Mental Status: He is alert and oriented to person, place, and time  Cranial Nerves: No cranial nerve deficit     Psychiatric:         Mood and Affect: Mood normal          Behavior: Behavior normal

## 2022-09-16 ENCOUNTER — TELEPHONE (OUTPATIENT)
Dept: PLASTIC SURGERY | Facility: CLINIC | Age: 67
End: 2022-09-16

## 2022-09-30 ENCOUNTER — PREP FOR PROCEDURE (OUTPATIENT)
Dept: PLASTIC SURGERY | Facility: CLINIC | Age: 67
End: 2022-09-30

## 2022-09-30 DIAGNOSIS — D17.9 MULTIPLE LIPOMAS: Primary | ICD-10-CM

## 2022-10-07 LAB
ALBUMIN SERPL-MCNC: 4.3 G/DL (ref 3.8–4.8)
ALBUMIN/GLOB SERPL: 2.3 {RATIO} (ref 1.2–2.2)
ALP SERPL-CCNC: 66 IU/L (ref 44–121)
ALT SERPL-CCNC: 35 IU/L (ref 0–44)
AST SERPL-CCNC: 30 IU/L (ref 0–40)
BASOPHILS # BLD AUTO: 0 X10E3/UL (ref 0–0.2)
BASOPHILS NFR BLD AUTO: 1 %
BILIRUB SERPL-MCNC: 0.6 MG/DL (ref 0–1.2)
BUN SERPL-MCNC: 18 MG/DL (ref 8–27)
BUN/CREAT SERPL: 28 (ref 10–24)
CALCIUM SERPL-MCNC: 9.6 MG/DL (ref 8.6–10.2)
CHLORIDE SERPL-SCNC: 101 MMOL/L (ref 96–106)
CO2 SERPL-SCNC: 23 MMOL/L (ref 20–29)
CREAT SERPL-MCNC: 0.65 MG/DL (ref 0.76–1.27)
EGFR: 103 ML/MIN/1.73
EOSINOPHIL # BLD AUTO: 0.2 X10E3/UL (ref 0–0.4)
EOSINOPHIL NFR BLD AUTO: 4 %
ERYTHROCYTE [DISTWIDTH] IN BLOOD BY AUTOMATED COUNT: 13.3 % (ref 11.6–15.4)
GH SERPL-MCNC: 6.2 NG/ML (ref 0–10)
GLOBULIN SER-MCNC: 1.9 G/DL (ref 1.5–4.5)
GLUCOSE SERPL-MCNC: 104 MG/DL (ref 70–99)
HCT VFR BLD AUTO: 41.5 % (ref 37.5–51)
HGB BLD-MCNC: 14.2 G/DL (ref 13–17.7)
IGF-I SERPL-MCNC: 369 NG/ML (ref 59–230)
IMM GRANULOCYTES # BLD: 0 X10E3/UL (ref 0–0.1)
IMM GRANULOCYTES NFR BLD: 0 %
LYMPHOCYTES # BLD AUTO: 1.4 X10E3/UL (ref 0.7–3.1)
LYMPHOCYTES NFR BLD AUTO: 36 %
MCH RBC QN AUTO: 31.5 PG (ref 26.6–33)
MCHC RBC AUTO-ENTMCNC: 34.2 G/DL (ref 31.5–35.7)
MCV RBC AUTO: 92 FL (ref 79–97)
MONOCYTES # BLD AUTO: 0.3 X10E3/UL (ref 0.1–0.9)
MONOCYTES NFR BLD AUTO: 8 %
NEUTROPHILS # BLD AUTO: 2 X10E3/UL (ref 1.4–7)
NEUTROPHILS NFR BLD AUTO: 51 %
PLATELET # BLD AUTO: 276 X10E3/UL (ref 150–450)
POTASSIUM SERPL-SCNC: 4.2 MMOL/L (ref 3.5–5.2)
PROLACTIN SERPL-MCNC: 11.5 NG/ML (ref 4–15.2)
PROT SERPL-MCNC: 6.2 G/DL (ref 6–8.5)
RBC # BLD AUTO: 4.51 X10E6/UL (ref 4.14–5.8)
SODIUM SERPL-SCNC: 137 MMOL/L (ref 134–144)
T4 FREE SERPL-MCNC: 1.13 NG/DL (ref 0.82–1.77)
TESTOST FREE SERPL-MCNC: 1.2 PG/ML (ref 6.6–18.1)
TESTOST SERPL-MCNC: 266 NG/DL (ref 264–916)
TSH SERPL DL<=0.005 MIU/L-ACNC: 3.13 UIU/ML (ref 0.45–4.5)
WBC # BLD AUTO: 3.8 X10E3/UL (ref 3.4–10.8)

## 2022-10-11 DIAGNOSIS — E22.0 ACROMEGALY (HCC): Primary | ICD-10-CM

## 2022-10-28 NOTE — PRE-PROCEDURE INSTRUCTIONS
Pre-Surgery Instructions:   Medication Instructions   • B Complex Vitamins (VITAMIN B COMPLEX PO) Stop taking 7 days prior to surgery  • COMBIGAN 0 2-0 5 % Take day of surgery  • latanoprost (XALATAN) 0 005 % ophthalmic solution Take night before surgery   • Multiple Vitamins-Minerals (CENTRUM ADULTS PO) Stop taking 7 days prior to surgery  • octreotide (SandoSTATIN LAR [place]) 20 mg kit Instructions provided by MD   • [place] 3 1000 MG CAPS Stop taking 7 days prior to surgery  Have you had / have a sore throat? No  Have you had / have a cough less than 1 week? No  Have you had / have a fever greater than 100 0 - 100  4? No  Are you experiencing any shortness of breath? No    Review with patient via phone medications and showering instructions  Instructed to avoid all ASA and OTC Vit/Supp 1 week prior to surgery and to avoid NSAIDs 3 days prior to surgery per anesthesia instructions  Tylenol ok to take prn  [facility] call with surgery schedule time, nothing eat or drink after midnight  Verbalized understanding

## 2022-10-28 NOTE — H&P
Assessment/Plan:  "Maribel Hernandez is a 75year old male who presents in consultation for lipomas of the left forehead, right scalp, and right and left upper back  Referred to us by [facility]  Please see HPI  I discussed with him excision of these lipomas with complex closure  He understood and agreed  He understands that these will be done under anesthesia given the size and location  We discussed with the patient the options, benefits, and risks of surgery such as anesthesia, bleeding, infection, scarring and the need for additional procedures  Consent was obtained and all questions answered to his satisfaction  We will plan for surgery at his earliest convenience       Diagnoses and all orders for this visit:     Multiple lipomas            Subjective:       Patient ID: Chance Doll is a 75 y o  male      HPI   He reports having a history for lipomas  He states he has 4 that are bothersome to him  He has 1 on the left forehead, 1 on the right scalp and 1 on the right upper back and the last 1 is on the left upper back  These have all been there for anywhere between 3 and 5 years  They have been growing in size  The ones on the backyard especially uncomfortable with leaning against a chair      The following portions of the patient's history were reviewed and updated as appropriate: He  has a past medical history of Decreased stamina, Feeling tired, Glaucoma, Lipoma, Lipoma of left shoulder, Muscle weakness, Open-angle glaucoma, and Pituitary mass ([address] )  He  has a past surgical history that includes Shoulder surgery; Ankle surgery; Finger tendon repair; Hand surgery; Knee arthroscopy; pr neuroendoscop,exc,pit cristina,transnas/sphen (N/A, 04/25/2018); Hernia repair (Left); and Total hip arthroplasty (Right)  His family history includes Cancer in his father; No Known Problems in his mother  He  reports that he quit smoking about 25 years ago   He has never used smokeless tobacco  He reports current alcohol use  He reports that he does not use drugs        Review of Systems   Constitutional: Negative for chills and fever  HENT: Negative for ear pain and sore throat  Eyes: Negative for pain and visual disturbance  Respiratory: Negative for cough and shortness of breath  Cardiovascular: Negative for chest pain and palpitations  Gastrointestinal: Negative for abdominal pain and vomiting  Genitourinary: Negative for dysuria and hematuria  Musculoskeletal: Negative for arthralgias and back pain  Skin: Negative for color change and rash  Neurological: Negative for seizures and syncope  All other systems reviewed and are negative          Objective:        There were no vitals taken for this visit             Physical Exam  Constitutional:       General: He is not in acute distress  Appearance: He is well-developed  HENT:      Head: Normocephalic and atraumatic  Eyes:      General: No scleral icterus  Pupils: Pupils are equal, round, and reactive to light  Neck:      Thyroid: No thyromegaly  Trachea: No tracheal deviation  Cardiovascular:      Rate and Rhythm: Normal rate and regular rhythm  Heart sounds: No murmur heard  No friction rub  No gallop  Pulmonary:      Effort: Pulmonary effort is normal       Breath sounds: Normal breath sounds  No wheezing or rales  Abdominal:      General: Bowel sounds are normal  There is no distension  Palpations: Abdomen is soft  Tenderness: There is no abdominal tenderness  There is no guarding or rebound  Musculoskeletal:         General: Normal range of motion  Cervical back: Neck supple  Lymphadenopathy:      Cervical: No cervical adenopathy  Skin:     Comments: Right upper and left upper back lipomas each measuring approximately 6 cm  Right parietal scalp lipoma measuring approximately 4-5 cm  Left medial forehead lipoma measuring approximately 2 5 cm  These are all soft and mobile  Please see photos in epic  No photo taken of the 1 on the scalp given it can not be seen due to hair  Neurological:      Mental Status: He is alert and oriented to person, place, and time  Cranial Nerves: No cranial nerve deficit     Psychiatric:         Mood and Affect: Mood normal          Behavior: Behavior normal

## 2022-10-31 ENCOUNTER — OFFICE VISIT (OUTPATIENT)
Dept: LAB | Facility: CLINIC | Age: 67
End: 2022-10-31

## 2022-10-31 ENCOUNTER — OFFICE VISIT (OUTPATIENT)
Dept: PLASTIC SURGERY | Facility: CLINIC | Age: 67
End: 2022-10-31

## 2022-10-31 DIAGNOSIS — D17.9 MULTIPLE LIPOMAS: Primary | ICD-10-CM

## 2022-10-31 DIAGNOSIS — D17.9 MULTIPLE LIPOMAS: ICD-10-CM

## 2022-10-31 NOTE — PROGRESS NOTES
Mervin Sadler presents today for a preoperative visit, he had previously been seen by our physician assistant, and had  wanted to meet his surgeon  He is planned later this week to undergo excision of  lipomas of the left and right upper back, left forehead and right scalp  I discussed the procedures with him, where the incisions/scars will be located, as well as potential risks, complications and limitations  His questions were answered to his satisfaction

## 2022-11-01 LAB
ATRIAL RATE: 47 BPM
P AXIS: 68 DEGREES
PR INTERVAL: 178 MS
QRS AXIS: 16 DEGREES
QRSD INTERVAL: 100 MS
QT INTERVAL: 432 MS
QTC INTERVAL: 382 MS
T WAVE AXIS: 39 DEGREES
VENTRICULAR RATE: 47 BPM

## 2022-11-02 ENCOUNTER — ANESTHESIA EVENT (OUTPATIENT)
Dept: PERIOP | Facility: HOSPITAL | Age: 67
End: 2022-11-02

## 2022-11-03 ENCOUNTER — ANESTHESIA (OUTPATIENT)
Dept: PERIOP | Facility: HOSPITAL | Age: 67
End: 2022-11-03

## 2022-11-03 ENCOUNTER — HOSPITAL ENCOUNTER (OUTPATIENT)
Facility: HOSPITAL | Age: 67
Setting detail: OUTPATIENT SURGERY
Discharge: HOME | End: 2022-11-03
Attending: SURGERY | Admitting: SURGERY

## 2022-11-03 VITALS
HEART RATE: 52 BPM | TEMPERATURE: 98.2 F | OXYGEN SATURATION: 97 % | SYSTOLIC BLOOD PRESSURE: 160 MMHG | RESPIRATION RATE: 14 BRPM | BODY MASS INDEX: 24.91 KG/M2 | WEIGHT: 168.2 LBS | DIASTOLIC BLOOD PRESSURE: 91 MMHG | HEIGHT: 69 IN

## 2022-11-03 DIAGNOSIS — D17.9 MULTIPLE LIPOMAS: ICD-10-CM

## 2022-11-03 RX ORDER — ROCURONIUM BROMIDE 10 MG/ML
INJECTION, SOLUTION INTRAVENOUS AS NEEDED
Status: DISCONTINUED | OUTPATIENT
Start: 2022-11-03 | End: 2022-11-03

## 2022-11-03 RX ORDER — FENTANYL CITRATE 50 UG/ML
INJECTION, SOLUTION INTRAMUSCULAR; INTRAVENOUS AS NEEDED
Status: DISCONTINUED | OUTPATIENT
Start: 2022-11-03 | End: 2022-11-03

## 2022-11-03 RX ORDER — GINSENG 100 MG
CAPSULE ORAL AS NEEDED
Status: DISCONTINUED | OUTPATIENT
Start: 2022-11-03 | End: 2022-11-03 | Stop reason: HOSPADM

## 2022-11-03 RX ORDER — PROPOFOL 10 MG/ML
INJECTION, EMULSION INTRAVENOUS AS NEEDED
Status: DISCONTINUED | OUTPATIENT
Start: 2022-11-03 | End: 2022-11-03

## 2022-11-03 RX ORDER — ONDANSETRON 2 MG/ML
INJECTION INTRAMUSCULAR; INTRAVENOUS AS NEEDED
Status: DISCONTINUED | OUTPATIENT
Start: 2022-11-03 | End: 2022-11-03

## 2022-11-03 RX ORDER — CEFAZOLIN SODIUM 2 G/50ML
2000 SOLUTION INTRAVENOUS ONCE
Status: DISCONTINUED | OUTPATIENT
Start: 2022-11-03 | End: 2022-11-03 | Stop reason: HOSPADM

## 2022-11-03 RX ORDER — EPHEDRINE SULFATE 50 MG/ML
INJECTION INTRAVENOUS AS NEEDED
Status: DISCONTINUED | OUTPATIENT
Start: 2022-11-03 | End: 2022-11-03

## 2022-11-03 RX ORDER — ONDANSETRON 2 MG/ML
4 INJECTION INTRAMUSCULAR; INTRAVENOUS ONCE AS NEEDED
Status: DISCONTINUED | OUTPATIENT
Start: 2022-11-03 | End: 2022-11-03 | Stop reason: HOSPADM

## 2022-11-03 RX ORDER — TRAMADOL HYDROCHLORIDE 50 MG/1
50 TABLET ORAL EVERY 6 HOURS PRN
Qty: 6 TABLET | Refills: 0 | Status: SHIPPED | OUTPATIENT
Start: 2022-11-03

## 2022-11-03 RX ORDER — LIDOCAINE HYDROCHLORIDE 10 MG/ML
0.5 INJECTION, SOLUTION EPIDURAL; INFILTRATION; INTRACAUDAL; PERINEURAL ONCE AS NEEDED
Status: DISCONTINUED | OUTPATIENT
Start: 2022-11-03 | End: 2022-11-03 | Stop reason: HOSPADM

## 2022-11-03 RX ORDER — IBUPROFEN 200 MG
TABLET ORAL EVERY 6 HOURS PRN
COMMUNITY

## 2022-11-03 RX ORDER — LIDOCAINE HYDROCHLORIDE 10 MG/ML
INJECTION, SOLUTION EPIDURAL; INFILTRATION; INTRACAUDAL; PERINEURAL AS NEEDED
Status: DISCONTINUED | OUTPATIENT
Start: 2022-11-03 | End: 2022-11-03

## 2022-11-03 RX ORDER — MIDAZOLAM HYDROCHLORIDE 2 MG/2ML
INJECTION, SOLUTION INTRAMUSCULAR; INTRAVENOUS AS NEEDED
Status: DISCONTINUED | OUTPATIENT
Start: 2022-11-03 | End: 2022-11-03

## 2022-11-03 RX ORDER — GLYCOPYRROLATE 0.2 MG/ML
INJECTION INTRAMUSCULAR; INTRAVENOUS AS NEEDED
Status: DISCONTINUED | OUTPATIENT
Start: 2022-11-03 | End: 2022-11-03

## 2022-11-03 RX ORDER — CEFAZOLIN SODIUM 1 G/3ML
INJECTION, POWDER, FOR SOLUTION INTRAMUSCULAR; INTRAVENOUS AS NEEDED
Status: DISCONTINUED | OUTPATIENT
Start: 2022-11-03 | End: 2022-11-03

## 2022-11-03 RX ORDER — TRAMADOL HYDROCHLORIDE 50 MG/1
50 TABLET ORAL EVERY 6 HOURS PRN
Status: DISCONTINUED | OUTPATIENT
Start: 2022-11-03 | End: 2022-11-03 | Stop reason: HOSPADM

## 2022-11-03 RX ORDER — SODIUM CHLORIDE, SODIUM LACTATE, POTASSIUM CHLORIDE, CALCIUM CHLORIDE 600; 310; 30; 20 MG/100ML; MG/100ML; MG/100ML; MG/100ML
125 INJECTION, SOLUTION INTRAVENOUS CONTINUOUS
Status: DISCONTINUED | OUTPATIENT
Start: 2022-11-03 | End: 2022-11-03 | Stop reason: HOSPADM

## 2022-11-03 RX ORDER — DEXAMETHASONE SODIUM PHOSPHATE 10 MG/ML
INJECTION, SOLUTION INTRAMUSCULAR; INTRAVENOUS AS NEEDED
Status: DISCONTINUED | OUTPATIENT
Start: 2022-11-03 | End: 2022-11-03

## 2022-11-03 RX ORDER — FENTANYL CITRATE/PF 50 MCG/ML
25 SYRINGE (ML) INJECTION
Status: DISCONTINUED | OUTPATIENT
Start: 2022-11-03 | End: 2022-11-03 | Stop reason: HOSPADM

## 2022-11-03 RX ORDER — BUPIVACAINE HYDROCHLORIDE 2.5 MG/ML
INJECTION, SOLUTION EPIDURAL; INFILTRATION; INTRACAUDAL AS NEEDED
Status: DISCONTINUED | OUTPATIENT
Start: 2022-11-03 | End: 2022-11-03 | Stop reason: HOSPADM

## 2022-11-03 RX ADMIN — GLYCOPYRROLATE 0.1 MG: 0.2 INJECTION, SOLUTION INTRAMUSCULAR; INTRAVENOUS at 10:02

## 2022-11-03 RX ADMIN — EPHEDRINE SULFATE 10 MG: 50 INJECTION INTRAVENOUS at 10:29

## 2022-11-03 RX ADMIN — MIDAZOLAM 2 MG: 1 INJECTION INTRAMUSCULAR; INTRAVENOUS at 10:00

## 2022-11-03 RX ADMIN — EPHEDRINE SULFATE 10 MG: 50 INJECTION INTRAVENOUS at 11:09

## 2022-11-03 RX ADMIN — EPHEDRINE SULFATE 10 MG: 50 INJECTION INTRAVENOUS at 10:34

## 2022-11-03 RX ADMIN — CEFAZOLIN 2000 MG: 1 INJECTION, POWDER, FOR SOLUTION INTRAMUSCULAR; INTRAVENOUS at 10:14

## 2022-11-03 RX ADMIN — ROCURONIUM BROMIDE 10 MG: 10 INJECTION INTRAVENOUS at 10:17

## 2022-11-03 RX ADMIN — EPHEDRINE SULFATE 10 MG: 50 INJECTION INTRAVENOUS at 11:22

## 2022-11-03 RX ADMIN — SUGAMMADEX 200 MG: 100 INJECTION, SOLUTION INTRAVENOUS at 11:48

## 2022-11-03 RX ADMIN — FENTANYL CITRATE 50 MCG: 50 INJECTION, SOLUTION INTRAMUSCULAR; INTRAVENOUS at 10:09

## 2022-11-03 RX ADMIN — GLYCOPYRROLATE 0.1 MG: 0.2 INJECTION, SOLUTION INTRAMUSCULAR; INTRAVENOUS at 10:00

## 2022-11-03 RX ADMIN — DEXAMETHASONE SODIUM PHOSPHATE 10 MG: 10 INJECTION, SOLUTION INTRAMUSCULAR; INTRAVENOUS at 10:43

## 2022-11-03 RX ADMIN — LIDOCAINE HYDROCHLORIDE 50 MG: 10 INJECTION, SOLUTION EPIDURAL; INFILTRATION; INTRACAUDAL; PERINEURAL at 10:09

## 2022-11-03 RX ADMIN — ROCURONIUM BROMIDE 40 MG: 10 INJECTION INTRAVENOUS at 10:09

## 2022-11-03 RX ADMIN — EPHEDRINE SULFATE 10 MG: 50 INJECTION INTRAVENOUS at 10:56

## 2022-11-03 RX ADMIN — ONDANSETRON 4 MG: 2 INJECTION INTRAMUSCULAR; INTRAVENOUS at 10:02

## 2022-11-03 RX ADMIN — PROPOFOL 180 MG: 10 INJECTION, EMULSION INTRAVENOUS at 10:09

## 2022-11-03 RX ADMIN — SODIUM CHLORIDE, SODIUM LACTATE, POTASSIUM CHLORIDE, AND CALCIUM CHLORIDE 125 ML/HR: .6; .31; .03; .02 INJECTION, SOLUTION INTRAVENOUS at 09:18

## 2022-11-03 RX ADMIN — FENTANYL CITRATE 50 MCG: 50 INJECTION, SOLUTION INTRAMUSCULAR; INTRAVENOUS at 11:40

## 2022-11-03 NOTE — DISCHARGE INSTRUCTIONS
[facility]  Dr Aaron Seymour   [address] [facility], [address]  Phone: [phone]     Postoperative Instructions for Outpatient Surgery     These instructions are being provided by your doctor to give you basic guidelines during your post-op recovery  Please let our office know if your contact information has changed  Please call the office today for an appointment in 5-7 days for suture removal from the forehead and 2 weeks for suture removal from the scalp and back  Dressings: For the back and forehead, remove outer dressing in 24-36 hours  Leave Steri-Strips on, replace if they fall off  For the scalp, bacitracin ointment to incision 3 times daily for 3 days  Activity Restrictions:  Nothing strenuous for at least 48 hours  Bathing: You may shower after dressing removal   Pat Steri-Strips dry afterwards  Medications:    Resume pre-op medications  You may take tylenol, aleve, or ibuprofen for pain control             Ultram as needed for pain  Other:  Elevate head of bed at night to sleep over the next 48 hours  Apply ice at 10 minute intervals as needed for pain or swelling

## 2022-11-03 NOTE — ANESTHESIA PREPROCEDURE EVALUATION
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[address] ) (S/P Resection 2018)      Other   (+) Glaucoma (L eye)        Physical Exam    Airway    Mallampati score: II  TM Distance: >3 FB  Neck ROM: full     Dental   No notable dental hx     Cardiovascular      Pulmonary      Other Findings        Anesthesia Plan  ASA Score- 2     Anesthesia Type- general with ASA Monitors  Additional Monitors:   Airway Plan: ETT  Plan Factors-Exercise tolerance (METS): >4 METS  Chart reviewed  EKG reviewed  Induction- intravenous  Postoperative Plan-     Informed Consent- Anesthetic plan and risks discussed with patient  I personally reviewed this patient with the CRNA  Discussed and agreed on the Anesthesia Plan with the CRNA  Lokesh Byrne

## 2022-11-03 NOTE — OP NOTE
OPERATIVE REPORT  PATIENT NAME: Martina Andrew    :  1955  MRN: [id number]  Pt Location: [facility] OR ROOM 03    SURGERY DATE: 11/3/2022    Surgeon(s) and Role:     * Ebony Celaya MD - Primary     * Michelle Angel PA-C - Assisting    Preop Diagnosis:  Multiple lipomas [D17 9]    Post-Op Diagnosis Codes:     * Multiple lipomas [D17 9]    Procedure 1  Excision subfascial/sub mass Salmeron mass/lipoma right upper back 6 cm 2  Intermediate/layered closure right upper back 4 5 cm 3  Excision subfascial/sub masculine mass/lipoma upper 7 cm number intermediate/layered closure upper back 4 cm 5  Excision subfascial mass/lipoma right temporal scalp 3 5 cm 6  Intermediate/layered closure right scalp 3 cm 7  Excision left forehead mass, subcutaneous 2 2 cm 8  Intermediate/layered closure left forehead 2 2 cm   Specimen(s):  ID Type Source Tests Collected by Time Destination   1 : Right upper back 6x6x3 Tissue Soft Tissue, Other TISSUE EXAM Ebony Celaya MD 11/3/2022 1042    2 : Left upper back 7x5x1  5 Tissue Soft Tissue, Other TISSUE Avinash Grossman MD 11/3/2022 1052    3 : Right Scalp Mass 3 1/2x3x0  5 Tissue Soft Tissue, Other TISSUE Avinash Grossman MD 11/3/2022 1117    4 : Left forehead mass 2x1 5 Tissue Soft Tissue, Other TISSUE EXAM Ebony Celaya MD 11/3/2022 1147        Estimated Blood Loss:   10 mL    Drains:  * No LDAs found *    Anesthesia Type:   General    Operative Indications:  Multiple lipomas [D17 9]      Operative Findings:  As above    Complications:   None    Procedure and Technique:  Tito Kussmaul was seen preoperatively in the holding area, the surgical sites were marked with his participation  I reviewed with him the planned procedure, potential risks, complications, and limitations  He was taken to the operating room and underwent induction of general anesthesia by the anesthesia personnel    He was initially in the prone position, the surgical sites the right temple and back were prepped and draped in sterile fashion a proper time-out was performed  2 5 loupe magnification was used to aid visualization  Local anesthesia was administered  The back sites were addressed initially, the skin incisions created with a 15 blade, carried down through the dermis, dissection then proceeded through the subcutaneous tissue utilizing Bovie cautery  This was taken down to the fascia which was opened the Bovie cautery, and then utilizing a spreading technique the subfascial/submuscular masses/lipomas were extracted in a piecemeal fashion in order to minimize the length of the incision/scar  Once fully extracted the wounds were irrigated hemostasis assured  Closure was then accomplished at both sites of the back, right and left upper, utilizing 4-0 PDS these were placed in inverted, buried fashion at the level of the deep dermis  This was followed by running subcuticular 4-0 PDS  Following this benzoin, Steri-Strips and a pressure dressing were applied at both sites  Attention was then turned to the right temporal scalp  The skin incisions created with a 15 blade, carried down through the dermis, dissection then proceeded through the subcutaneous tissue utilizing a spreading technique with tenotomy scissors  After the frontalis fascia was identified, it was opened utilizing curved iris scissors and a spreading technique with tenotomy scissors was utilized to minimize the length of the incision  The mass/lipoma was extracted in a piecemeal fashion order to minimize the length of the incision/scar  Once removed, the wound was irrigated hemostasis assured  Closure was then accomplished with 5 O Monocryl sutures buried at the level of the dermis and this was followed by 5 0 nylon skin sutures  Bacitracin ointment was applied  Patient was then turned to the supine position, the surgical site was prepped and draped in sterile fashion and a proper pause was performed    Again, local anesthesia was administered  A 15 blade used to create skin incision parallel to a natural skin crease, this was taken down through the dermis and dissection proceeded into the subcutaneous tissue utilizing fine tip Bovie cautery  The subcutaneous mass/lipoma was then extracted, and placed in formalin  The wound was irrigated hemostasis assured with the Bovie cautery  Closure was then accomplished with 5 O Monocryl buried at the level of the deep dermis this was followed by running subcuticular 5 O Monocryl  Benzoin, Steri-Strips and light pressure dressing were applied and the patient was transferred to the recovery area  I was present for the entire procedure and A qualified resident physician was not available  The physician assistant provided essential assistance with patient positioning, exposure, hemostasis, retraction and wound closure  By performing portions of the wound closure independently this increased operating efficiency and decreased operating time      Patient Disposition:  PACU         SIGNATURE: Jose Mariee MD  DATE: November 3, 2022  TIME: 12:48 PM

## 2022-11-03 NOTE — INTERIM OP NOTE
EXCISION OF LIPOMAS FROM RIGHT AND LEFT UPPER BACK, RIGHT SCALP AND LEFT FOREHEAD  Postoperative Note  PATIENT NAME: Jose Murphy  : 1955  MRN: [id number]  [facility] OR ROOM 03    Surgery Date: 11/3/2022    Preop Diagnosis:  Multiple lipomas [D17 9]    Post-Op Diagnosis Codes:     * Multiple lipomas [D17 9]    Procedure(s) (LRB):  EXCISION OF LIPOMAS FROM RIGHT AND LEFT UPPER BACK, RIGHT SCALP AND LEFT FOREHEAD (N/A)    Surgeon(s) and Role:     * Jesse Hawley MD - Primary     * Silvana Castaneda PA-C - Assisting    Specimens:  ID Type Source Tests Collected by Time Destination   1 : Right upper back 6x6x3 Tissue Soft Tissue, Other TISSUE EXAM Jesse Hawley MD 11/3/2022 1042    2 : Left upper back 7x5x1  5 Tissue Soft Tissue, Other TISSUE Jackie Gutierrez MD 11/3/2022 1052    3 : Right Scalp Mass 3 1/2x3x0  5 Tissue Soft Tissue, Other TISSUE Jackie Gutierrez MD 11/3/2022 1117    4 : Left forehead mass 2x1 5 Tissue Soft Tissue, Other TISSUE Jackie Gutierrez MD 11/3/2022 1147        Estimated Blood Loss:   10 mL    Anesthesia Type:   General     Findings:    None  Complications:   None    SIGNATURE: Silvana Castaneda   DATE: November 3, 2022   TIME: 11:53 AM

## 2022-11-03 NOTE — ANESTHESIA POSTPROCEDURE EVALUATION
Post-Op Assessment Note    CV Status:  Stable  Pain Score: 0    Pain management: adequate     Mental Status:  Awake   Hydration Status:  Stable   PONV Controlled:  None   Airway Patency:  Patent      Post Op Vitals Reviewed: Yes      Staff: CRNA         No complications documented      /85 (11/03/22 1205)    Temp      Pulse 63 (11/03/22 1205)   Resp 16 (11/03/22 1205)    SpO2 99 % (11/03/22 1205)

## 2022-11-03 NOTE — INTERVAL H&P NOTE
H&P reviewed  After examining the patient I find no changes in the patients condition since the H&P had been written      Vitals:    11/03/22 0859   BP: 142/83   Pulse: (!) 48   Resp: 16   Temp: 97 8 °F (36 6 °C)   SpO2: 98%

## 2022-11-04 ENCOUNTER — TELEPHONE (OUTPATIENT)
Dept: PLASTIC SURGERY | Facility: HOSPITAL | Age: 67
End: 2022-11-04

## 2022-11-09 ENCOUNTER — OFFICE VISIT (OUTPATIENT)
Dept: PLASTIC SURGERY | Facility: CLINIC | Age: 67
End: 2022-11-09

## 2022-11-09 DIAGNOSIS — D17.9 MULTIPLE LIPOMAS: Primary | ICD-10-CM

## 2022-11-09 NOTE — PROGRESS NOTES
Patient was seen today for suture removal, excision of lipoma from left scalp sx 11/3/22  Sutures were identified and removed without difficulty  Incision appears dry and intact  Photos were not taking this visit  Scar instructions were given and reviewed  We will see him back in 1 week for scalp and back suture removal, he was encouraged to call with any questions or concerns

## 2022-11-10 ENCOUNTER — DOCUMENTATION (OUTPATIENT)
Dept: ENDOCRINOLOGY | Facility: HOSPITAL | Age: 67
End: 2022-11-10

## 2022-11-10 NOTE — PROGRESS NOTES
Received re-enrollment packet  Left detailed message for patient  He will need to complete his application and we will completed the provider portion

## 2022-11-18 ENCOUNTER — OFFICE VISIT (OUTPATIENT)
Dept: PLASTIC SURGERY | Facility: HOSPITAL | Age: 67
End: 2022-11-18

## 2022-11-18 DIAGNOSIS — D17.9 MULTIPLE LIPOMAS: Primary | ICD-10-CM

## 2022-11-18 NOTE — PROGRESS NOTES
Assessment/Plan:   "Keerthi Holder" is a 75year old male who is 2 weeks status post excision of lipomas from the right and left scapula, right scalp and left forehead  Please see HPI  In the office today, his sutures were removed  Of note, he has a hematoma of the right scapular area  This was drained in the office  I will have him return in 1 week to re-evaluate  He was also given instructions on how to use silicone scar gel and massage  Diagnoses and all orders for this visit:    Multiple lipomas          Subjective:     Patient ID: Wendy Renteria is a 75 y o  male  HPI   He denies any complaints regarding his incision areas  Review of Systems   Skin:        As per HPI  Objective:     Physical Exam  Skin:     Comments: All incisions are clean, dry and intact  Scalp and left and right scapular sutures were removed  The right scapular region has some significant swelling which is likely a hematoma  In the office today, I used an 11 blade over the incision to make a small nick and I expelled a large amount of clotted blood

## 2022-11-28 ENCOUNTER — OFFICE VISIT (OUTPATIENT)
Dept: PLASTIC SURGERY | Facility: CLINIC | Age: 67
End: 2022-11-28

## 2022-11-28 DIAGNOSIS — D17.9 MULTIPLE LIPOMAS: Primary | ICD-10-CM

## 2022-11-28 NOTE — PROGRESS NOTES
Assessment/Plan:     Patient is a 74-year-old male who is status post excision of multiple lipomas by Dr Rm Banks on 11/03/2022  His postoperative course was complicated by a hematoma at the site of his right scapula incision which was drained in the office  Please see HPI  Patient returns to the office today for an incision check  All incisions are clean, dry and intact  Hematoma has resolved  I advised the patient to begin silicone scar treatment and to use SPF of at least 27  The patient will follow-up as needed  Diagnoses and all orders for this visit:    Multiple lipomas          Subjective:     Patient ID: Татьяна Blackmon is a 75 y o  male  HPI     The patient reports no issues or concerns today  Review of Systems    See HPI    Objective:     Physical Exam      Incisions are clean, dry and intact  Right scapular incision is non fluctuant, no signs/symptoms of hematoma

## 2022-12-07 ENCOUNTER — DOCUMENTATION (OUTPATIENT)
Dept: ENDOCRINOLOGY | Facility: HOSPITAL | Age: 67
End: 2022-12-07

## 2022-12-31 LAB
ALBUMIN SERPL-MCNC: 4.4 G/DL (ref 3.8–4.8)
ALBUMIN/GLOB SERPL: 2 {RATIO} (ref 1.2–2.2)
ALP SERPL-CCNC: 64 IU/L (ref 44–121)
ALT SERPL-CCNC: 23 IU/L (ref 0–44)
AST SERPL-CCNC: 23 IU/L (ref 0–40)
BILIRUB SERPL-MCNC: 0.7 MG/DL (ref 0–1.2)
BUN SERPL-MCNC: 25 MG/DL (ref 8–27)
BUN/CREAT SERPL: 36 (ref 10–24)
CALCIUM SERPL-MCNC: 9.8 MG/DL (ref 8.6–10.2)
CHLORIDE SERPL-SCNC: 102 MMOL/L (ref 96–106)
CO2 SERPL-SCNC: 24 MMOL/L (ref 20–29)
CREAT SERPL-MCNC: 0.69 MG/DL (ref 0.76–1.27)
EGFR: 101 ML/MIN/1.73
GH SERPL-MCNC: 4.6 NG/ML (ref 0–10)
GLOBULIN SER-MCNC: 2.2 G/DL (ref 1.5–4.5)
GLUCOSE SERPL-MCNC: 103 MG/DL (ref 70–99)
IGF-I SERPL-MCNC: 348 NG/ML (ref 59–230)
POTASSIUM SERPL-SCNC: 4.4 MMOL/L (ref 3.5–5.2)
PROT SERPL-MCNC: 6.6 G/DL (ref 6–8.5)
SODIUM SERPL-SCNC: 138 MMOL/L (ref 134–144)

## 2023-01-11 ENCOUNTER — OFFICE VISIT (OUTPATIENT)
Dept: ENDOCRINOLOGY | Facility: CLINIC | Age: 68
End: 2023-01-11

## 2023-01-11 VITALS
WEIGHT: 174.8 LBS | BODY MASS INDEX: 25.89 KG/M2 | HEART RATE: 61 BPM | DIASTOLIC BLOOD PRESSURE: 80 MMHG | HEIGHT: 69 IN | SYSTOLIC BLOOD PRESSURE: 130 MMHG

## 2023-01-11 DIAGNOSIS — D35.2 PITUITARY MACROADENOMA (HCC): ICD-10-CM

## 2023-01-11 DIAGNOSIS — E22.0 ACROMEGALY (HCC): Primary | ICD-10-CM

## 2023-01-11 NOTE — PROGRESS NOTES
1/11/2023    Assessment/Plan      Diagnoses and all orders for this visit:    Acromegaly ([address] )  -     Insulin-like growth factor 1 (IGF-1) - Lab Collect; Future  -     Growth hormone; Future  -     Comprehensive metabolic panel Lab Collect; Future    Pituitary macroadenoma (HCC)  -     Insulin-like growth factor 1 (IGF-1) - Lab Collect; Future  -     Growth hormone; Future  -     Comprehensive metabolic panel Lab Collect; Future        Assessment/Plan:  69-year-old male presents for follow-up of acromegaly status post transsphenoidal resection of macroadenoma of the pituitary gland and currently on medical therapy for residual disease  He does report some interruptions in receiving his Sandostatin on time from the company  I did discuss I think he will need a higher dose but he requested to continue on the 20 mg dose for now  Suggest we repeat labs in 3 months and if still above goal that we increase to 30 mg daily  We discussed rationale for controlling growth hormone levels and IGF-I levels as it relates to risk factors over time such as cardiovascular disease  We will have him follow-up in the office in the summer but we will be in touch with him as results are available  CC: Follow-up    History of Present Illness     HPI: Mamta Sheldon is a 75y o  year old male with history of acromegaly which was discovered during work-up for hypogonadotropic hypogonadism who presents for a follow-up appointment  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  He did have residual disease structurally on MRI as well as biochemically  Initially was started on lanreotide 90 mg every 4 weeks and received about 3 injections but he noted loose stools and abdominal cramping as well as inguinal hernia and therefore discontinued this medication  We did try cabergoline to provide adequate response  He then transition to Sandostatin 20 mg monthly  MRIs have been monitored through neurosurgery  He did have colonoscopy in 2021 through GI with a small polyp reported and follow-up planned in a few years  He presents today overall feeling well  He does note occasional arthralgias but otherwise no new health issues or symptoms of concern  Has follow-up with neurosurgery over the summer with an MRI ordered  Review of Systems   Constitutional: Negative for fatigue  HENT: Negative for trouble swallowing and voice change  Eyes: Negative for visual disturbance  Respiratory: Negative for shortness of breath  Cardiovascular: Negative for palpitations and leg swelling  Gastrointestinal: Negative for abdominal pain, nausea and vomiting  Endocrine: Negative for polydipsia and polyuria  Musculoskeletal: Positive for arthralgias  Negative for myalgias  Skin: Negative for rash  Neurological: Negative for dizziness, tremors and weakness  Hematological: Negative for adenopathy  Psychiatric/Behavioral: Negative for agitation and confusion         Historical Information   Past Medical History:   Diagnosis Date   • Decreased stamina    • Feeling tired    • Glaucoma     right eye   • Lipoma    • Lipoma of left shoulder    • Muscle weakness    • Open-angle glaucoma     left eye severe   • Pituitary mass [facility])      Past Surgical History:   Procedure Laterality Date   • ANKLE SURGERY      lipoma   • COLONOSCOPY     • FINGER TENDON REPAIR     • HAND SURGERY     • HERNIA REPAIR Left     inguinal   • KNEE ARTHROSCOPY     • LA EXC TUMOR SOFT TISS BACK/FLANK SUBFASCIAL <5CM N/A 11/3/2022    Procedure: EXCISION OF LIPOMAS FROM RIGHT AND LEFT UPPER BACK, RIGHT SCALP AND LEFT FOREHEAD;  Surgeon: Carline Elmore MD;  Location:  MAIN OR;  Service: Plastics   • LA Jammie [address] EXC PITUITRY MAKI TRNSNSL/SPHENOID N/A 04/25/2018    Procedure: Image guided endoscopic transsphenoidal approach for debulking of pituitary macroadenoma, abdominal fat graft harvesting;  Surgeon: Jennifer Vargas MD;  Location: [facility]; Service: Neurosurgery   • SHOULDER SURGERY      lipoma removal   • TOTAL HIP ARTHROPLASTY Right      Social History   Social History     Substance and Sexual Activity   Alcohol Use Yes   • Alcohol/week: 3 0 standard drinks   • Types: 3 Shots of liquor per week    Comment: occasional     Social History     Substance and Sexual Activity   Drug Use Never     Social History     Tobacco Use   Smoking Status Never   Smokeless Tobacco Never     Family History:   Family History   Problem Relation Age of Onset   • No Known Problems Mother    • Cancer Father        Meds/Allergies   Current Outpatient Medications   Medication Sig Dispense Refill   • B Complex Vitamins (VITAMIN B COMPLEX PO) Take by mouth daily after breakfast     • COMBIGAN 0 2-0 5 % INSTILL 1 DROP INTO LEFT EYE TWICE DAILY  5   • ibuprofen (MOTRIN) 200 mg tablet Take by mouth every 6 (six) hours as needed for mild pain     • latanoprost (XALATAN) 0 005 % ophthalmic solution Administer 1 drop into the left eye daily at bedtime      • Multiple Vitamins-Minerals (CENTRUM ADULTS PO) Take 1 tablet by mouth daily     • octreotide (SandoSTATIN [place]) 20 mg kit Inject 20 mg into a muscle every 28 days 3 kit 3   • Omega 3 1000 MG CAPS Take by mouth 3 (three) times a week (Patient not taking: Reported on 1/11/2023)     • traMADol (Ultram) 50 mg tablet Take 1 tablet (50 mg total) by mouth every 6 (six) hours as needed for moderate pain for up to 6 doses (Patient not taking: Reported on 1/11/2023) 6 tablet 0     No current facility-administered medications for this visit  No Known Allergies    Objective   Vitals: Blood pressure 130/80, pulse 61, height 5' 9" (1 753 m), weight 79 3 kg (174 lb 12 8 oz)  Invasive Devices     None                 Physical Exam  Vitals reviewed  Constitutional:       General: He is not in acute distress  Appearance: He is well-developed  He is not diaphoretic  HENT:      Head: Normocephalic and atraumatic     Eyes: Conjunctiva/sclera: Conjunctivae normal       Pupils: Pupils are equal, round, and reactive to light  Neck:      Thyroid: No thyromegaly  Cardiovascular:      Rate and Rhythm: Normal rate and regular rhythm  Heart sounds: No murmur heard  Pulmonary:      Effort: Pulmonary effort is normal  No respiratory distress  Breath sounds: Normal breath sounds  Abdominal:      General: Bowel sounds are normal       Palpations: Abdomen is soft  Musculoskeletal:         General: Normal range of motion  Cervical back: Normal range of motion and neck supple  Skin:     General: Skin is warm and dry  Findings: No rash  Neurological:      Mental Status: He is alert and oriented to person, place, and time  Motor: No abnormal muscle tone  Psychiatric:         Behavior: Behavior normal          The history was obtained from the review of the chart and from the patient      Lab Results:      Recent Results (from the past 02701 hour(s))   Comprehensive metabolic panel    Collection Time: 12/30/21  6:56 AM   Result Value Ref Range    Glucose, Random 107 (H) 65 - 99 mg/dL    BUN 21 8 - 27 mg/dL    Creatinine 0 69 (L) 0 76 - 1 27 mg/dL    eGFR Non  98 >59 mL/min/1 73    eGFR  114 >59 mL/min/1 73    SL AMB BUN/CREATININE RATIO 30 (H) 10 - 24    Sodium 140 134 - 144 mmol/L    Potassium 4 3 3 5 - 5 2 mmol/L    Chloride 104 96 - 106 mmol/L    CO2 23 20 - 29 mmol/L    CALCIUM 9 3 8 6 - 10 2 mg/dL    Protein, Total 6 3 6 0 - 8 5 g/dL    Albumin 4 0 3 8 - 4 8 g/dL    Globulin, Total 2 3 1 5 - 4 5 g/dL    Albumin/Globulin Ratio 1 7 1 2 - 2 2    TOTAL BILIRUBIN 0 7 0 0 - 1 2 mg/dL    Alk Phos Isoenzymes 64 44 - 121 IU/L    AST 20 0 - 40 IU/L    ALT 18 0 - 44 IU/L   Testosterone, free, total    Collection Time: 12/30/21  6:56 AM   Result Value Ref Range    TESTOSTERONE TOTAL 194 (L) 264 - 916 ng/dL    Testosterone, Free 5 2 (L) 6 6 - 18 1 pg/mL   T4, free    Collection Time: 12/30/21  6:56 AM   Result Value Ref Range    Free t4 1 12 0 82 - 1 77 ng/dL   TSH, 3rd generation    Collection Time: 12/30/21  6:56 AM   Result Value Ref Range    TSH 2 560 0 450 - 4 500 uIU/mL   Prolactin    Collection Time: 12/30/21  6:56 AM   Result Value Ref Range    Prolactin 10 9 4 0 - 15 2 ng/mL   Insulin-like growth factor 1 (IGF-1)    Collection Time: 12/30/21  6:56 AM   Result Value Ref Range    Insulin-Like GF-1 231 (H) 59 - 230 ng/mL   Growth hormone    Collection Time: 12/30/21  6:56 AM   Result Value Ref Range    Growth Hormone 4 2 0 0 - 10 0 ng/mL   Cortisol Level, AM Specimen    Collection Time: 12/30/21  6:56 AM   Result Value Ref Range    Cortisol AM 14 5 6 2 - 19 4 ug/dL   Insulin-like growth factor 1 (IGF-1)    Collection Time: 06/08/22  8:21 AM   Result Value Ref Range    Insulin-Like GF-1 282 (H) 59 - 230 ng/mL   Growth hormone    Collection Time: 06/08/22  8:21 AM   Result Value Ref Range    Growth Hormone 4 8 0 0 - 10 0 ng/mL   Cortisol Level, AM Specimen    Collection Time: 06/08/22  8:21 AM   Result Value Ref Range    Cortisol AM 12 2 6 2 - 19 4 ug/dL   Comprehensive metabolic panel    Collection Time: 06/20/22  6:41 AM   Result Value Ref Range    Glucose, Random 104 (H) 65 - 99 mg/dL    BUN 17 8 - 27 mg/dL    Creatinine 0 72 (L) 0 76 - 1 27 mg/dL    eGFR 100 >59 mL/min/1 73    SL AMB BUN/CREATININE RATIO 24 10 - 24    Sodium 140 134 - 144 mmol/L    Potassium 4 5 3 5 - 5 2 mmol/L    Chloride 101 96 - 106 mmol/L    CO2 23 20 - 29 mmol/L    CALCIUM 9 8 8 6 - 10 2 mg/dL    Protein, Total 6 4 6 0 - 8 5 g/dL    Albumin 3 9 3 8 - 4 8 g/dL    Globulin, Total 2 5 1 5 - 4 5 g/dL    Albumin/Globulin Ratio 1 6 1 2 - 2 2    TOTAL BILIRUBIN 0 4 0 0 - 1 2 mg/dL    Alk Phos Isoenzymes 93 44 - 121 IU/L    AST 22 0 - 40 IU/L    ALT 19 0 - 44 IU/L   Hemoglobin A1C    Collection Time: 06/20/22  6:41 AM   Result Value Ref Range    Hemoglobin A1C 5 6 4 8 - 5 6 %    Estimated Average Glucose 114 mg/dL   CBC and differential    Collection Time: 10/05/22  7:02 AM   Result Value Ref Range    White Blood Cell Count 3 8 3 4 - 10 8 x10E3/uL    Red Blood Cell Count 4 51 4 14 - 5 80 x10E6/uL    Hemoglobin 14 2 13 0 - 17 7 g/dL    HCT 41 5 37 5 - 51 0 %    MCV 92 79 - 97 fL    MCH 31 5 26 6 - 33 0 pg    MCHC 34 2 31 5 - 35 7 g/dL    RDW 13 3 11 6 - 15 4 %    Platelet Count 600 531 - 450 x10E3/uL    Neutrophils 51 Not Estab  %    Lymphocytes 36 Not Estab  %    Monocytes 8 Not Estab  %    Eosinophils 4 Not Estab  %    Basophils PCT 1 Not Estab  %    Neutrophils (Absolute) 2 0 1 4 - 7 0 x10E3/uL    Lymphocytes (Absolute) 1 4 0 7 - 3 1 x10E3/uL    Monocytes (Absolute) 0 3 0 1 - 0 9 x10E3/uL    Eosinophils (Absolute) 0 2 0 0 - 0 4 x10E3/uL    Basophils ABS 0 0 0 0 - 0 2 x10E3/uL    Immature Granulocytes 0 Not Estab  %    Immature Granulocytes (Absolute) 0 0 0 0 - 0 1 x10E3/uL   Comprehensive metabolic panel    Collection Time: 10/05/22  7:02 AM   Result Value Ref Range    Glucose, Random 104 (H) 70 - 99 mg/dL    BUN 18 8 - 27 mg/dL    Creatinine 0 65 (L) 0 76 - 1 27 mg/dL    eGFR 103 >59 mL/min/1 73    SL AMB BUN/CREATININE RATIO 28 (H) 10 - 24    Sodium 137 134 - 144 mmol/L    Potassium 4 2 3 5 - 5 2 mmol/L    Chloride 101 96 - 106 mmol/L    CO2 23 20 - 29 mmol/L    CALCIUM 9 6 8 6 - 10 2 mg/dL    Protein, Total 6 2 6 0 - 8 5 g/dL    Albumin 4 3 3 8 - 4 8 g/dL    Globulin, Total 1 9 1 5 - 4 5 g/dL    Albumin/Globulin Ratio 2 3 (H) 1 2 - 2 2    TOTAL BILIRUBIN 0 6 0 0 - 1 2 mg/dL    Alk Phos Isoenzymes 66 44 - 121 IU/L    AST 30 0 - 40 IU/L    ALT 35 0 - 44 IU/L   Testosterone, free, total    Collection Time: 10/05/22  7:02 AM   Result Value Ref Range    TESTOSTERONE TOTAL 266 264 - 916 ng/dL    Testosterone, Free 1 2 (L) 6 6 - 18 1 pg/mL   T4, free    Collection Time: 10/05/22  7:02 AM   Result Value Ref Range    Free t4 1 13 0 82 - 1 77 ng/dL   TSH, 3rd generation    Collection Time: 10/05/22  7:02 AM   Result Value Ref Range TSH 3 130 0 450 - 4 500 uIU/mL   Prolactin    Collection Time: 10/05/22  7:02 AM   Result Value Ref Range    Prolactin 11 5 4 0 - 15 2 ng/mL   Insulin-like growth factor 1 (IGF-1)    Collection Time: 10/05/22  7:02 AM   Result Value Ref Range    Insulin-Like GF-1 369 (H) 59 - 230 ng/mL   Growth hormone    Collection Time: 10/05/22  7:02 AM   Result Value Ref Range    Growth Hormone 6 2 0 0 - 10 0 ng/mL   ECG 12 lead    Collection Time: 10/31/22  2:46 PM   Result Value Ref Range    Ventricular Rate 47 BPM    Atrial Rate 47 BPM    MN Interval 178 ms    QRSD Interval 100 ms    QT Interval 432 ms    QTC Interval 382 ms    P [place] 68 degrees    QRS Axis 16 degrees    T Wave Axis 39 degrees   Tissue Exam    Collection Time: 11/03/22 10:42 AM   Result Value Ref Range    Case Report       Surgical Pathology Report                         Case: [id number]                                   Authorizing Provider:  Rosalinda Molina MD      Collected:           11/03/2022 1042              Ordering Location:     [address]     Received:            11/03/2022 [address] [facility] Operating Room                                                        Pathologist:           Tunde Shipley MD                                                         Specimens:   A) - Soft Tissue, Other, Right upper back 6x6x3                                                     B) - Soft Tissue, Other, Left upper back 7x5x1  5                                                    C) - Soft Tissue, Other, Right Scalp Mass 3 1/2x3x0  5                                               D) - Soft Tissue, Other, Left forehead mass 2x1 5                                          Final Diagnosis       A  Soft tissue, right upper back (excision):  - Benign mature fibroadipose tissue consistent with lipoma  - Skeletal muscle     B   Soft tissue, left upper back (excision):  - Benign mature fibroadipose tissue consistent with lipoma    C  Soft tissue, right scalp mass (excision):  - Benign mature fibroadipose tissue consistent with lipoma    D  Soft tissue, left forehead mass (excision):  - Benign mature fibroadipose tissue consistent with lipoma  - Skeletal muscle     Interpretation performed at [facility], [address] [facility], [address] [facility]  ; Additional Information       All reported additional testing was performed with appropriately reactive controls  These tests were developed and their performance characteristics determined by [facility] or appropriate performing facility, though some tests may be performed on tissues which have not been validated for performance characteristics (such as staining performed on alcohol exposed cell blocks and decalcified tissues)  Results should be interpreted with caution and in the context of the patients’ clinical condition  These tests may not be cleared or approved by the [place]  Food and Drug Administration, though the FDA has determined that such clearance or approval is not necessary  These tests are used for clinical purposes and they should not be regarded as investigational or for research  This laboratory has been approved by [place] [id number], designated as a high-complexity laboratory and is qualified to perform these tests  [facility] Course Description       A  The specimen is received in formalin, labeled with the patient's name and hospital number, and is designated "right upper back 6 x 6 x 3”  The specimen consists of a tan-yellow lobulated portion of fibroadipose tissue measuring 6 0 x 5 5 x 2 5 cm  The specimen is serially sectioned revealing soft, yellow and glistening cut surfaces  No gross lesions are identified  Representative sections are submitted in 3 cassettes  B  The specimen is received in formalin, labeled with the patient's name and hospital number, and is designated "left upper back 7 x 5 x 1  5”    The specimen consists of a single portion of tan-yellow lobulated fibroadipose tissue measuring 6 3 x 5 1 x 1 9 cm  The specimen is serially sectioned revealing soft, yellow and glistening cut surfaces  No gross lesions are identified  Representative sections are submitted in 3 cassettes  C  The specimen is received in formalin, labeled with the patient's name and hospital number, and is designated "right scalp mass 3 5 x 3 0 x 0 5”  The specimen consists of multiple fragmented pieces of pale yellow adipose tissue measuring in aggregate dimension 4 5 x 3 4 x 1 3 cm  Sectioning reveals soft, yellow and glistening cut surfaces  No gross lesions are identified  Representative sections are submitted in 3 cassettes  D  The specimen is received in formalin, labeled with the patient's name and hospital number, and is designated "left forehead mass 2 x 1  5”  The specimen consists of multiple portions of tan-yellow fibroadipose tissue measuring in aggregate dimension 2 9 x 2 4 x 0 7 cm  Sectioning reveals soft, tan-yellow and glistening cut surfaces  The specimen is entirely submitted in 2 cassettes  Note: The estimated total formalin fixation time based upon information provided by the submitting clinician and the standard processing schedule is under 72 hours    RRavotti      Clinical Information Right upper back 6x6x3    Comprehensive metabolic panel    Collection Time: 12/29/22  7:53 AM   Result Value Ref Range    Glucose, Random 103 (H) 70 - 99 mg/dL    BUN 25 8 - 27 mg/dL    Creatinine 0 69 (L) 0 76 - 1 27 mg/dL    eGFR 101 >59 mL/min/1 73    SL AMB BUN/CREATININE RATIO 36 (H) 10 - 24    Sodium 138 134 - 144 mmol/L    Potassium 4 4 3 5 - 5 2 mmol/L    Chloride 102 96 - 106 mmol/L    CO2 24 20 - 29 mmol/L    CALCIUM 9 8 8 6 - 10 2 mg/dL    Protein, Total 6 6 6 0 - 8 5 g/dL    Albumin 4 4 3 8 - 4 8 g/dL    Globulin, Total 2 2 1 5 - 4 5 g/dL    Albumin/Globulin Ratio 2 0 1 2 - 2 2    TOTAL BILIRUBIN 0 7 0 0 - 1 2 mg/dL    Alk Phos Isoenzymes 64 44 - 121 IU/L    AST 23 0 - 40 IU/L    ALT 23 0 - 44 IU/L   Insulin-like growth factor 1 (IGF-1)    Collection Time: 12/29/22  7:53 AM   Result Value Ref Range    Insulin-Like GF-1 348 (H) 59 - 230 ng/mL   Growth hormone    Collection Time: 12/29/22  7:53 AM   Result Value Ref Range    Growth Hormone 4 6 0 0 - 10 0 ng/mL         Future Appointments   Date Time Provider Trell Garcia   8/24/2023  3:15 PM Gee Moreland MD NEURO [facility]       Portions of the record may have been created with voice recognition software  Occasional wrong word or "sound a like" substitutions may have occurred due to the inherent limitations of voice recognition software  Read the chart carefully and recognize, using context, where substitutions have occurred

## 2023-01-24 ENCOUNTER — TELEPHONE (OUTPATIENT)
Dept: ENDOCRINOLOGY | Facility: CLINIC | Age: 68
End: 2023-01-24

## 2023-01-24 DIAGNOSIS — E22.0 ACROMEGALY (HCC): ICD-10-CM

## 2023-01-24 RX ORDER — OCTREOTIDE ACETATE,MI-SPHERES 20 MG
20 VIAL (EA) INTRAMUSCULAR
Qty: 3 KIT | Refills: 3 | Status: SHIPPED | OUTPATIENT
Start: 2023-01-24

## 2023-01-24 NOTE — TELEPHONE ENCOUNTER
Prescriber application portion completed for Sandostatin patient assistance and faxed along w/ current med list, current problem list and paper Rx to [employer] @ [phone]

## 2023-01-26 NOTE — TELEPHONE ENCOUNTER
Received fax from [employer]  Patient is approved and eligible to receive Sandostatin LAR dep[place] until 12/31/23

## 2023-04-27 NOTE — TELEPHONE ENCOUNTER
Printed Rx required to be faxed in for patient assistance  Post-Care Instructions: I reviewed with the patient in detail post-care instructions. Patient is not to engage in any heavy lifting, exercise, or swimming for the next 14 days. Should the patient develop any fevers, chills, bleeding, severe pain patient will contact the office immediately.

## 2023-07-02 LAB
ALBUMIN SERPL-MCNC: 4.3 G/DL (ref 3.8–4.8)
ALBUMIN/GLOB SERPL: 2 {RATIO} (ref 1.2–2.2)
ALP SERPL-CCNC: 62 IU/L (ref 44–121)
ALT SERPL-CCNC: 20 IU/L (ref 0–44)
AST SERPL-CCNC: 20 IU/L (ref 0–40)
BILIRUB SERPL-MCNC: 0.5 MG/DL (ref 0–1.2)
BUN SERPL-MCNC: 21 MG/DL (ref 8–27)
BUN/CREAT SERPL: 26 (ref 10–24)
CALCIUM SERPL-MCNC: 9.4 MG/DL (ref 8.6–10.2)
CHLORIDE SERPL-SCNC: 104 MMOL/L (ref 96–106)
CO2 SERPL-SCNC: 23 MMOL/L (ref 20–29)
CREAT SERPL-MCNC: 0.82 MG/DL (ref 0.76–1.27)
EGFR: 96 ML/MIN/1.73
GH SERPL-MCNC: 2.9 NG/ML (ref 0–10)
GLOBULIN SER-MCNC: 2.2 G/DL (ref 1.5–4.5)
GLUCOSE SERPL-MCNC: 105 MG/DL (ref 70–99)
IGF-I SERPL-MCNC: 283 NG/ML (ref 59–230)
POTASSIUM SERPL-SCNC: 4.2 MMOL/L (ref 3.5–5.2)
PROT SERPL-MCNC: 6.5 G/DL (ref 6–8.5)
SODIUM SERPL-SCNC: 140 MMOL/L (ref 134–144)

## 2023-08-23 ENCOUNTER — TELEPHONE (OUTPATIENT)
Dept: NEUROSURGERY | Facility: CLINIC | Age: 68
End: 2023-08-23

## 2023-08-23 NOTE — TELEPHONE ENCOUNTER
Called [facility]  [phone] spoke to [facility]. She will be faxing over report for MRI brain Pituitary for pAtient.  Patient will bring disk tomorrow for appointment

## 2023-08-24 ENCOUNTER — OFFICE VISIT (OUTPATIENT)
Dept: NEUROSURGERY | Facility: CLINIC | Age: 68
End: 2023-08-24
Payer: COMMERCIAL

## 2023-08-24 VITALS
HEIGHT: 69 IN | WEIGHT: 166 LBS | OXYGEN SATURATION: 97 % | BODY MASS INDEX: 24.59 KG/M2 | SYSTOLIC BLOOD PRESSURE: 110 MMHG | DIASTOLIC BLOOD PRESSURE: 80 MMHG | HEART RATE: 51 BPM | TEMPERATURE: 98 F

## 2023-08-24 DIAGNOSIS — D35.2 PITUITARY MACROADENOMA (HCC): Primary | ICD-10-CM

## 2023-08-24 DIAGNOSIS — E22.0 ACROMEGALY (HCC): ICD-10-CM

## 2023-08-24 PROCEDURE — 99214 OFFICE O/P EST MOD 30 MIN: CPT | Performed by: NURSE PRACTITIONER

## 2023-08-24 NOTE — ASSESSMENT & PLAN NOTE
· As addressed in HPI  · 1 year f/u visit with imagining for review and reassessment. · Non-focal examination    Imagining   · 8/9/2023 MRI brain and pituitary with and without IV contrast - grossly stable pituitary and clivus mass as described above which may be due to differences in position. Stable involvement of the clivus and left cavernous venous sinus. Stable postoperative changes. Grossly stable pituitary sella and clival mass a symmetric to left measuring approximately 2.3 x 2.9 x 2.5 cm versus 2.2 x 2.8 x 2.5 cm. There is stable displacement of the pituitary stalk to the right. Stable involvement of the clavus. There is stable enlargement of the left cavernous venous sinus similar to before. Stable postoperative changes involving the sphenoid sinus. The optic chiasm is stable in appearance. There is interval decrease in size of hyperintense T1 signal lesion in the right parietal scalp measuring approximately 2.5 x 0.8 cm correlate with history of recent surgery or biopsy. Plan   · Reviewed Imagining   · Ordered f/u in 1 year 1 weeks post repeat imagining   · Ordered MRI brain and pituitary with and without IV contrast  Due 8/9/24 , associated labs ordered. · Advised if he has additional questions or concerns call the office. · Reviwed red flag signs for pituitary adenoma .

## 2023-08-24 NOTE — PROGRESS NOTES
Assessment/Plan:    Pituitary macroadenoma ([address])  · As addressed in HPI  · 1 year f/u visit with imagining for review and reassessment. · Non-focal examination    Imagining   · 8/9/2023 MRI brain and pituitary with and without IV contrast - grossly stable pituitary and clivus mass as described above which may be due to differences in position. Stable involvement of the clivus and left cavernous venous sinus. Stable postoperative changes. Grossly stable pituitary sella and clival mass a symmetric to left measuring approximately 2.3 x 2.9 x 2.5 cm versus 2.2 x 2.8 x 2.5 cm. There is stable displacement of the pituitary stalk to the right. Stable involvement of the clavus. There is stable enlargement of the left cavernous venous sinus similar to before. Stable postoperative changes involving the sphenoid sinus. The optic chiasm is stable in appearance. There is interval decrease in size of hyperintense T1 signal lesion in the right parietal scalp measuring approximately 2.5 x 0.8 cm correlate with history of recent surgery or biopsy. Plan   · Reviewed Imagining   · Ordered f/u in 1 year 1 weeks post repeat imagining   · Ordered MRI brain and pituitary with and without IV contrast  Due 8/9/24 , associated labs ordered. · Advised if he has additional questions or concerns call the office. · Reviwed red flag signs for pituitary adenoma . There are no diagnoses linked to this encounter. Subjective:     Patient ID: Ricardo Wills is a 70 y.o. male     HPI   F/u for Pituitary macroadenoma, S/p (GRISELDA) Image guided endoscopic transsphenoidal approach for debulking of pituitary macroadenoma, abdominal fat graft harvesting [    At last visit 8/11/22 w/ DR VILLALOBOS he was well an asymptomaticHe denies any changes in his vision. He has had no changes in his urination. He denies any weight gain or weight loss. He has had no difficultly with sleeping. He has a history of chronic headaches.  He is alert and oriented. Dr. Ragini Luong provided d Documentation was provided for clearance to re-certify as an  w/[employer]. 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 He was scheduled for a 1 year f/u visit with MRI pituitary w/wo. He completed imagining at [facility] and brought disc to appointment, reports received in advance. He is under care of endocrine, Dr. Aleyda Page, monitoring IGF-1, cortisol levels and CMP   ,Is treating with Sandostatin 20 mg monthly dosing , which he received monthly from the company through [employer] patient assistance program.. He has a history of acromegaly which was discovered during work-up for hypogonadotropic hypogonadism. He reports the Sandostatin is causing GI upset and has discussed with endocrine . Recent endo labs CMP, IGF-1, and GH6/29/23 , ENDO has addressed with the patient     He reports recent vision teste by ophthalmologist at [facility], he denies visual dficit or problems he does not wear corrective lenses,  . He presents today for 1 year f/u appointment for assessment imagining review. REVIEW OF SYSTEMS  Review of Systems   Constitutional: Negative. HENT: Negative. Negative for rhinorrhea. Eyes: Negative. Negative for photophobia and visual disturbance. Respiratory: Negative. Cardiovascular: Negative. Gastrointestinal: Negative. Negative for nausea and vomiting. Endocrine: Negative. Genitourinary: Negative. Musculoskeletal: Negative. Negative for gait problem and myalgias. Per patient, feels unchanged since last visit    No ST     No falls     Skin: Negative. Allergic/Immunologic: Negative. Neurological: Negative. Negative for dizziness, tremors, seizures, speech difficulty, weakness, numbness and headaches. Hematological: Negative. Psychiatric/Behavioral: Negative. Negative for confusion, decreased concentration and sleep disturbance.          Meds/Allergies Current Outpatient Medications   Medication Sig Dispense Refill   • B Complex Vitamins (VITAMIN B COMPLEX PO) Take by mouth daily after breakfast     • COMBIGAN 0.2-0.5 % INSTILL 1 DROP INTO LEFT EYE TWICE DAILY  5   • ibuprofen (MOTRIN) 200 mg tablet Take by mouth every 6 (six) hours as needed for mild pain     • latanoprost (XALATAN) 0.005 % ophthalmic solution Administer 1 drop into the left eye daily at bedtime      • Multiple Vitamins-Minerals (CENTRUM ADULTS PO) Take 1 tablet by mouth daily     • octreotide (SandoSTATIN LAR Depot) 20 mg kit Inject 20 mg into a muscle every 28 days 3 kit 3   • Omega 3 1000 MG CAPS Take by mouth 3 (three) times a week (Patient not taking: Reported on 1/11/2023)     • traMADol (Ultram) 50 mg tablet Take 1 tablet (50 mg total) by mouth every 6 (six) hours as needed for moderate pain for up to 6 doses (Patient not taking: Reported on 1/11/2023) 6 tablet 0     No current facility-administered medications for this visit.        No Known Allergies    PAST HISTORY    Past Medical History:   Diagnosis Date   • Decreased stamina    • Feeling tired    • Glaucoma     right eye   • Lipoma    • Lipoma of left shoulder    • Muscle weakness    • Open-angle glaucoma     left eye severe   • Pituitary mass [facility])        Past Surgical History:   Procedure Laterality Date   • ANKLE SURGERY      lipoma   • COLONOSCOPY     • FINGER TENDON REPAIR     • HAND SURGERY     • HERNIA REPAIR Left     inguinal   • KNEE ARTHROSCOPY     • RI EXC TUMOR SOFT TISS BACK/FLANK SUBFASCIAL <5CM N/A 11/3/2022    Procedure: EXCISION OF LIPOMAS FROM RIGHT AND LEFT UPPER BACK, RIGHT SCALP AND LEFT FOREHEAD;  Surgeon: Jocelynn Hawley MD;  Location:  MAIN OR;  Service: Plastics   • RI Charu TRNSNSL/SPHENOID N/A 04/25/2018    Procedure: Image guided endoscopic transsphenoidal approach for debulking of pituitary macroadenoma, abdominal fat graft harvesting;  Surgeon: Adam Mitchell MD; [facility]: [facility];  Service: Neurosurgery   • SHOULDER SURGERY      lipoma removal   • TOTAL HIP ARTHROPLASTY Right        Social History     Tobacco Use   • Smoking status: Never   • Smokeless tobacco: Never   Vaping Use   • Vaping Use: Never used   Substance Use Topics   • Alcohol use: Yes     Alcohol/week: 3.0 standard drinks of alcohol     Types: 3 Shots of liquor per week     Comment: occasional   • Drug use: Never       Family History   Problem Relation Age of Onset   • No Known Problems Mother    • Cancer Father        The following portions of the patient's history were reviewed and updated as appropriate: allergies, current medications, past family history, past medical history, past social history, past surgical history and problem list.      EXAM    Vitals:Blood pressure 110/80, pulse (!) 51, temperature 98 °F (36.7 °C), temperature source Temporal, height 5' 9" (1.753 m), weight 75.3 kg (166 lb), SpO2 97 %. ,Body mass index is 24.51 kg/m². Physical Exam  Vitals and nursing note reviewed. Constitutional:       General: He is not in acute distress. Appearance: Normal appearance. He is not ill-appearing, toxic-appearing or diaphoretic. HENT:      Head: Normocephalic and atraumatic. Eyes:      General: No scleral icterus. Right eye: No discharge. Left eye: No discharge. Extraocular Movements: Extraocular movements intact. Conjunctiva/sclera: Conjunctivae normal.      Pupils: Pupils are equal, round, and reactive to light. Cardiovascular:      Rate and Rhythm: Normal rate and regular rhythm. Pulmonary:      Effort: Pulmonary effort is normal. No respiratory distress. Musculoskeletal:      Right lower leg: No edema. Left lower leg: No edema. Neurological:      General: No focal deficit present. Mental Status: He is alert and oriented to person, place, and time. Cranial Nerves: No cranial nerve deficit. Sensory: No sensory deficit.       Motor: Motor strength is normal.No weakness. Coordination: Coordination normal.      Gait: Gait is intact. Gait normal.      Deep Tendon Reflexes: Reflexes normal.   Psychiatric:         Mood and Affect: Mood normal.         Behavior: Behavior normal.         Neurologic Exam     Mental Status   Oriented to person, place, and time. Level of consciousness: alert    Cranial Nerves     CN III, IV, VI   Pupils are equal, round, and reactive to light. Motor Exam   Muscle bulk: normal    Strength   Strength 5/5 throughout. Sensory Exam   Light touch normal.     Gait, Coordination, and Reflexes     Gait  Gait: normal      Imaging Studies  MRI outside images    Result Date: 8/24/2023  Narrative: [id number]. [id number]      I have personally reviewed pertinent reports. and I have personally reviewed pertinent films in PACS    I have spent a total time of 35 minutes on 08/24/23 in caring for this patient including Diagnostic results, Risks and benefits of tx options, Instructions for management, Patient and family education, Importance of tx compliance, Risk factor reductions, Impressions, Counseling / Coordination of care, Documenting in the medical record, Reviewing / ordering tests, medicine, procedures  , Obtaining or reviewing history   and Communicating with other healthcare professionals .

## 2023-09-19 ENCOUNTER — OFFICE VISIT (OUTPATIENT)
Dept: ENDOCRINOLOGY | Facility: CLINIC | Age: 68
End: 2023-09-19
Payer: COMMERCIAL

## 2023-09-19 VITALS
SYSTOLIC BLOOD PRESSURE: 122 MMHG | BODY MASS INDEX: 25.27 KG/M2 | DIASTOLIC BLOOD PRESSURE: 78 MMHG | HEIGHT: 69 IN | WEIGHT: 170.6 LBS

## 2023-09-19 DIAGNOSIS — E22.0 ACROMEGALY (HCC): Primary | ICD-10-CM

## 2023-09-19 DIAGNOSIS — D35.2 PITUITARY MACROADENOMA (HCC): ICD-10-CM

## 2023-09-19 PROCEDURE — 99214 OFFICE O/P EST MOD 30 MIN: CPT | Performed by: INTERNAL MEDICINE

## 2023-09-19 NOTE — PROGRESS NOTES
9/19/2023    Assessment/Plan      Diagnoses and all orders for this visit:    Acromegaly [facility])  -     Comprehensive metabolic panel Lab Collect; Future  -     Insulin-like growth factor 1 (IGF-1) - Lab Collect; Future  -     Prolactin Lab Collect; Future  -     Growth hormone; Future  -     Comprehensive metabolic panel Lab Collect  -     Insulin-like growth factor 1 (IGF-1) - Lab Collect  -     Prolactin Lab Collect  -     Growth hormone    Pituitary macroadenoma (HCC)  -     Comprehensive metabolic panel Lab Collect; Future  -     Insulin-like growth factor 1 (IGF-1) - Lab Collect; Future  -     Prolactin Lab Collect; Future  -     Growth hormone; Future  -     Comprehensive metabolic panel Lab Collect  -     Insulin-like growth factor 1 (IGF-1) - Lab Collect  -     Prolactin Lab Collect  -     Growth hormone        Assessment/Plan:  Acromegaly: Patient wishes to monitor off of medication. Reviewed risks of untreated or worsening gross hormone excess as it relates to symptoms but also cardiovascular risk, cardiometabolic risk such as glycemic control, effect on tumor regrowth as well as colon cancer risk. We will plan to monitor lab work as ordered above in 1-2 months and call with the results. We discussed consideration of other medications such as pasireotide and Somavert. We also discussed the possibility of radiation and considerations in this regard such as the development of hypopituitarism. CC: Follow-up    History of Present Illness     HPI: Titi Brooks is a 70y.o. year old male with history of acromegaly which was discovered during work-up for hypogonadotropic hypogonadism presents for a follow-up appointment. 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. He did have residual disease structurally on MRI as well as biochemically.   Initially he was started on lanreotide 90 mg every 4 weeks and received about 3 injections but noted loose stools and abdominal cramping as well as an inguinal hernia and therefore discontinued this medication. We did try cabergoline but this did not provide adequate response. We then transition to Sandostatin 20 mg monthly. MRIs have been monitored through neurosurgery. He did have colonoscopy in 2021 through GI with small polyp reported and follow-up planned a few years later. Last MRI was done in August 2023 showing stable findings. He presents today overall feeling well. He wished to discontinue octreotide on his own given GI side effects after injection. Review of Systems   Constitutional: Negative for fatigue. HENT: Negative for trouble swallowing and voice change. Eyes: Negative for visual disturbance. Respiratory: Negative for shortness of breath. Cardiovascular: Negative for palpitations and leg swelling. Gastrointestinal: Negative for abdominal pain, nausea and vomiting. Endocrine: Negative for polydipsia and polyuria. Musculoskeletal: Negative for arthralgias and myalgias. Skin: Negative for rash. Neurological: Negative for dizziness, tremors and weakness. Hematological: Negative for adenopathy. Psychiatric/Behavioral: Negative for agitation and confusion.        Historical Information   Past Medical History:   Diagnosis Date   • Decreased stamina    • Feeling tired    • Glaucoma     right eye   • Lipoma    • Lipoma of left shoulder    • Muscle weakness    • Open-angle glaucoma     left eye severe   • Pituitary mass [facility])      Past Surgical History:   Procedure Laterality Date   • ANKLE SURGERY      lipoma   • COLONOSCOPY     • FINGER TENDON REPAIR     • HAND SURGERY     • HERNIA REPAIR Left     inguinal   • KNEE ARTHROSCOPY     • SC EXC TUMOR SOFT TISS BACK/FLANK SUBFASCIAL <5CM N/A 11/3/2022    Procedure: EXCISION OF LIPOMAS FROM RIGHT AND LEFT UPPER BACK, RIGHT SCALP AND LEFT FOREHEAD;  Surgeon: Miquel Hilliard MD;  Location:  MAIN OR;  Service: Plastics   • MERLY Box TRNSNSL/SPHENOID N/A 04/25/2018    Procedure: Image guided endoscopic transsphenoidal approach for debulking of pituitary macroadenoma, abdominal fat graft harvesting;  Surgeon: Jerome Ball MD;  [facility];  Service: Neurosurgery   • SHOULDER SURGERY      lipoma removal   • TOTAL HIP ARTHROPLASTY Right      Social History   Social History     Substance and Sexual Activity   Alcohol Use Yes   • Alcohol/week: 3.0 standard drinks of alcohol   • Types: 3 Shots of liquor per week    Comment: occasional     Social History     Substance and Sexual Activity   Drug Use Never     Social History     Tobacco Use   Smoking Status Never   Smokeless Tobacco Never     Family History:   Family History   Problem Relation Age of Onset   • No Known Problems Mother    • Cancer Father        Meds/Allergies   Current Outpatient Medications   Medication Sig Dispense Refill   • B Complex Vitamins (VITAMIN B COMPLEX PO) Take by mouth daily after breakfast     • COMBIGAN 0.2-0.5 % INSTILL 1 DROP INTO LEFT EYE TWICE DAILY  5   • ibuprofen (MOTRIN) 200 mg tablet Take by mouth every 6 (six) hours as needed for mild pain     • latanoprost (XALATAN) 0.005 % ophthalmic solution Administer 1 drop into the left eye daily at bedtime      • Multiple Vitamins-Minerals (CENTRUM ADULTS PO) Take 1 tablet by mouth daily     • octreotide (SandoSTATIN LAR Depot) 20 mg kit Inject 20 mg into a muscle every 28 days (Patient not taking: Reported on 9/19/2023) 3 kit 3   • Omega 3 1000 MG CAPS Take by mouth 3 (three) times a week (Patient not taking: Reported on 1/11/2023)     • traMADol (Ultram) 50 mg tablet Take 1 tablet (50 mg total) by mouth every 6 (six) hours as needed for moderate pain for up to 6 doses (Patient not taking: Reported on 1/11/2023) 6 tablet 0     No current facility-administered medications for this visit.      No Known Allergies    Objective   Vitals: Blood pressure 122/78, height 5' 9" (1.753 m), weight 77.4 kg (170 lb 9.6 oz).  Invasive Devices     None                 Physical Exam  Vitals reviewed. Constitutional:       General: He is not in acute distress. Appearance: He is well-developed. He is not diaphoretic. HENT:      Head: Normocephalic and atraumatic. Eyes:      Conjunctiva/sclera: Conjunctivae normal.      Pupils: Pupils are equal, round, and reactive to light. Neck:      Thyroid: No thyromegaly. Cardiovascular:      Rate and Rhythm: Normal rate and regular rhythm. Pulmonary:      Effort: Pulmonary effort is normal. No respiratory distress. Breath sounds: Normal breath sounds. Abdominal:      General: Bowel sounds are normal.      Palpations: Abdomen is soft. Musculoskeletal:         General: Normal range of motion. Cervical back: Normal range of motion and neck supple. Skin:     General: Skin is warm and dry. Findings: No rash. Neurological:      Mental Status: He is alert and oriented to person, place, and time. Motor: No abnormal muscle tone. Psychiatric:         Behavior: Behavior normal.         The history was obtained from the review of the chart and from the patient.     Lab Results:      Component      Latest Ref Rn 6/29/2023   Glucose, Random      70 - 99 mg/dL 105 (H)    BUN      8 - 27 mg/dL 21    Creatinine      0.76 - 1.27 mg/dL 0.82    eGFR      >59 mL/min/1.73 96    SL AMB BUN/CREATININE RATIO      10 - 24  26 (H)    Sodium      134 - 144 mmol/L 140    Potassium      3.5 - 5.2 mmol/L 4.2    Chloride      96 - 106 mmol/L 104    CO2      20 - 29 mmol/L 23    CALCIUM      8.6 - 10.2 mg/dL 9.4    Total Protein      6.0 - 8.5 g/dL 6.5    Albumin      3.8 - 4.8 g/dL 4.3    Globulin, Total      1.5 - 4.5 g/dL 2.2    Albumin/Globulin Ratio      1.2 - 2.2  2.0    TOTAL BILIRUBIN      0.0 - 1.2 mg/dL 0.5    ALKALINE PHOSPHATASE ISOENZYMES      44 - 121 IU/L 62    AST      0 - 40 IU/L 20    ALT      0 - 44 IU/L 20    INSULIN-LIKE GROWTH FACTOR-1      59 - 230 ng/mL 283 (H) GROWTH HORMONE      0.0 - 10.0 ng/mL 2.9       Legend:  (H) High        Future Appointments   Date Time Provider [address]   8/20/2024  3:00 PM [facility]       Portions of the record may have been created with voice recognition software. Occasional wrong word or "sound a like" substitutions may have occurred due to the inherent limitations of voice recognition software. Read the chart carefully and recognize, using context, where substitutions have occurred.

## 2023-11-02 ENCOUNTER — TELEPHONE (OUTPATIENT)
Dept: ENDOCRINOLOGY | Facility: CLINIC | Age: 68
End: 2023-11-02

## 2023-11-02 NOTE — TELEPHONE ENCOUNTER
Received and filled out patient assistance form renewal for Sandostatin and placed on Dr. Bri Hauser desk.

## 2023-11-26 LAB
ALBUMIN SERPL-MCNC: 4.2 G/DL (ref 3.9–4.9)
ALBUMIN/GLOB SERPL: 1.9 {RATIO} (ref 1.2–2.2)
ALP SERPL-CCNC: 72 IU/L (ref 44–121)
ALT SERPL-CCNC: 29 IU/L (ref 0–44)
AST SERPL-CCNC: 27 IU/L (ref 0–40)
BILIRUB SERPL-MCNC: 0.6 MG/DL (ref 0–1.2)
BUN SERPL-MCNC: 23 MG/DL (ref 8–27)
BUN/CREAT SERPL: 24 (ref 10–24)
CALCIUM SERPL-MCNC: 10 MG/DL (ref 8.6–10.2)
CHLORIDE SERPL-SCNC: 103 MMOL/L (ref 96–106)
CO2 SERPL-SCNC: 22 MMOL/L (ref 20–29)
CREAT SERPL-MCNC: 0.95 MG/DL (ref 0.76–1.27)
EGFR: 87 ML/MIN/1.73
GH SERPL-MCNC: 15.4 NG/ML (ref 0–10)
GLOBULIN SER-MCNC: 2.2 G/DL (ref 1.5–4.5)
GLUCOSE SERPL-MCNC: 103 MG/DL (ref 70–99)
IGF-I SERPL-MCNC: 609 NG/ML (ref 59–230)
POTASSIUM SERPL-SCNC: 4.4 MMOL/L (ref 3.5–5.2)
PROLACTIN SERPL-MCNC: 15.3 NG/ML (ref 4–15.2)
PROT SERPL-MCNC: 6.4 G/DL (ref 6–8.5)
SODIUM SERPL-SCNC: 139 MMOL/L (ref 134–144)

## 2023-12-04 ENCOUNTER — OFFICE VISIT (OUTPATIENT)
Dept: ENDOCRINOLOGY | Facility: CLINIC | Age: 68
End: 2023-12-04
Payer: COMMERCIAL

## 2023-12-04 VITALS
DIASTOLIC BLOOD PRESSURE: 76 MMHG | WEIGHT: 171.8 LBS | BODY MASS INDEX: 25.45 KG/M2 | SYSTOLIC BLOOD PRESSURE: 128 MMHG | HEIGHT: 69 IN

## 2023-12-04 DIAGNOSIS — D35.2 PITUITARY MACROADENOMA (HCC): ICD-10-CM

## 2023-12-04 DIAGNOSIS — E22.0 ACROMEGALY (HCC): Primary | ICD-10-CM

## 2023-12-04 PROCEDURE — 99214 OFFICE O/P EST MOD 30 MIN: CPT | Performed by: PHYSICIAN ASSISTANT

## 2023-12-04 NOTE — PROGRESS NOTES
Established Patient Progress Note       Chief Complaint   Patient presents with   • Pituitary Problem        Impression & Plan:    Problem List Items Addressed This Visit        Endocrine    Pituitary macroadenoma ([address])    Relevant Orders    Comprehensive metabolic panel    TSH, 3rd generation    T4, free    Growth hormone    Insulin-like growth factor 1 (IGF-1) - Lab Collect    Prolactin Lab Collect    Acromegaly ([address]) - Primary     IGF is significantly higher. He was off sandostatin the past 4 months but resumed medication a few days ago. He is reporting diarrhea, but willing to continue medication  Reviewed with Dr. Sekou Kaba. Discussed alternative options such as radiation or Somavert. He is not interested in treatment with radiation or daily injections and prefers to continue Sandostatin. Advised him if GI symptoms don't improve to let us know and we can try a lower dose of medication. Prescriber portion of patient assistance application re-enrollment filled out for Sandostatin. He will complete his portion of the  paperwork and send to [employer]. Repeat lab testing ordered to be done in 3 months. Relevant Medications    octreotide (SandoSTATIN LAR [place]) 20 mg kit    Other Relevant Orders    Comprehensive metabolic panel    TSH, 3rd generation    T4, free    Growth hormone    Insulin-like growth factor 1 (IGF-1) - Lab Collect    Prolactin Lab Collect       Orders Placed This Encounter   Procedures   • Comprehensive metabolic panel     This is a patient instruction: Patient fasting for 8 hours or longer recommended. Standing Status:   Future     Number of Occurrences:   1     Standing Expiration Date:   12/4/2024   • TSH, 3rd generation     This is a patient instruction: This test is non-fasting. Please drink two glasses of water morning of bloodwork.         Standing Status:   Future     Number of Occurrences:   1     Standing Expiration Date:   12/4/2024   • T4, free     Standing Status: Future     Number of Occurrences:   1     Standing Expiration Date:   12/4/2024   • Growth hormone     Standing Status:   Future     Number of Occurrences:   1     Standing Expiration Date:   12/4/2024   • Insulin-like growth factor 1 (IGF-1) - Lab Collect     Standing Status:   Future     Number of Occurrences:   1     Standing Expiration Date:   12/4/2024   • Prolactin Lab Collect     Standing Status:   Future     Number of Occurrences:   1     Standing Expiration Date:   12/4/2024       History of Present Illness:     Ale Valle is a 70 y.o. 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 He does have history of pituitary adenoma and underwent transsphenoidal resection in April 2018. He did have residual disease structurally on MRI as well as biochemically. He was previously treated with lanreotide but this medication was discontinued due to loose stools and abdominal cramping. He was previously treated with cabergoline which did not provide adequate response. He was treated with Sandostatin LAR 20 mg monthly. This medication was stopped on his own  about 4 months ago due to GI side effects. He was seen buy Dr. Sapp Confer 9/19/2023 and additional treatment options were discussed by he declined despite discussion of risks of untreated acromegaly. He had an MRI in August 2023 which showed stable findings. He is following with neurosurgery. Recent lab testing showed elevated IGF and growth hormone levels. Patient reports that he was feeling tired and having difficulty sleeping so he resumed to the Sandostatin LAR 20 mg monthly a few days ago. He does get this medication through [facility] patient assistance program and he is due for reenrollment as his current assistance paperwork expires December 31, 2023. He does continue to report diarrhea which tends to last about 1 week after the injection. He denies any headaches or vision changes.   He denies any change in size of his hands or feet. He does have history of colon polyps and colonoscopy was performed in 2021. 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[address])   • Pituitary macroadenoma ([address])   • Acromegaly ([address])   • Hyperprolactinemia (HCC)   • Glaucoma   • Multiple lipomas      Past Medical History:   Diagnosis Date   • Decreased stamina    • Feeling tired    • Glaucoma     right eye   • Lipoma    • Lipoma of left shoulder    • Muscle weakness    • Open-angle glaucoma     left eye severe   • Pituitary mass [facility])       Past Surgical History:   Procedure Laterality Date   • ANKLE SURGERY      lipoma   • COLONOSCOPY     • FINGER TENDON REPAIR     • HAND SURGERY     • HERNIA REPAIR Left     inguinal   • KNEE ARTHROSCOPY     • HI EXC TUMOR SOFT TISS BACK/FLANK SUBFASCIAL <5CM N/A 11/3/2022    Procedure: EXCISION OF LIPOMAS FROM RIGHT AND LEFT UPPER BACK, RIGHT SCALP AND LEFT FOREHEAD;  Surgeon: Joel Rivas MD;  Location:  MAIN OR;  Service: Plastics   • HI [address] [facility] ICRA EXC PITUITRY MAKI TRNSNSL/SPHENOID N/A 04/25/2018    Procedure: Image guided endoscopic transsphenoidal approach for debulking of pituitary macroadenoma, abdominal fat graft harvesting;  Surgeon: La Nena Weaver MD;  Location:  MAIN OR;  Service: Neurosurgery   • SHOULDER SURGERY      lipoma removal   • TOTAL HIP ARTHROPLASTY Right       Family History   Problem Relation Age of Onset   • No Known Problems Mother    • Cancer Father      Social History     Tobacco Use   • Smoking status: Never   • Smokeless tobacco: Never   Substance Use Topics   • Alcohol use:  Yes     Alcohol/week: 3.0 standard drinks of alcohol     Types: 3 Shots of liquor per week     Comment: occasional     No Known Allergies    Current Outpatient Medications:   •  B Complex Vitamins (VITAMIN B COMPLEX PO), Take by mouth daily after breakfast, Disp: , Rfl:   •  COMBIGAN 0.2-0.5 %, INSTILL 1 DROP INTO LEFT EYE TWICE DAILY, Disp: , Rfl: 5  •  latanoprost (XALATAN) 0.005 % ophthalmic solution, Administer 1 drop into the left eye daily at bedtime , Disp: , Rfl:   •  Multiple Vitamins-Minerals (CENTRUM ADULTS PO), Take 1 tablet by mouth daily, Disp: , Rfl:   •  octreotide (SandoSTATIN LAR [place]) 20 mg kit, Inject 20 mg into a muscle every 28 days, Disp: , Rfl:   •  ibuprofen (MOTRIN) 200 mg tablet, Take by mouth every 6 (six) hours as needed for mild pain (Patient not taking: Reported on 12/4/2023), Disp: , Rfl:   •  Omega 3 1000 MG CAPS, Take by mouth 3 (three) times a week (Patient not taking: Reported on 1/11/2023), Disp: , Rfl:   •  traMADol (Ultram) 50 mg tablet, Take 1 tablet (50 mg total) by mouth every 6 (six) hours as needed for moderate pain for up to 6 doses (Patient not taking: Reported on 1/11/2023), Disp: 6 tablet, Rfl: 0    Review of Systems   Constitutional:  Positive for fatigue. Negative for activity change and appetite change. HENT:  Negative for sore throat, trouble swallowing and voice change. Eyes:  Negative for visual disturbance. Respiratory:  Negative for choking, chest tightness and shortness of breath. Cardiovascular:  Negative for chest pain, palpitations and leg swelling. Gastrointestinal:  Positive for diarrhea. Negative for abdominal pain and constipation. Endocrine: Negative for cold intolerance, heat intolerance, polydipsia, polyphagia and polyuria. Genitourinary:  Negative for frequency. Musculoskeletal:  Negative for arthralgias and myalgias. Skin:  Negative for rash. Neurological:  Negative for dizziness and syncope. Hematological:  Negative for adenopathy. Psychiatric/Behavioral:  Positive for sleep disturbance. All other systems reviewed and are negative. Physical Exam:  Body mass index is 25.37 kg/m².   /76 (BP Location: Left arm, Patient Position: Sitting, Cuff Size: Standard)   Ht 5' 9" (1.753 m)   Wt 77.9 kg (171 lb 12.8 oz)   BMI 25.37 kg/m²    Wt Readings from Last 3 Encounters:   12/04/23 77.9 kg (171 lb 12.8 oz)   09/19/23 77.4 kg (170 lb 9.6 oz)   08/24/23 75.3 kg (166 lb)       Physical Exam  Vitals reviewed. Constitutional:       General: He is not in acute distress. Appearance: He is well-developed. HENT:      Head: Normocephalic and atraumatic. Eyes:      Conjunctiva/sclera: Conjunctivae normal.      Pupils: Pupils are equal, round, and reactive to light. Neck:      Thyroid: No thyromegaly. Cardiovascular:      Rate and Rhythm: Normal rate and regular rhythm. Heart sounds: Normal heart sounds. No murmur heard. Pulmonary:      Effort: Pulmonary effort is normal. No respiratory distress. Breath sounds: Normal breath sounds. No wheezing or rales. Abdominal:      General: Bowel sounds are normal. There is no distension. Palpations: Abdomen is soft. Tenderness: There is no abdominal tenderness. Musculoskeletal:         General: Normal range of motion. Cervical back: Normal range of motion and neck supple. Lymphadenopathy:      Cervical: No cervical adenopathy. Skin:     General: Skin is warm and dry. Neurological:      Mental Status: He is alert and oriented to person, place, and time.          Labs:   Component      Latest Ref Rng 12/29/2022 6/29/2023   Glucose, Random      70 - 99 mg/dL 103 (H)     BUN      8 - 27 mg/dL 25     Creatinine      0.76 - 1.27 mg/dL 0.69 (L)     eGFR      >59 mL/min/1.73 101     SL AMB BUN/CREATININE RATIO      10 - 24  36 (H)     Sodium      134 - 144 mmol/L 138     Potassium      3.5 - 5.2 mmol/L 4.4     Chloride      96 - 106 mmol/L 102     CO2      20 - 29 mmol/L 24     CALCIUM      8.6 - 10.2 mg/dL 9.8     Total Protein      6.0 - 8.5 g/dL 6.6     Albumin      3.9 - 4.9 g/dL 4.4     Globulin, Total      1.5 - 4.5 g/dL 2.2     Albumin/Globulin Ratio      1.2 - 2.2  2.0     TOTAL BILIRUBIN      0.0 - 1.2 mg/dL 0.7     ALKALINE PHOSPHATASE ISOENZYMES      44 - 121 IU/L 64 AST      0 - 40 IU/L 23     ALT      0 - 44 IU/L 23     INSULIN-LIKE GROWTH FACTOR-1      59 - 230 ng/mL 348 (H)  283 (H)    GROWTH HORMONE      0.0 - 10.0 ng/mL 4.6  2.9    PROLACTIN      4.0 - 15.2 ng/mL       Component      Latest Ref Rng 11/24/2023   Glucose, Random      70 - 99 mg/dL 103 (H)    BUN      8 - 27 mg/dL 23    Creatinine      0.76 - 1.27 mg/dL 0.95    eGFR      >59 mL/min/1.73 87    SL AMB BUN/CREATININE RATIO      10 - 24  24    Sodium      134 - 144 mmol/L 139    Potassium      3.5 - 5.2 mmol/L 4.4    Chloride      96 - 106 mmol/L 103    CO2      20 - 29 mmol/L 22    CALCIUM      8.6 - 10.2 mg/dL 10.0    Total Protein      6.0 - 8.5 g/dL 6.4    Albumin      3.9 - 4.9 g/dL 4.2    Globulin, Total      1.5 - 4.5 g/dL 2.2    Albumin/Globulin Ratio      1.2 - 2.2  1.9    TOTAL BILIRUBIN      0.0 - 1.2 mg/dL 0.6    ALKALINE PHOSPHATASE ISOENZYMES      44 - 121 IU/L 72    AST      0 - 40 IU/L 27    ALT      0 - 44 IU/L 29    INSULIN-LIKE GROWTH FACTOR-1      59 - 230 ng/mL 609 (H)    GROWTH HORMONE      0.0 - 10.0 ng/mL 15.4 (H)    PROLACTIN      4.0 - 15.2 ng/mL 15.3 (H)       Legend:  (H) High  (L) Low  There are no Patient Instructions on file for this visit. Discussed with the patient and all questioned fully answered. He will call me if any problems arise. Follow-up appointment in 3 months.      Counseled patient on diagnostic results, prognosis, risk and benefit of treatment options, instruction for management, importance of treatment compliance, Risk  factor reduction and impressions    Matthew Bonilla PA-C

## 2023-12-05 RX ORDER — OCTREOTIDE ACETATE,MI-SPHERES 20 MG
20 VIAL (EA) INTRAMUSCULAR
Start: 2023-12-05

## 2023-12-05 NOTE — ASSESSMENT & PLAN NOTE
IGF is significantly higher. He was off sandostatin the past 4 months but resumed medication a few days ago. He is reporting diarrhea, but willing to continue medication  Reviewed with Dr. Marshall Guo. Discussed alternative options such as radiation or Somavert. He is not interested in treatment with radiation or daily injections and prefers to continue Sandostatin. Advised him if GI symptoms don't improve to let us know and we can try a lower dose of medication. Prescriber portion of patient assistance application re-enrollment filled out for Sandostatin. He will complete his portion of the  paperwork and send to [employer]. Repeat lab testing ordered to be done in 3 months.

## 2024-03-13 LAB
ALBUMIN SERPL-MCNC: 4.2 G/DL (ref 3.9–4.9)
ALBUMIN/GLOB SERPL: 1.9 {RATIO} (ref 1.2–2.2)
ALP SERPL-CCNC: 70 IU/L (ref 44–121)
ALT SERPL-CCNC: 24 IU/L (ref 0–44)
AST SERPL-CCNC: 22 IU/L (ref 0–40)
BILIRUB SERPL-MCNC: 0.9 MG/DL (ref 0–1.2)
BUN SERPL-MCNC: 21 MG/DL (ref 8–27)
BUN/CREAT SERPL: 23 (ref 10–24)
CALCIUM SERPL-MCNC: 9.4 MG/DL (ref 8.6–10.2)
CHLORIDE SERPL-SCNC: 103 MMOL/L (ref 96–106)
CO2 SERPL-SCNC: 23 MMOL/L (ref 20–29)
CREAT SERPL-MCNC: 0.93 MG/DL (ref 0.76–1.27)
EGFR: 89 ML/MIN/1.73
GH SERPL-MCNC: 5 NG/ML (ref 0–10)
GLOBULIN SER-MCNC: 2.2 G/DL (ref 1.5–4.5)
GLUCOSE SERPL-MCNC: 100 MG/DL (ref 70–99)
IGF-I SERPL-MCNC: 255 NG/ML (ref 59–230)
POTASSIUM SERPL-SCNC: 4.1 MMOL/L (ref 3.5–5.2)
PROLACTIN SERPL-MCNC: 12.3 NG/ML (ref 3.6–25.2)
PROT SERPL-MCNC: 6.4 G/DL (ref 6–8.5)
SODIUM SERPL-SCNC: 140 MMOL/L (ref 134–144)
T4 FREE SERPL-MCNC: 1.2 NG/DL (ref 0.82–1.77)
TSH SERPL DL<=0.005 MIU/L-ACNC: 2.98 UIU/ML (ref 0.45–4.5)

## 2024-03-15 ENCOUNTER — OFFICE VISIT (OUTPATIENT)
Dept: ENDOCRINOLOGY | Facility: CLINIC | Age: 69
End: 2024-03-15
Payer: COMMERCIAL

## 2024-03-15 VITALS
SYSTOLIC BLOOD PRESSURE: 126 MMHG | HEIGHT: 69 IN | WEIGHT: 169.8 LBS | DIASTOLIC BLOOD PRESSURE: 76 MMHG | BODY MASS INDEX: 25.15 KG/M2

## 2024-03-15 DIAGNOSIS — R79.9 ABNORMAL FINDING OF BLOOD CHEMISTRY, UNSPECIFIED: ICD-10-CM

## 2024-03-15 DIAGNOSIS — E23.0 HYPOGONADOTROPIC HYPOGONADISM (HCC): ICD-10-CM

## 2024-03-15 DIAGNOSIS — E22.0 ACROMEGALY (HCC): Primary | ICD-10-CM

## 2024-03-15 DIAGNOSIS — D35.2 PITUITARY MACROADENOMA (HCC): ICD-10-CM

## 2024-03-15 PROCEDURE — 99214 OFFICE O/P EST MOD 30 MIN: CPT | Performed by: INTERNAL MEDICINE

## 2024-03-15 NOTE — PROGRESS NOTES
3/15/2024    Assessment/Plan      Diagnoses and all orders for this visit:    Acromegaly (HCC)  -     Testosterone, free, total; Future  -     Comprehensive metabolic panel; Future  -     IGF-1; Future  -     Growth hormone; Future  -     T4, free; Future  -     TSH, 3rd generation; Future  -     Prolactin; Future  -     Hemoglobin A1C; Future  -     Lipid panel; Future  -     CBC; Future  -     Cortisol Level,7-9 AM Specimen; Future    Pituitary macroadenoma (HCC)  -     Testosterone, free, total; Future  -     Comprehensive metabolic panel; Future  -     IGF-1; Future  -     Growth hormone; Future  -     T4, free; Future  -     TSH, 3rd generation; Future  -     Prolactin; Future  -     Hemoglobin A1C; Future  -     Lipid panel; Future  -     CBC; Future  -     Cortisol Level,7-9 AM Specimen; Future    Hypogonadotropic hypogonadism (HCC)  -     Testosterone, free, total; Future  -     Comprehensive metabolic panel; Future  -     IGF-1; Future  -     Growth hormone; Future  -     T4, free; Future  -     TSH, 3rd generation; Future  -     Prolactin; Future  -     Hemoglobin A1C; Future  -     Lipid panel; Future  -     CBC; Future  -     Cortisol Level,7-9 AM Specimen; Future    Abnormal finding of blood chemistry, unspecified  -     Hemoglobin A1C; Future  -     Lipid panel; Future        Assessment/Plan:  1.  Acromegaly: Biochemically controlled has significantly improved.  We could consider an increase in his dose of his Sandostatin but for now we will continue to trend labs.  He has an MRI planned for the summer with neurosurgery follow-up.  Did discuss possibility of radiation from a treatment option perspective for acromegaly but did discuss this could lead to hypopituitarism.  Patient is comfortable continuing medication regimen at this time.  Will arrange for labs and follow-up in about 5-6 months.      CC: Follow-up    History of Present Illness     HPI: Gm Porter is a 68 y.o. year old male with  history of acromegaly which was discovered during work-up for hypogonadotropic hypogonadism presents for a follow-up appointment.  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.  He did have residual disease structurally on MRI as well as biochemically.  Initially he was started on lanreotide 90 mg every 4 weeks and received about 3 injections but noted loose stools and abdominal cramping as well as an inguinal hernia and therefore discontinued this medication.  We did try cabergoline but this did not provide adequate response.  We then transition to Sandostatin 20 mg monthly.  MRIs have been monitored through neurosurgery.  He did have colonoscopy in 2021 through GI with small polyp reported and follow-up planned a few years later.  Last MRI was done in August 2023 showing stable findings.  He presents today overall feeling well.  He wished to discontinue octreotide on his own given GI side effects after injection.  He then had biochemical worsening of gross hormone levels and resumed Sandostatin currently at a dose of 20 mg every 28 days.  He is overall doing well on current regimen.    Review of Systems   Constitutional:  Negative for fatigue.   HENT:  Negative for trouble swallowing and voice change.    Eyes:  Negative for visual disturbance.   Respiratory:  Negative for shortness of breath.    Cardiovascular:  Negative for palpitations and leg swelling.   Gastrointestinal:  Negative for abdominal pain, nausea and vomiting.   Endocrine: Negative for polydipsia and polyuria.   Musculoskeletal:  Negative for arthralgias and myalgias.   Skin:  Negative for rash.   Neurological:  Negative for dizziness, tremors and weakness.   Hematological:  Negative for adenopathy.   Psychiatric/Behavioral:  Negative for agitation and confusion.        Historical Information   Past Medical History:   Diagnosis Date    Decreased stamina     Feeling tired     Glaucoma     right eye    Lipoma     Lipoma of  left shoulder     Muscle weakness     Open-angle glaucoma     left eye severe    Pituitary mass (HCC)      Past Surgical History:   Procedure Laterality Date    ANKLE SURGERY      lipoma    COLONOSCOPY      FINGER TENDON REPAIR      HAND SURGERY      HERNIA REPAIR Left     inguinal    KNEE ARTHROSCOPY      DE EXC TUMOR SOFT TISS BACK/FLANK SUBFASCIAL <5CM N/A 11/3/2022    Procedure: EXCISION OF LIPOMAS FROM RIGHT AND LEFT UPPER BACK, RIGHT SCALP AND LEFT FOREHEAD;  Surgeon: Ace Alford MD;  Location: [facility] MAIN OR;  Service: Plastics    DE NUNDSC ICRA EXC PITUITRY MAKI TRNSNSL/SPHENOID N/A 04/25/2018    Procedure: Image guided endoscopic transsphenoidal approach for debulking of pituitary macroadenoma, abdominal fat graft harvesting;  Surgeon: Marco Antonio Agrawal MD;  Location: [facility] OR;  Service: Neurosurgery    SHOULDER SURGERY      lipoma removal    TOTAL HIP ARTHROPLASTY Right      Social History   Social History     Substance and Sexual Activity   Alcohol Use Yes    Alcohol/week: 3.0 standard drinks of alcohol    Types: 3 Shots of liquor per week    Comment: occasional     Social History     Substance and Sexual Activity   Drug Use Never     Social History     Tobacco Use   Smoking Status Never   Smokeless Tobacco Never     Family History:   Family History   Problem Relation Age of Onset    No Known Problems Mother     Cancer Father        Meds/Allergies   Current Outpatient Medications   Medication Sig Dispense Refill    B Complex Vitamins (VITAMIN B COMPLEX PO) Take by mouth daily after breakfast      COMBIGAN 0.2-0.5 % INSTILL 1 DROP INTO LEFT EYE TWICE DAILY  5    latanoprost (XALATAN) 0.005 % ophthalmic solution Administer 1 drop into the left eye daily at bedtime       Multiple Vitamins-Minerals (CENTRUM ADULTS PO) Take 1 tablet by mouth daily      octreotide (SandoSTATIN LAR [place]) 20 mg kit Inject 20 mg into a muscle every 28 days      ibuprofen (MOTRIN) 200 mg tablet Take by mouth every 6 (six) hours  "as needed for mild pain (Patient not taking: Reported on 12/4/2023)      Omega 3 1000 MG CAPS Take by mouth 3 (three) times a week (Patient not taking: Reported on 1/11/2023)      traMADol (Ultram) 50 mg tablet Take 1 tablet (50 mg total) by mouth every 6 (six) hours as needed for moderate pain for up to 6 doses (Patient not taking: Reported on 1/11/2023) 6 tablet 0     No current facility-administered medications for this visit.     No Known Allergies    Objective   Vitals: Blood pressure 126/76, height 5' 9\" (1.753 m), weight 77 kg (169 lb 12.8 oz).  Invasive Devices       None                   Physical Exam  Vitals reviewed.   Constitutional:       General: He is not in acute distress.     Appearance: He is well-developed. He is not diaphoretic.   HENT:      Head: Normocephalic and atraumatic.   Eyes:      Conjunctiva/sclera: Conjunctivae normal.      Pupils: Pupils are equal, round, and reactive to light.   Neck:      Thyroid: No thyromegaly.   Cardiovascular:      Rate and Rhythm: Normal rate and regular rhythm.   Pulmonary:      Effort: Pulmonary effort is normal. No respiratory distress.      Breath sounds: Normal breath sounds.   Abdominal:      General: Bowel sounds are normal.      Palpations: Abdomen is soft.   Musculoskeletal:         General: Normal range of motion.      Cervical back: Normal range of motion and neck supple.   Skin:     General: Skin is warm and dry.      Findings: No rash.   Neurological:      Mental Status: He is alert and oriented to person, place, and time.      Motor: No abnormal muscle tone.   Psychiatric:         Behavior: Behavior normal.         The history was obtained from the review of the chart and from the patient.    Lab Results:      Recent Results (from the past 96981 hour(s))   Comprehensive metabolic panel    Collection Time: 06/29/23  6:47 AM   Result Value Ref Range    Glucose, Random 105 (H) 70 - 99 mg/dL    BUN 21 8 - 27 mg/dL    Creatinine 0.82 0.76 - 1.27 mg/dL "    eGFR 96 >59 mL/min/1.73    SL AMB BUN/CREATININE RATIO 26 (H) 10 - 24    Sodium 140 134 - 144 mmol/L    Potassium 4.2 3.5 - 5.2 mmol/L    Chloride 104 96 - 106 mmol/L    CO2 23 20 - 29 mmol/L    CALCIUM 9.4 8.6 - 10.2 mg/dL    Protein, Total 6.5 6.0 - 8.5 g/dL    Albumin 4.3 3.8 - 4.8 g/dL    Globulin, Total 2.2 1.5 - 4.5 g/dL    Albumin/Globulin Ratio 2.0 1.2 - 2.2    TOTAL BILIRUBIN 0.5 0.0 - 1.2 mg/dL    Alk Phos Isoenzymes 62 44 - 121 IU/L    AST 20 0 - 40 IU/L    ALT 20 0 - 44 IU/L   Insulin-like growth factor 1 (IGF-1)    Collection Time: 06/29/23  6:47 AM   Result Value Ref Range    Insulin-Like GF-1 283 (H) 59 - 230 ng/mL   Growth hormone    Collection Time: 06/29/23  6:47 AM   Result Value Ref Range    Growth Hormone 2.9 0.0 - 10.0 ng/mL   Comprehensive metabolic panel Lab Collect    Collection Time: 11/24/23  9:12 AM   Result Value Ref Range    Glucose, Random 103 (H) 70 - 99 mg/dL    BUN 23 8 - 27 mg/dL    Creatinine 0.95 0.76 - 1.27 mg/dL    eGFR 87 >59 mL/min/1.73    SL AMB BUN/CREATININE RATIO 24 10 - 24    Sodium 139 134 - 144 mmol/L    Potassium 4.4 3.5 - 5.2 mmol/L    Chloride 103 96 - 106 mmol/L    CO2 22 20 - 29 mmol/L    CALCIUM 10.0 8.6 - 10.2 mg/dL    Protein, Total 6.4 6.0 - 8.5 g/dL    Albumin 4.2 3.9 - 4.9 g/dL    Globulin, Total 2.2 1.5 - 4.5 g/dL    Albumin/Globulin Ratio 1.9 1.2 - 2.2    TOTAL BILIRUBIN 0.6 0.0 - 1.2 mg/dL    Alk Phos Isoenzymes 72 44 - 121 IU/L    AST 27 0 - 40 IU/L    ALT 29 0 - 44 IU/L   Insulin-like growth factor 1 (IGF-1) - Lab Collect    Collection Time: 11/24/23  9:12 AM   Result Value Ref Range    Insulin-Like GF-1 609 (H) 59 - 230 ng/mL   Prolactin Lab Collect    Collection Time: 11/24/23  9:12 AM   Result Value Ref Range    Prolactin 15.3 (H) 4.0 - 15.2 ng/mL   Growth hormone    Collection Time: 11/24/23  9:12 AM   Result Value Ref Range    Growth Hormone 15.4 (H) 0.0 - 10.0 ng/mL   Comprehensive metabolic panel    Collection Time: 03/11/24  6:53 AM   Result  "Value Ref Range    Glucose, Random 100 (H) 70 - 99 mg/dL    BUN 21 8 - 27 mg/dL    Creatinine 0.93 0.76 - 1.27 mg/dL    eGFR 89 >59 mL/min/1.73    SL AMB BUN/CREATININE RATIO 23 10 - 24    Sodium 140 134 - 144 mmol/L    Potassium 4.1 3.5 - 5.2 mmol/L    Chloride 103 96 - 106 mmol/L    CO2 23 20 - 29 mmol/L    CALCIUM 9.4 8.6 - 10.2 mg/dL    Protein, Total 6.4 6.0 - 8.5 g/dL    Albumin 4.2 3.9 - 4.9 g/dL    Globulin, Total 2.2 1.5 - 4.5 g/dL    Albumin/Globulin Ratio 1.9 1.2 - 2.2    TOTAL BILIRUBIN 0.9 0.0 - 1.2 mg/dL    Alk Phos Isoenzymes 70 44 - 121 IU/L    AST 22 0 - 40 IU/L    ALT 24 0 - 44 IU/L   TSH, 3rd generation    Collection Time: 03/11/24  6:53 AM   Result Value Ref Range    TSH 2.980 0.450 - 4.500 uIU/mL   T4, free    Collection Time: 03/11/24  6:53 AM   Result Value Ref Range    Free t4 1.20 0.82 - 1.77 ng/dL   Growth hormone    Collection Time: 03/11/24  6:53 AM   Result Value Ref Range    Growth Hormone 5.0 0.0 - 10.0 ng/mL   Insulin-like growth factor 1 (IGF-1) - Lab Collect    Collection Time: 03/11/24  6:53 AM   Result Value Ref Range    Insulin-Like GF-1 255 (H) 59 - 230 ng/mL   Prolactin Lab Collect    Collection Time: 03/11/24  6:53 AM   Result Value Ref Range    Prolactin 12.3 3.6 - 25.2 ng/mL         Future Appointments   Date Time Provider Department Center   8/20/2024  3:00 PM KURT Hong [facility]       Portions of the record may have been created with voice recognition software. Occasional wrong word or \"sound a like\" substitutions may have occurred due to the inherent limitations of voice recognition software. Read the chart carefully and recognize, using context, where substitutions have occurred.    "

## 2024-07-03 ENCOUNTER — TELEPHONE (OUTPATIENT)
Dept: NEUROSURGERY | Facility: CLINIC | Age: 69
End: 2024-07-03

## 2024-07-03 NOTE — TELEPHONE ENCOUNTER
7/3/24: CALLED PT  HAD TO L/M REMINDING PT TO HAVE MRI BRAIN PT DONE BY 1 YR FOLLOW UP WITH EPHRAIM 8/22/24.     PER PTS INSURANCE HE IS CAPITATED TO GO TO [facility].   ASKED PT TO CALL WHEN THAT IS SCHEDULED

## 2024-09-24 ENCOUNTER — TELEPHONE (OUTPATIENT)
Dept: NEUROSURGERY | Facility: CLINIC | Age: 69
End: 2024-09-24

## 2024-09-26 ENCOUNTER — TELEPHONE (OUTPATIENT)
Dept: NEUROSURGERY | Facility: CLINIC | Age: 69
End: 2024-09-26

## 2024-09-26 NOTE — TELEPHONE ENCOUNTER
Spoke to Mar in the radiology department at [place] ([phone]) who will fax me the report for this patients MRI Brain from 9/16/24.

## 2024-09-27 ENCOUNTER — OFFICE VISIT (OUTPATIENT)
Dept: NEUROSURGERY | Facility: CLINIC | Age: 69
End: 2024-09-27
Payer: COMMERCIAL

## 2024-09-27 VITALS
RESPIRATION RATE: 16 BRPM | BODY MASS INDEX: 25.03 KG/M2 | WEIGHT: 169 LBS | OXYGEN SATURATION: 98 % | DIASTOLIC BLOOD PRESSURE: 76 MMHG | TEMPERATURE: 97.6 F | HEIGHT: 69 IN | HEART RATE: 58 BPM | SYSTOLIC BLOOD PRESSURE: 128 MMHG

## 2024-09-27 DIAGNOSIS — D35.2 PITUITARY MACROADENOMA (HCC): Primary | ICD-10-CM

## 2024-09-27 PROCEDURE — 99214 OFFICE O/P EST MOD 30 MIN: CPT | Performed by: NURSE PRACTITIONER

## 2024-09-27 NOTE — PROGRESS NOTES
Ambulatory Visit  Name: Gm Porter      : 1955      MRN: [id number]  Encounter Provider: KURT Hong  Encounter Date: 2024   Encounter department: [facility] [place]    Assessment & Plan  Pituitary macroadenoma (HCC)  As addressed in HPI  He presents for 1 year surveillance follow-up visit today with imaging for review and reassessment.  He denies symptoms and presents overall in well condition.  Reports some trouble sleeping. He has a history of depression. He continues with issues with Aviation and requesting frequent eye exminations,  Last endocrine visit 3/15/2024  DX acromegaly, pituitary macroadenoma, hypogonadotropic hypogonadism.after surgery has residual disease structurally on mRI and bio-chemically.  Several medications tried for biochemical abnormality and his he is currently treating with Sandostatin. he continues treating ith Sandostatin.   Discussed possibility of radiation as a treatment option perspective for acromegaly, but has adverse effect for hypopituitarism. Patient wishes to continue medication regimen at this time. Labs and f/u in 5-6 months     Ophthalmology [facility] --Last examination 2024, focus of appointment OCT optic nerve.  Notified office today when patient arrived for visit and requested notes before last visit, refer to media for examination report.  He underwent an optic nerve tomographic findings OD nerve fiber layer loss inferotemporal and OD glaucoma open-angle.  There is no visual field diagrams attached with report.  Visual field test full to confrontation OU.  Patient scheduled for 6-month follow-up visit      Imagining  Of note patient did not complete MRI brain pituitary with and without contrast as ordered at last office visit and due for this visit in .  Instead of another order was entered via [place] on 2024 for a MRI of the brain with and without.  2024 MRI brain  w/wo --completed at an out of network facility [facility] enhancing pituitary sella/clival mass is measuring approximately 2.8 x 2.3 x 2.5 sonometer's without interval change.  There is stable involvement of the left cavernous venous sinus.  Stable deviation of the pituitary infundibulum to the right.  Stable hypointense T1 signal lesion in the right parietal scalp measuring 2.6 x 0.9 cm in axial plane no evidence of suspicious intracranial parenchymal enhancement.  No evidence of parenchymal meningeal enhancement.  No mass effect or midline shift.  Stable mucosal thickening in the sphenoid bone. Impression stable pituitary/clival mass with left cavernous venous sinus involvement.  No acute intracranial pathology.  Stable right parietal scalp hyperintense T1 signal lesion.      Plan  Reviewed imaging with patient contrast seen prior imaging and the fact that this current imaging is not specific for the pituitary as I ordered in 2023 for completion this year.  For unclear reason MRI was changed to an MRI of the brain with and without.  Explain in addition to pituitary lesion there is also a stable right parietal scalp hyperintense T1 signal lesion.  Explained we will order follow-up imaging due in 1 year 9/16/2025 MRI pituitary with and without this will capture the pituitary as well as the right parietal scalp hyperintense T1 signal lesion.  Patient reports he will have imaging completed at [facility], checkout is assisting patient with scheduling as well as follow-up appointment due to 3 days to 1 week post completion of imaging in 2025.  Advised we will need annual ophthalmology visual field testing due to prior next office visit in 2025 request patient bring copy of examination and/or ask ophthalmology office to fax to my attention.  Advised to continue care with endocrinology, Dr. Mari, as per recent office visit note from 3/15/2024 schedule 6-month follow-up visit.  Has follow-up visit in 6  months.  Reviewed red flag symptoms associated with abnormal pituitary function advised if they develop he should contact the neurosurgery office for a sooner follow-up appointment and imaging.  He should also contact endocrinology of abnormalities for sooner lab completion.  Advised patient if he has additional questions or concerns he should contact the neurosurgery office.  Expresses concern regarding another annual visit and contrast explained contrast media as gadolinium.  He wanted my personal opinion if he continues to require annual imaging visits.  Reinforced with the patient we will follow NCCN guidelines which are based on research and 1 year annual surveillance imaging is recommended. She is unsure if will continue with annual imagining.  Explained it is his decision to proceed with annual surveillance imagining, I have provided the rational , now he can make and informed decision. He agrees to proceed with annual imagining surveillance in 2025.    Orders:    MRI brain pituitary wo and w contrast; Future      History of Present Illness   Annual surveillance visit status post surgery for macroadenoma. Last OV 8/24/2023  Pituitary macroadenoma, S/p surgery (Dr. VILLALOBOS)4/25/2018  Image guided endoscopic transsphenoidal approach for debulking of pituitary macroadenoma, abdominal fat graft harvesting   DX:  Hypogonadotropic hypogonadism  Pituitary macroadenoma  Elevated insulin-like growth factor 1 (IGF-1) level     Continued endocrine care w/ Dr Mari , ongoing lab monitoring    [facility].---    HPI  Review of Systems   Constitutional:  Negative for fatigue.   HENT:  Negative for tinnitus.    Eyes:  Negative for visual disturbance.   Gastrointestinal: Negative.    Genitourinary: Negative.    Musculoskeletal:  Negative for gait problem.   Neurological:  Negative for dizziness, speech difficulty, weakness, light-headedness, numbness and headaches.   Hematological:  Does  not bruise/bleed easily.   Psychiatric/Behavioral:  Positive for sleep disturbance. Negative for confusion and decreased concentration.      I have personally reviewed the MA's review of systems and made changes as necessary.    Pertinent Medical History         Medical History Reviewed by provider this encounter:       Past Medical History   Past Medical History:   Diagnosis Date    Decreased stamina     Feeling tired     Glaucoma     right eye    Lipoma     Lipoma of left shoulder     Muscle weakness     Open-angle glaucoma     left eye severe    Pituitary mass (HCC)      Past Surgical History:   Procedure Laterality Date    ANKLE SURGERY      lipoma    COLONOSCOPY      FINGER TENDON REPAIR      HAND SURGERY      HERNIA REPAIR Left     inguinal    KNEE ARTHROSCOPY      CO EXC TUMOR SOFT TISS BACK/FLANK SUBFASCIAL <5CM N/A 11/3/2022    Procedure: EXCISION OF LIPOMAS FROM RIGHT AND LEFT UPPER BACK, RIGHT SCALP AND LEFT FOREHEAD;  Surgeon: Ace Alford MD;  Location:  MAIN OR;  Service: Plastics    CO NUNDSC ICRA EXC PITUITRY MAKI TRNSNSL/SPHENOID N/A 04/25/2018    Procedure: Image guided endoscopic transsphenoidal approach for debulking of pituitary macroadenoma, abdominal fat graft harvesting;  Surgeon: Marco Antonio Agrawal MD;  Location:  MAIN OR;  Service: Neurosurgery    SHOULDER SURGERY      lipoma removal    TOTAL HIP ARTHROPLASTY Right      Family History   Problem Relation Age of Onset    No Known Problems Mother     Cancer Father      Current Outpatient Medications on File Prior to Visit   Medication Sig Dispense Refill    B Complex Vitamins (VITAMIN B COMPLEX PO) Take by mouth if needed      COMBIGAN 0.2-0.5 % INSTILL 1 DROP INTO LEFT EYE TWICE DAILY  5    latanoprost (XALATAN) 0.005 % ophthalmic solution Administer 1 drop into the left eye daily at bedtime       Multiple Vitamins-Minerals (CENTRUM ADULTS PO) Take 1 tablet by mouth if needed      octreotide (SandoSTATIN [place]AR [place]) 20 mg kit Inject 20  mg into a muscle every 28 days      ibuprofen (MOTRIN) 200 mg tablet Take by mouth every 6 (six) hours as needed for mild pain (Patient not taking: Reported on 12/4/2023)      Omega 3 1000 MG CAPS Take by mouth 3 (three) times a week (Patient not taking: Reported on 1/11/2023)      traMADol (Ultram) 50 mg tablet Take 1 tablet (50 mg total) by mouth every 6 (six) hours as needed for moderate pain for up to 6 doses (Patient not taking: Reported on 1/11/2023) 6 tablet 0     No current facility-administered medications on file prior to visit.   No Known Allergies   Current Outpatient Medications on File Prior to Visit   Medication Sig Dispense Refill    B Complex Vitamins (VITAMIN B COMPLEX PO) Take by mouth if needed      COMBIGAN 0.2-0.5 % INSTILL 1 DROP INTO LEFT EYE TWICE DAILY  5    latanoprost (XALATAN) 0.005 % ophthalmic solution Administer 1 drop into the left eye daily at bedtime       Multiple Vitamins-Minerals (CENTRUM ADULTS PO) Take 1 tablet by mouth if needed      octreotide (SandoSTATIN LAR [place]) 20 mg kit Inject 20 mg into a muscle every 28 days      ibuprofen (MOTRIN) 200 mg tablet Take by mouth every 6 (six) hours as needed for mild pain (Patient not taking: Reported on 12/4/2023)      Omega 3 1000 MG CAPS Take by mouth 3 (three) times a week (Patient not taking: Reported on 1/11/2023)      traMADol (Ultram) 50 mg tablet Take 1 tablet (50 mg total) by mouth every 6 (six) hours as needed for moderate pain for up to 6 doses (Patient not taking: Reported on 1/11/2023) 6 tablet 0     No current facility-administered medications on file prior to visit.      Social History     Tobacco Use    Smoking status: Never    Smokeless tobacco: Never   Vaping Use    Vaping status: Never Used   Substance and Sexual Activity    Alcohol use: Yes     Alcohol/week: 3.0 standard drinks of alcohol     Types: 3 Shots of liquor per week     Comment: occasional    Drug use: Never    Sexual activity: Not Currently     Objective  "    /76 (BP Location: Left arm, Patient Position: Sitting, Cuff Size: Standard)   Pulse 58   Temp 97.6 °F (36.4 °C) (Temporal)   Resp 16   Ht 5' 9\" (1.753 m)   Wt 76.7 kg (169 lb)   SpO2 98%   BMI 24.96 kg/m²     Physical Exam  Vitals and nursing note reviewed.   Constitutional:       General: He is not in acute distress.     Appearance: He is not ill-appearing or toxic-appearing.   Eyes:      General: No scleral icterus.        Right eye: No discharge.         Left eye: No discharge.      Conjunctiva/sclera: Conjunctivae normal.   Pulmonary:      Effort: Pulmonary effort is normal. No respiratory distress.   Musculoskeletal:      Right lower leg: No edema.      Left lower leg: No edema.   Neurological:      Mental Status: He is alert and oriented to person, place, and time.      Motor: Motor strength is normal.     Gait: Gait is intact.   Psychiatric:      Comments: Appears angry and frustrated.  Reluctantly answer questions about ophthalmology.  Is complaining again about aviation requirements for repeated eye examinations.  Expresses concern regarding another annual visit and contrast explained contrast media as gadolinium.  He wanted my personal opinion if he continues to require annual imaging visits.  Reinforced with the patient we will follow NCCN guidelines which are based on research and 1 year annual surveillance imaging is recommended.       Neurologic Exam     Mental Status   Oriented to person, place, and time.   Level of consciousness: alert    Motor Exam   Muscle bulk: decreased  Overall muscle tone: normal    Strength   Strength 5/5 throughout.     Sensory Exam   Light touch normal.     Gait, Coordination, and Reflexes     Gait  Gait: normal      I personally reviewed the following image studies in PACS and associated radiology reports: MRI brain. My interpretation of the radiology images/reports is: MR brain w/wo pituitary stable pit residule adenoma postop .    Administrative " Statements   I have spent a total time of 30 minutes in caring for this patient on the day of the visit/encounter including Diagnostic results, Risks and benefits of tx options, Instructions for management, Patient and family education, Importance of tx compliance, Risk factor reductions, Impressions, Counseling / Coordination of care, Documenting in the medical record, Reviewing / ordering tests, medicine, procedures  , Obtaining or reviewing history  , and Communicating with other healthcare professionals .

## 2024-09-29 NOTE — ASSESSMENT & PLAN NOTE
As addressed in HPI  He presents for 1 year surveillance follow-up visit today with imaging for review and reassessment.  He denies symptoms and presents overall in well condition.  Reports some trouble sleeping. He has a history of depression. He continues with issues with Aviation and requesting frequent eye exminations,  Last endocrine visit 3/15/2024  DX acromegaly, pituitary macroadenoma, hypogonadotropic hypogonadism.after surgery has residual disease structurally on mRI and bio-chemically.  Several medications tried for biochemical abnormality and his he is currently treating with Sandostatin. he continues treating ith Sandostatin.   Discussed possibility of radiation as a treatment option perspective for acromegaly, but has adverse effect for hypopituitarism. Patient wishes to continue medication regimen at this time. Labs and f/u in 5-6 months     Ophthalmology [facility] --Last examination September 19, 2024, focus of appointment OCT optic nerve.  Notified office today when patient arrived for visit and requested notes before last visit, refer to media for examination report.  He underwent an optic nerve tomographic findings OD nerve fiber layer loss inferotemporal and OD glaucoma open-angle.  There is no visual field diagrams attached with report.  Visual field test full to confrontation OU.  Patient scheduled for 6-month follow-up visit      Imagining  Of note patient did not complete MRI brain pituitary with and without contrast as ordered at last office visit and due for this visit in 2024.  Instead of another order was entered via [place] on 9/16/2024 for a MRI of the brain with and without.  9/16/2024 MRI brain w/wo --completed at an out of network facility [facility] enhancing pituitary sella/clival mass is measuring approximately 2.8 x 2.3 x 2.5 sonometer's without interval change.  There is stable involvement of the left cavernous venous sinus.  Stable deviation of  the pituitary infundibulum to the right.  Stable hypointense T1 signal lesion in the right parietal scalp measuring 2.6 x 0.9 cm in axial plane no evidence of suspicious intracranial parenchymal enhancement.  No evidence of parenchymal meningeal enhancement.  No mass effect or midline shift.  Stable mucosal thickening in the sphenoid bone. Impression stable pituitary/clival mass with left cavernous venous sinus involvement.  No acute intracranial pathology.  Stable right parietal scalp hyperintense T1 signal lesion.      Plan  Reviewed imaging with patient contrast seen prior imaging and the fact that this current imaging is not specific for the pituitary as I ordered in 2023 for completion this year.  For unclear reason MRI was changed to an MRI of the brain with and without.  Explain in addition to pituitary lesion there is also a stable right parietal scalp hyperintense T1 signal lesion.  Explained we will order follow-up imaging due in 1 year 9/16/2025 MRI pituitary with and without this will capture the pituitary as well as the right parietal scalp hyperintense T1 signal lesion.  Patient reports he will have imaging completed at [facility], checkout is assisting patient with scheduling as well as follow-up appointment due to 3 days to 1 week post completion of imaging in 2025.  Advised we will need annual ophthalmology visual field testing due to prior next office visit in 2025 request patient bring copy of examination and/or ask ophthalmology office to fax to my attention.  Advised to continue care with endocrinology, Dr. Mari, as per recent office visit note from 3/15/2024 schedule 6-month follow-up visit.  Has follow-up visit in 6 months.  Reviewed red flag symptoms associated with abnormal pituitary function advised if they develop he should contact the neurosurgery office for a sooner follow-up appointment and imaging.  He should also contact endocrinology of abnormalities for sooner lab  completion.  Advised patient if he has additional questions or concerns he should contact the neurosurgery office.  Expresses concern regarding another annual visit and contrast explained contrast media as gadolinium.  He wanted my personal opinion if he continues to require annual imaging visits.  Reinforced with the patient we will follow NCCN guidelines which are based on research and 1 year annual surveillance imaging is recommended. She is unsure if will continue with annual imagining.  Explained it is his decision to proceed with annual surveillance imagining, I have provided the rational , now he can make and informed decision. He agrees to proceed with annual imagining surveillance in 2025.    Orders:    MRI brain pituitary wo and w contrast; Future

## 2024-10-20 LAB
ALBUMIN SERPL-MCNC: 4.1 G/DL (ref 3.9–4.9)
ALP SERPL-CCNC: 69 IU/L (ref 44–121)
ALT SERPL-CCNC: 26 IU/L (ref 0–44)
AST SERPL-CCNC: 29 IU/L (ref 0–40)
BILIRUB SERPL-MCNC: 0.7 MG/DL (ref 0–1.2)
BUN SERPL-MCNC: 24 MG/DL (ref 8–27)
BUN/CREAT SERPL: 30 (ref 10–24)
CALCIUM SERPL-MCNC: 9.2 MG/DL (ref 8.6–10.2)
CHLORIDE SERPL-SCNC: 106 MMOL/L (ref 96–106)
CHOLEST SERPL-MCNC: 202 MG/DL (ref 100–199)
CHOLEST/HDLC SERPL: 3.2 RATIO (ref 0–5)
CO2 SERPL-SCNC: 22 MMOL/L (ref 20–29)
CORTIS AM PEAK SERPL-MCNC: 13.3 UG/DL (ref 6.2–19.4)
CREAT SERPL-MCNC: 0.79 MG/DL (ref 0.76–1.27)
EGFR: 96 ML/MIN/1.73
ERYTHROCYTE [DISTWIDTH] IN BLOOD BY AUTOMATED COUNT: 12.5 % (ref 11.6–15.4)
EST. AVERAGE GLUCOSE BLD GHB EST-MCNC: 123 MG/DL
GH SERPL-MCNC: 5.7 NG/ML (ref 0–10)
GLOBULIN SER-MCNC: 2.1 G/DL (ref 1.5–4.5)
GLUCOSE SERPL-MCNC: 100 MG/DL (ref 70–99)
HBA1C MFR BLD: 5.9 % (ref 4.8–5.6)
HCT VFR BLD AUTO: 43.5 % (ref 37.5–51)
HDLC SERPL-MCNC: 64 MG/DL
HGB BLD-MCNC: 14.4 G/DL (ref 13–17.7)
IGF-I SERPL-MCNC: 264 NG/ML
LDLC SERPL CALC-MCNC: 127 MG/DL (ref 0–99)
MCH RBC QN AUTO: 32.4 PG (ref 26.6–33)
MCHC RBC AUTO-ENTMCNC: 33.1 G/DL (ref 31.5–35.7)
MCV RBC AUTO: 98 FL (ref 79–97)
PLATELET # BLD AUTO: 243 X10E3/UL (ref 150–450)
POTASSIUM SERPL-SCNC: 4 MMOL/L (ref 3.5–5.2)
PROLACTIN SERPL-MCNC: 12.6 NG/ML (ref 3.6–25.2)
PROT SERPL-MCNC: 6.2 G/DL (ref 6–8.5)
RBC # BLD AUTO: 4.44 X10E6/UL (ref 4.14–5.8)
SL AMB VLDL CHOLESTEROL CALC: 11 MG/DL (ref 5–40)
SODIUM SERPL-SCNC: 142 MMOL/L (ref 134–144)
T4 FREE SERPL-MCNC: 0.86 NG/DL (ref 0.82–1.77)
TESTOST FREE SERPL-MCNC: 3.6 PG/ML (ref 6.6–18.1)
TESTOST SERPL-MCNC: 156 NG/DL (ref 264–916)
TRIGL SERPL-MCNC: 58 MG/DL (ref 0–149)
TSH SERPL DL<=0.005 MIU/L-ACNC: 2.81 UIU/ML (ref 0.45–4.5)
WBC # BLD AUTO: 4 X10E3/UL (ref 3.4–10.8)

## 2024-10-21 ENCOUNTER — TELEPHONE (OUTPATIENT)
Dept: ENDOCRINOLOGY | Facility: CLINIC | Age: 69
End: 2024-10-21

## 2024-10-21 DIAGNOSIS — E23.0 HYPOGONADOTROPIC HYPOGONADISM (HCC): ICD-10-CM

## 2024-10-21 DIAGNOSIS — E22.0 ACROMEGALY (HCC): Primary | ICD-10-CM

## 2024-10-21 DIAGNOSIS — D35.2 PITUITARY MACROADENOMA (HCC): ICD-10-CM

## 2024-10-21 NOTE — TELEPHONE ENCOUNTER
I called and left a message for the patient to call the office back about his recent test results.

## 2024-10-21 NOTE — TELEPHONE ENCOUNTER
----- Message from Hermilo Mari DO sent at 10/21/2024  7:20 AM EDT -----  Please call patient regarding these results: Recent labs overall look stable.  IGF-1 a measure of growth hormone has normalized.  Recommend continue current regimen and repeat labs prior to next appointment.

## 2024-10-30 NOTE — TELEPHONE ENCOUNTER
Patient called back and is aware. He states he was off the Sandostatin for 6 months. He then had an injection a week or so before the labs.

## 2024-11-19 ENCOUNTER — TELEPHONE (OUTPATIENT)
Age: 69
End: 2024-11-19

## 2024-11-19 NOTE — TELEPHONE ENCOUNTER
Patient called stating he is currently in discussion with [place] about his insurance benefits.  Patient states he need a hard copy of his last blood test and a letter from the doctor stating he is currently in good health, the test results are normal and he is following up with his 6 month wellness checks.  Patient states he will come in and  the documents when they are finished.  The letter needs to be addressed to the [employer].  Please get in touch with the patient and he will provide the rest of the information.

## 2024-11-19 NOTE — TELEPHONE ENCOUNTER
Jessica,  patient called stating he is currently in discussion with [place] about his medical certificate for the [facility].  Patient states he need  a letter from the doctor he saw at his last visit 9/27/24 stating he is currently in good health, the test results are normal, Mri was reviewed and he is following up with his 1 year wellness checks.      Patient states he will come in and  the documents when they are finished along MRI reports.      The letter needs to be addressed to the [employer].    He previously had a letter from Dr Sandhu from last visit with him, he will also fax the letter he received from [facility] today to make sure we have all the info.    If you have any questions please reach out to patient this is time sensitive and pt has 30 days to send letter    Please get in touch with the patient if we have any questions.

## 2024-11-26 NOTE — TELEPHONE ENCOUNTER
Patient called stating he is currently in discussion with [place] about his medical certificate for the [employer].  Patient states he need a letter from the provider stating he is currently in good health, the test results are normal and reviewed and he is following up with his 1 year wellness checks.       Patient states he will come in and  the documents when they are finished along with a copy of his test results.       The letter needs to be addressed to the [employer].     He previously had a letter from done on 5/23/2023 from last visit.     If you have any questions please reach out to patient this is time sensitive and pt has 30 days to send letter     Please get in touch with the patient if we have any questions.

## 2024-11-26 NOTE — TELEPHONE ENCOUNTER
Patient called in to get an update on this request:    He said the letter needs to be addressed to the Office of [employer]:     Patient states that his appeal is now in [facility] to get an FAA Airman's Medical Certificate and is asking if Dr Mari can write a note similar to the one from 4/18/22 and include the last test results to get cleared on his health and state those results were within normal limits     Patient would like a call back asap with an update.

## 2024-11-26 NOTE — TELEPHONE ENCOUNTER
PT CALLED REQUESTING AN UPDATE ON THE STATUS OF REQUESTED LETTER FROM [facility] 9/27/2024 [place].    ADVISED PT THAT THE LETTER IS STILL BEING LOOKED INTO AND [facility] WILL CB WITH AN UPDATE ONCE LETTER IS AVAILABLE.    PT WAS APPRECIATIVE.

## 2024-12-06 ENCOUNTER — TELEPHONE (OUTPATIENT)
Dept: NEUROSURGERY | Facility: CLINIC | Age: 69
End: 2024-12-06

## 2024-12-06 NOTE — TELEPHONE ENCOUNTER
"I spoke with the patient to discuss the concerns he has regarding his last office visit with Jessica. Patient stated when the provider came into the exam room she appeared to be upset already and was short with him thru the duration of his appointment. He stated he has been going thru an appeals process for the last year and half and is coming up on a deadline in February. He was hoping to send the last few required medical records to [place] this weekend though after reading his last office note he is angry with the wording Jessica has chosen to write in the Psychiatric Exam and is also very upset she documented he has a history of Depression as he states he never had Depression. He stated if he send this with the wording \"Appears angry and frustrated.  Reluctantly answer questions about ophthalmology.  Is complaining again about aviation requirements for repeated eye examinations.  Expresses concern regarding another annual visit and contrast explained contrast media as gadolinium.\" it will \"end him and immediately be denied for FAA\". The patient wants the note from 9/27 to either be revised or deleted. I empathized with the patient informing him Dr. VASU Reeder requested to bring him into clinic to do an examination to determine if he can provide the note for the FAA. Patient agrees to come to 12/12 appt. In the mean time I informed the patient I will get a statement written from our conversation and sent to the provider and the  to see if we can come up with a solution.   "

## 2024-12-06 NOTE — TELEPHONE ENCOUNTER
Patient phoned and discussed with this RN his scenario related to the appeal with [employer].  Patient was requesting letter from his [facility] provider.  Patient informed that at this time we can give him his printed office visit notes and AVS which I believe has all the facts he is looking for.  Patient also had an appointment set up with Dr. Turner who had according to the patient had previously written a letter for the patient to assist with this same issue. Patient plan to review the [facility] notes and AVS for his appeal with the FAA and then if that does not meet his needs then he should keep the appointment scheduled for 12/12/2024 with Dr. Agrawal.  Patient agreeable to plan and will be stopping by the [address] [place] [address] office to  the documents.  Documents printed and given to  for patient .

## 2024-12-12 ENCOUNTER — OFFICE VISIT (OUTPATIENT)
Dept: NEUROSURGERY | Facility: CLINIC | Age: 69
End: 2024-12-12
Payer: COMMERCIAL

## 2024-12-12 VITALS
HEIGHT: 69 IN | BODY MASS INDEX: 25.03 KG/M2 | SYSTOLIC BLOOD PRESSURE: 138 MMHG | TEMPERATURE: 97.6 F | RESPIRATION RATE: 13 BRPM | HEART RATE: 72 BPM | DIASTOLIC BLOOD PRESSURE: 62 MMHG | WEIGHT: 169 LBS | OXYGEN SATURATION: 97 %

## 2024-12-12 DIAGNOSIS — D35.2 PITUITARY MACROADENOMA (HCC): Primary | ICD-10-CM

## 2024-12-12 PROCEDURE — 99213 OFFICE O/P EST LOW 20 MIN: CPT | Performed by: NEUROLOGICAL SURGERY

## 2024-12-12 NOTE — PROGRESS NOTES
"Name: Gm Porter      : 1955      MRN: [id number]  Encounter Provider: Marco Antonio Agrawal MD  Encounter Date: 2024   Encounter department: [facility] [place]  :  Assessment & Plan  Pituitary macroadenoma (HCC)  Pituitary macroadenoma, S/p 2018  Image guided endoscopic transsphenoidal approach for debulking of pituitary macroadenoma, abdominal fat graft harvesting   DX:  Hypogonadotropic hypogonadism  Pituitary macroadenoma he is followed serially by MRIs.  There has been no significant change in these images.  There is no compression of the overlying optic chiasm.  His eye examination has demonstrated no changes in visual fields.    Specifically I see no contraindication to his being able to  an aircraft based on this pituitary macroadenoma.           History of Present Illness   69-year-old male well-known to me who has a history of a pituitary macroadenoma which was debulked by surgical dissection.  There is no overlying compression of the optic chiasm.  Today he seeks a clear statement regarding the relationship of his ability to fly and the presence of the residual macroadenoma.  I see none.  He is asymptomatic.      Review of Systems   Constitutional:  Negative for fatigue.   HENT:  Negative for tinnitus.    Eyes:  Negative for visual disturbance.   Gastrointestinal: Negative.    Genitourinary: Negative.    Musculoskeletal:  Negative for gait problem.   Neurological:  Negative for dizziness, speech difficulty, weakness, numbness and headaches.   Hematological:  Does not bruise/bleed easily.   Psychiatric/Behavioral:  Negative for confusion, decreased concentration and sleep disturbance.     I have personally reviewed the MA's review of systems and made changes as necessary.         Objective   Resp 13   Ht 5' 9\" (1.753 m)   Wt 76.7 kg (169 lb)   BMI 24.96 kg/m²     Physical Exam  Vitals and nursing note reviewed.   Constitutional:       General: " He is not in acute distress.     Appearance: Normal appearance. He is normal weight. He is not ill-appearing, toxic-appearing or diaphoretic.   HENT:      Head: Normocephalic and atraumatic.      Nose: Nose normal.   Eyes:      Extraocular Movements: Extraocular movements intact.      Pupils: Pupils are equal, round, and reactive to light.   Musculoskeletal:         General: No swelling, tenderness, deformity or signs of injury. Normal range of motion.      Cervical back: Normal range of motion and neck supple.      Right lower leg: No edema.      Left lower leg: No edema.   Skin:     General: Skin is warm and dry.   Neurological:      General: No focal deficit present.      Mental Status: He is alert and oriented to person, place, and time. Mental status is at baseline.      Cranial Nerves: No cranial nerve deficit.      Sensory: No sensory deficit.      Motor: No weakness.      Coordination: Coordination normal.      Gait: Gait ( ) normal.      Deep Tendon Reflexes: Reflexes normal.   Psychiatric:         Mood and Affect: Mood normal.         Behavior: Behavior normal.         Thought Content: Thought content normal.         Judgment: Judgment normal.       Neurological Exam  Mental Status  Alert. Oriented to person, place, and time.    Cranial Nerves  CN III, IV, VI: Extraocular movements intact bilaterally. Pupils equal round and reactive to light bilaterally.    Gait   Normal gait ( ).      Radiology Results Review: I personally reviewed the following image studies in PACS and associated radiology reports: MRI brain. My interpretation of the radiology images/reports is: MRI from 9/16/2024 demonstrates no changes in the size of the residual macroadenoma/scar/..

## 2024-12-12 NOTE — ASSESSMENT & PLAN NOTE
Pituitary macroadenoma, S/p 4/25/2018  Image guided endoscopic transsphenoidal approach for debulking of pituitary macroadenoma, abdominal fat graft harvesting   DX:  Hypogonadotropic hypogonadism  Pituitary macroadenoma he is followed serially by MRIs.  There has been no significant change in these images.  There is no compression of the overlying optic chiasm.  His eye examination has demonstrated no changes in visual fields.    Specifically I see no contraindication to his being able to  an aircraft based on this pituitary macroadenoma.

## 2025-01-02 ENCOUNTER — TELEPHONE (OUTPATIENT)
Age: 70
End: 2025-01-02

## 2025-01-02 NOTE — TELEPHONE ENCOUNTER
PT CALLED REQUESTING TO SPEAK WITH [facility]  NB.    ATTEMPTED TO TRANSFER PT TO  BUT NB WAS NOT AVAILABLE.    ADVISED PT A MESSAGE WOULD BE SENT FOR CB [phone]    PT WAS APPRECIATIVE

## 2025-01-02 NOTE — TELEPHONE ENCOUNTER
Called the patient informing Brenda is unavailable but did let the patient know we are still actively working on the request to get the wording in 9/27 adjusted.

## 2025-01-14 ENCOUNTER — TELEPHONE (OUTPATIENT)
Dept: ENDOCRINOLOGY | Facility: CLINIC | Age: 70
End: 2025-01-14

## 2025-01-14 ENCOUNTER — TELEPHONE (OUTPATIENT)
Age: 70
End: 2025-01-14

## 2025-01-14 NOTE — TELEPHONE ENCOUNTER
The pt called asking if we received and filled out our portion of the Sand[employer] assistance forms. The pt stated he received his in November of 2024. He states he applies yearly for the assistance. I was unable to reach a team member at this time, please call the patient back.

## 2025-01-22 ENCOUNTER — TELEPHONE (OUTPATIENT)
Age: 70
End: 2025-01-22

## 2025-01-22 NOTE — TELEPHONE ENCOUNTER
Ritesh, from [employer], calling to confirm patient's legal name. Made awarae patients legal name is Gm Porter. Ritesh verbalized understanding.

## 2025-06-26 LAB
ALBUMIN SERPL-MCNC: 3.9 G/DL (ref 3.9–4.9)
ALP SERPL-CCNC: 72 IU/L (ref 44–121)
ALT SERPL-CCNC: 24 IU/L (ref 0–44)
AST SERPL-CCNC: 21 IU/L (ref 0–40)
BASOPHILS # BLD AUTO: 0 X10E3/UL (ref 0–0.2)
BASOPHILS NFR BLD AUTO: 1 %
BILIRUB SERPL-MCNC: 0.5 MG/DL (ref 0–1.2)
BUN SERPL-MCNC: 18 MG/DL (ref 8–27)
BUN/CREAT SERPL: 24 (ref 10–24)
CALCIUM SERPL-MCNC: 10 MG/DL (ref 8.6–10.2)
CHLORIDE SERPL-SCNC: 104 MMOL/L (ref 96–106)
CO2 SERPL-SCNC: 21 MMOL/L (ref 20–29)
CORTIS AM PEAK SERPL-MCNC: 15 UG/DL (ref 6.2–19.4)
CREAT SERPL-MCNC: 0.75 MG/DL (ref 0.76–1.27)
EGFR: 97 ML/MIN/1.73
EOSINOPHIL # BLD AUTO: 0.2 X10E3/UL (ref 0–0.4)
EOSINOPHIL NFR BLD AUTO: 5 %
ERYTHROCYTE [DISTWIDTH] IN BLOOD BY AUTOMATED COUNT: 13.1 % (ref 11.6–15.4)
FSH SERPL-ACNC: 2.2 MIU/ML (ref 1.5–12.4)
GH SERPL-MCNC: 10.1 NG/ML (ref 0–10)
GLOBULIN SER-MCNC: 2.4 G/DL (ref 1.5–4.5)
GLUCOSE SERPL-MCNC: 100 MG/DL (ref 70–99)
HCT VFR BLD AUTO: 44.3 % (ref 37.5–51)
HGB BLD-MCNC: 14.6 G/DL (ref 13–17.7)
IGF-I SERPL-MCNC: 470 NG/ML
IMM GRANULOCYTES # BLD: 0 X10E3/UL (ref 0–0.1)
IMM GRANULOCYTES NFR BLD: 0 %
LH SERPL-ACNC: 1.5 MIU/ML (ref 1.7–8.6)
LYMPHOCYTES # BLD AUTO: 1.1 X10E3/UL (ref 0.7–3.1)
LYMPHOCYTES NFR BLD AUTO: 27 %
MCH RBC QN AUTO: 31.9 PG (ref 26.6–33)
MCHC RBC AUTO-ENTMCNC: 33 G/DL (ref 31.5–35.7)
MCV RBC AUTO: 97 FL (ref 79–97)
MONOCYTES # BLD AUTO: 0.4 X10E3/UL (ref 0.1–0.9)
MONOCYTES NFR BLD AUTO: 10 %
NEUTROPHILS # BLD AUTO: 2.4 X10E3/UL (ref 1.4–7)
NEUTROPHILS NFR BLD AUTO: 57 %
PLATELET # BLD AUTO: 287 X10E3/UL (ref 150–450)
POTASSIUM SERPL-SCNC: 4.1 MMOL/L (ref 3.5–5.2)
PROT SERPL-MCNC: 6.3 G/DL (ref 6–8.5)
RBC # BLD AUTO: 4.58 X10E6/UL (ref 4.14–5.8)
SODIUM SERPL-SCNC: 139 MMOL/L (ref 134–144)
T4 FREE SERPL-MCNC: 1.19 NG/DL (ref 0.82–1.77)
TESTOST FREE SERPL-MCNC: 3.1 PG/ML (ref 6.6–18.1)
TESTOST SERPL-MCNC: 231 NG/DL (ref 264–916)
TSH SERPL DL<=0.005 MIU/L-ACNC: 2.79 UIU/ML (ref 0.45–4.5)
WBC # BLD AUTO: 4.1 X10E3/UL (ref 3.4–10.8)

## 2025-07-31 ENCOUNTER — OFFICE VISIT (OUTPATIENT)
Dept: ENDOCRINOLOGY | Facility: CLINIC | Age: 70
End: 2025-07-31
Payer: COMMERCIAL

## 2025-07-31 VITALS
SYSTOLIC BLOOD PRESSURE: 130 MMHG | HEART RATE: 58 BPM | OXYGEN SATURATION: 96 % | DIASTOLIC BLOOD PRESSURE: 78 MMHG | WEIGHT: 168.8 LBS | HEIGHT: 69 IN | BODY MASS INDEX: 25 KG/M2

## 2025-07-31 DIAGNOSIS — E78.00 ELEVATED CHOLESTEROL: ICD-10-CM

## 2025-07-31 DIAGNOSIS — R73.09 ELEVATED GLUCOSE LEVEL: ICD-10-CM

## 2025-07-31 DIAGNOSIS — E22.0 ACROMEGALY (HCC): Primary | ICD-10-CM

## 2025-07-31 DIAGNOSIS — D35.2 PITUITARY MACROADENOMA (HCC): ICD-10-CM

## 2025-07-31 DIAGNOSIS — E23.0 HYPOGONADOTROPIC HYPOGONADISM (HCC): ICD-10-CM

## 2025-07-31 PROCEDURE — 99215 OFFICE O/P EST HI 40 MIN: CPT | Performed by: INTERNAL MEDICINE

## 2025-08-14 ENCOUNTER — TELEPHONE (OUTPATIENT)
Age: 70
End: 2025-08-14

## 2025-08-15 ENCOUNTER — TELEPHONE (OUTPATIENT)
Age: 70
End: 2025-08-15

## 2025-08-18 ENCOUNTER — CLINICAL SUPPORT (OUTPATIENT)
Dept: ENDOCRINOLOGY | Facility: CLINIC | Age: 70
End: 2025-08-18
Payer: COMMERCIAL

## 2025-08-18 DIAGNOSIS — E22.0 ACROMEGALY (HCC): ICD-10-CM

## 2025-08-18 DIAGNOSIS — D35.2 PITUITARY MACROADENOMA (HCC): Primary | ICD-10-CM

## 2025-08-18 PROCEDURE — 96372 THER/PROPH/DIAG INJ SC/IM: CPT

## 2025-08-18 RX ORDER — OCTREOTIDE ACETATE 20 MG
20 KIT INTRAMUSCULAR
Status: SHIPPED | OUTPATIENT
Start: 2025-08-18

## 2025-08-19 RX ADMIN — OCTREOTIDE ACETATE 20 MG: KIT INTRAMUSCULAR at 09:28

## 2025-08-20 PROBLEM — M48.061 SPINAL STENOSIS AT L4-L5 LEVEL: Status: ACTIVE | Noted: 2025-08-20

## 2025-08-20 PROBLEM — Z96.641 STATUS POST RIGHT HIP REPLACEMENT: Status: ACTIVE | Noted: 2025-08-20

## 2025-08-20 PROBLEM — Z87.39 HISTORY OF DEGENERATIVE DISC DISEASE: Status: ACTIVE | Noted: 2025-08-20

## (undated) DEVICE — MAYFIELD® DISPOSABLE ADULT SKULL PIN (PLASTIC BASE): Brand: MAYFIELD®

## (undated) DEVICE — 10FR FRAZIER SUCTION HANDLE: Brand: CARDINAL HEALTH

## (undated) DEVICE — SPONGE PVP SCRUB WING STERILE

## (undated) DEVICE — TUBING SUCTION 5MM X 12 FT

## (undated) DEVICE — 3M™ TEGADERM™ TRANSPARENT FILM DRESSING FRAME STYLE, 1626W, 4 IN X 4-3/4 IN (10 CM X 12 CM), 50/CT 4CT/CASE: Brand: 3M™ TEGADERM™

## (undated) DEVICE — NEEDLE HYPODERMIC PRO 18G X 1 1/2IN

## (undated) DEVICE — SYRINGE DISP LUER-LOK 30 CC

## (undated) DEVICE — DRAPE TOWEL: Brand: CONVERTORS

## (undated) DEVICE — STOCKINETTE DBL PLY STERILE 8X60

## (undated) DEVICE — ELECTRODE NEEDLE MEGAFINE 2IN E-Z CLEAN MEGADYNE -0118

## (undated) DEVICE — GLOVES PI ORTHO SZ 9 LF PF

## (undated) DEVICE — TOOL 15BA50 LEGEND 15CM 5MM BA: Brand: MIDAS REX™

## (undated) DEVICE — BETADINE OINTMENT FOIL PACK

## (undated) DEVICE — Device

## (undated) DEVICE — STERISTRIP

## (undated) DEVICE — SHEATH 1912000 5PK 4MM/0DEG STORZ XOMED: Brand: ENDO-SCRUB®

## (undated) DEVICE — SURGICEL 4 X 8

## (undated) DEVICE — LABEL MEDICATION NEUO ORTHO

## (undated) DEVICE — BETHLEHEM UNIVERSAL OUTPATIENT: Brand: CARDINAL HEALTH

## (undated) DEVICE — COVER CAMERA LIGHT HANDLE

## (undated) DEVICE — SPONGE LAP 18X18 SAFE-T RFID ENHANCED XRAY

## (undated) DEVICE — TIBURON SPLIT SHEET: Brand: CONVERTORS

## (undated) DEVICE — 3M™ STERI-STRIP™ COMPOUND BENZOIN TINCTURE 40 BAGS/CARTON 4 CARTONS/CASE C1544: Brand: 3M™ STERI-STRIP™

## (undated) DEVICE — SYRINGE 20ML LL

## (undated) DEVICE — BLADE TYMPANOPLASTY 2.5MM 60DEG ANGL BVL DOWN SPOON TIP SHARP

## (undated) DEVICE — SOLN DURAPREP WAND

## (undated) DEVICE — SOLN IRRIG .9%SOD 1000ML

## (undated) DEVICE — PREP SURGICAL PURPREP 26ML

## (undated) DEVICE — TRAY FOLEY 16FR URIMETER SURESTEP

## (undated) DEVICE — SOLN IV 0.9% NSS 1000ML

## (undated) DEVICE — 3M™ STERI-STRIP™ REINFORCED ADHESIVE SKIN CLOSURES, R1547, 1/2 IN X 4 IN (12 MM X 100 MM), 6 STRIPS/ENVELOPE: Brand: 3M™ STERI-STRIP™

## (undated) DEVICE — INTENDED FOR TISSUE SEPARATION, AND OTHER PROCEDURES THAT REQUIRE A SHARP SURGICAL BLADE TO PUNCTURE OR CUT.: Brand: BARD-PARKER SAFETY BLADES SIZE 15, STERILE

## (undated) DEVICE — PENEVAC1 NONSTICK SMOKE EVAC

## (undated) DEVICE — Device: Brand: MEDEX

## (undated) DEVICE — SUCTION COAGULATOR: Brand: VALLEYLAB

## (undated) DEVICE — INSULATED BLADE ELECTRODE;CAUTION: FOR MANUFACTURING, PROCESSING, OR REPACKING.: Brand: EDGE

## (undated) DEVICE — SKIN MARKER DUAL TIP WITH RULER CAP, FLEXIBLE RULER AND LABELS: Brand: DEVON

## (undated) DEVICE — SOLUTION PROV-IODINE .75 OZ

## (undated) DEVICE — INTENDED FOR TISSUE SEPARATION, AND OTHER PROCEDURES THAT REQUIRE A SHARP SURGICAL BLADE TO PUNCTURE OR CUT.: Brand: BARD-PARKER ® CARBON RIB-BACK BLADES

## (undated) DEVICE — SUTURE QUILL PDO 0 RX-2068Q 1/2 CIRCLE 36MM REVERSE CUTTER 4

## (undated) DEVICE — PACK TOTAL HIP

## (undated) DEVICE — ***USE 20472***HOOD T4

## (undated) DEVICE — CONTAINER SPECIMEN 4OZ

## (undated) DEVICE — PACK CRANIOTOMY PBDS RF

## (undated) DEVICE — GAUZE SPONGES,USP TYPE VII GAUZE, 12 PLY: Brand: CURITY

## (undated) DEVICE — DRAPE CAMERA/LASER

## (undated) DEVICE — NEURO PATTIES 1/4 X 1/4

## (undated) DEVICE — BLADE SAGITTAL EXTRA-WIDE THIN SHORT

## (undated) DEVICE — POV-IOD SWAB STICKS

## (undated) DEVICE — POV-IOD SOLUTION 4OZ BT

## (undated) DEVICE — NEEDLE 27 G X 1 1/4

## (undated) DEVICE — MICRODISSECTION NEEDLE STRAIGHT SLEEVE: Brand: COLORADO

## (undated) DEVICE — SUT MONOCRYL 5-0 P-3 18 IN Y493G

## (undated) DEVICE — SPECIMEN CONTAINER STERILE PEEL PACK

## (undated) DEVICE — DURASEAL EXTENDED APPLICATOR TIP 15CM

## (undated) DEVICE — SUT VICRYL PLUS 3-0 RB-1 CR/8 18 IN VCP713D

## (undated) DEVICE — GAUZE SPONGES,8 PLY: Brand: CURITY

## (undated) DEVICE — PENCIL ELECTROSURG E-Z CLEAN -0035H

## (undated) DEVICE — MANIFOLD FOUR PORT NEPTUNE

## (undated) DEVICE — DURASEAL 5ML

## (undated) DEVICE — LIGHT HANDLE COVER SLEEVE DISP BLUE STELLAR

## (undated) DEVICE — ADHESIVE SKIN DERMABOND ADVANCED 0.7ML

## (undated) DEVICE — 3M™ IOBAN™ 2 ANTIMICROBIAL INCISE DRAPE 6640EZ: Brand: IOBAN™ 2

## (undated) DEVICE — GLOVE INDICATOR PI UNDERGLOVE SZ 8.5 BLUE

## (undated) DEVICE — 3M™ TEGADERM™ TRANSPARENT FILM DRESSING FRAME STYLE, 1624W, 2-3/8 IN X 2-3/4 IN (6 CM X 7 CM), 100/CT 4CT/CASE: Brand: 3M™ TEGADERM™

## (undated) DEVICE — SUTURE VICRYL 1 J196H CP UNDYED 27IN

## (undated) DEVICE — BLADE 1884004HR TRICUT 5PK M4 4MM ROTATE: Brand: TRICUT

## (undated) DEVICE — THE FLOSEAL MALLEABLE TIP AND TRIMMABLE TIP ARE INTENDED FOR DELIVERY OF FLOSEAL HEMOSTATIC MATRIX.: Brand: FLOSEAL SPECIAL APPLICATOR TIPS

## (undated) DEVICE — PAD GROUNDING ADULT

## (undated) DEVICE — SUTURE QUILL PDO 2 RX-1066Q

## (undated) DEVICE — TOOL T12MH25 LEGEND 12CM 2.5MM MH: Brand: MIDAS REX ™

## (undated) DEVICE — HANDPIECE SUCTION INTERPULSE W/BONE CLEANING TIP

## (undated) DEVICE — GLOVE SRG BIOGEL 8

## (undated) DEVICE — DRESSING AQUACEL AG 12 INCH

## (undated) DEVICE — DRESSING AQUACEL ADVA ANTIMCROBIAL 3.5 X 10IN

## (undated) DEVICE — FLOSEAL MATRIX IS INDICATED IN SURGICAL PROCEDURES (OTHER THAN IN OPHTHALMIC) AS AN ADJUNCT TO HEMOSTASIS WHEN CONTROL OF BLEEDING BY LIGATURE OR CONVENTIONAL PROCEDURES IS INEFFECTIVE OR IMPRACTICAL.: Brand: FLOSEAL HEMOSTATIC MATRIX

## (undated) DEVICE — GLOVE SRG BIOGEL ECLIPSE 7

## (undated) DEVICE — SOLN BETADINE 4 OZ

## (undated) DEVICE — ANTI-FOG SOLUTION WITH FOAM PAD: Brand: DEVON

## (undated) DEVICE — NEEDLE SPINAL 22G X 3.5IN  QUINCKE

## (undated) DEVICE — SUT ETHILON 5-0 P-3 18 IN 698H

## (undated) DEVICE — DISPOSABLE EQUIPMENT COVER: Brand: SMALL TOWEL DRAPE

## (undated) DEVICE — DRAPE ANTERIOR HIP R

## (undated) DEVICE — SYRINGE 3ML LL

## (undated) DEVICE — UNDYED MONOFILAMENT (POLYDIOXANONE), ABSORBABLE SURGICAL SUTURE: Brand: PDS

## (undated) DEVICE — MARKER REFLECTIVE RADIOPAQUE SPHERE

## (undated) DEVICE — CLOTH PREPPING SAGE 2% CHG 2/PK

## (undated) DEVICE — GLOVE SRG BIOGEL 7

## (undated) DEVICE — GLOVE PROTEXIS PI ORTHO 9.0

## (undated) DEVICE — DRAPE HALF STERILE

## (undated) DEVICE — PLASTIC ADHESIVE BANDAGE: Brand: CURITY

## (undated) DEVICE — REM POLYHESIVE ADULT PATIENT RETURN ELECTRODE: Brand: VALLEYLAB